# Patient Record
Sex: MALE | Race: WHITE | NOT HISPANIC OR LATINO | Employment: FULL TIME
[De-identification: names, ages, dates, MRNs, and addresses within clinical notes are randomized per-mention and may not be internally consistent; named-entity substitution may affect disease eponyms.]

---

## 2019-12-15 ENCOUNTER — HEALTH MAINTENANCE LETTER (OUTPATIENT)
Age: 29
End: 2019-12-15

## 2021-01-15 ENCOUNTER — HEALTH MAINTENANCE LETTER (OUTPATIENT)
Age: 31
End: 2021-01-15

## 2021-10-24 ENCOUNTER — HEALTH MAINTENANCE LETTER (OUTPATIENT)
Age: 31
End: 2021-10-24

## 2022-02-13 ENCOUNTER — HEALTH MAINTENANCE LETTER (OUTPATIENT)
Age: 32
End: 2022-02-13

## 2022-10-16 ENCOUNTER — HEALTH MAINTENANCE LETTER (OUTPATIENT)
Age: 32
End: 2022-10-16

## 2022-11-21 ENCOUNTER — OFFICE VISIT (OUTPATIENT)
Dept: URGENT CARE | Facility: URGENT CARE | Age: 32
End: 2022-11-21
Payer: COMMERCIAL

## 2022-11-21 VITALS
TEMPERATURE: 98.2 F | DIASTOLIC BLOOD PRESSURE: 80 MMHG | WEIGHT: 180 LBS | HEIGHT: 72 IN | RESPIRATION RATE: 15 BRPM | SYSTOLIC BLOOD PRESSURE: 118 MMHG | OXYGEN SATURATION: 98 % | BODY MASS INDEX: 24.38 KG/M2 | HEART RATE: 75 BPM

## 2022-11-21 DIAGNOSIS — J01.10 ACUTE NON-RECURRENT FRONTAL SINUSITIS: Primary | ICD-10-CM

## 2022-11-21 PROCEDURE — 99203 OFFICE O/P NEW LOW 30 MIN: CPT | Performed by: PHYSICIAN ASSISTANT

## 2022-11-21 NOTE — PROGRESS NOTES
Assessment & Plan     Acute non-recurrent frontal sinusitis  Use abx as prescribed, follow-up with primary if no improvement of symptoms. Patient understands and is amenable to plan.      CHRISTIN Deleon [facility] [place]    Sumit Turner is a 32 year old presenting for the following health issues:  Urgent Care and Cough (Dealing with cough x 2 months. Getting worse. Wife had Covid back then but he didn't test positive. Also tested last Friday. )  Cough x 2 months, has been intermittent, but the last week has gotten worse. He has a 2 year old, so they are frequently sick. He did take a COVID test 4 days ago was negative. Worse with speaking. Has had sinus pressure across forehead. Runny nose and nasal congestion. No ear pain. No SOB, no wheezing, no fevers.    HPI     Review of Systems   Constitutional, HEENT, cardiovascular, pulmonary, gi and gu systems are negative, except as otherwise noted.      Objective    /80   Pulse 75   Temp 98.2  F (36.8  C) (Temporal)   Resp 15   Ht 1.829 m (6')   Wt 81.6 kg (180 lb)   SpO2 98%   BMI 24.41 kg/m    Body mass index is 24.41 kg/m .  Physical Exam   GENERAL: healthy, alert and no distress  HENT: normal cephalic/atraumatic, ear canals and TM's normal, nose and mouth without ulcers or lesions, oropharynx clear, oral mucous membranes moist and sinuses: frontal tenderness on bilaterally  NECK: no adenopathy, no asymmetry, masses, or scars and thyroid normal to palpation  RESP: lungs clear to auscultation - no rales, rhonchi or wheezes  CV: regular rate and rhythm, normal S1 S2, no S3 or S4, no murmur, click or rub, no peripheral edema and peripheral pulses strong  MS: no gross musculoskeletal defects noted, no edema  PSYCH: mentation appears normal, affect normal/bright    No results found for this or any previous visit (from the past 24 hour(s)).

## 2023-01-24 ENCOUNTER — NURSE TRIAGE (OUTPATIENT)
Dept: NURSING | Facility: CLINIC | Age: 33
End: 2023-01-24
Payer: COMMERCIAL

## 2023-01-24 NOTE — TELEPHONE ENCOUNTER
"Nurse Triage SBAR    Situation:   -Cough, neck pain    Background:   -Patient calling, It is okay to leave a detailed message at this number.   -ongoing sinus infection (for the past few weeks - was seen in )  -the antibiotics helped but the cough came back     Assessment:     Neck:  -now has stiff neck (for the past 3 days)  -neck pain is now 6/10, mostly on right at base of tanja  -he is able to touch chin to chest  -no trauma or know injury  -He dose jujutsu and lists dumbbells - may be a lifting injury?     Cough  -Temp is 97.1  -Persistent cough since summer  -Productive  -Got worse after recent URI  -No nasal congestion  -No chest pain  -Daughter has RSV now    Recommendation:   Neck: see PCP within 3 days  Cough: see PCP  Within 2 weeks  -warm transferred to [facility] , if nothing is available go to /[facility]    SOUTH NEW RN on 1/24/2023 at 5:58 PM       Reason for Disposition    [1] MODERATE neck pain (e.g., interferes with normal activities) AND [2] present > 3 days    Cough lasts > 3 weeks    [1] Dry lingering cough AND [2] recent cold symptoms (e.g., runny nose, fever)    Additional Information    Negative: Shock suspected (e.g., cold/pale/clammy skin, too weak to stand, low BP, rapid pulse)    Negative: Sounds like a life-threatening emergency to the triager    Negative: Difficult to awaken or acting confused (e.g., disoriented, slurred speech)    Negative: [1] Similar pain previously AND [2] it was from \"heart attack\"    Negative: [1] Similar pain previously AND [2] it was from \"angina\" AND [3] not relieved by nitroglycerin    Negative: Followed a neck injury (e.g., MVA, sports, impact or collision)    Negative: Chest pain    Negative: Lymph node in the neck is swollen or painful to the touch    Negative: Sore throat is main symptom    Negative: [1] Stiff neck (can't put chin to chest) AND [2] headache    Negative: Difficulty breathing or unusual sweating (e.g., sweating without exertion)    " "Negative: [1] Stiff neck (can't put chin to chest) AND [2] fever    Negative: Weakness of an arm or hand    Negative: Problems with bowel or bladder control    Negative: Head is twisting to one side (or ask \"is it turning against your will?\")    Negative: Patient sounds very sick or weak to the triager    Negative: [1] SEVERE neck pain (e.g., excruciating, unable to do any normal activities) AND [2] not improved after 2 hours of pain medicine    Negative: [1] Fever > 100.0 F (37.8 C) AND [2] Intravenous Drug Use (IVDU)    Negative: [1] Fever > 100.0 F (37.8 C) AND [2] diabetes mellitus or weak immune system (e.g., HIV positive, cancer chemo, splenectomy, organ transplant, chronic steroids)    Negative: Numbness in an arm or hand (i.e., loss of sensation)    Negative: Tenderness or swelling of front of neck over windpipe    Negative: Rash in same area as pain (may be described as \"small blisters\")    Negative: High-risk adult (e.g., history of cancer, HIV, or IV drug use)    Negative: Neck pain present > 2 weeks    Negative: Pain shoots (radiates) into arm or hand    Negative: Neck pain is a chronic symptom (recurrent or ongoing AND present > 4 weeks)    Negative: [1] Age > 50 AND [2] no history of prior similar neck pain    Negative: SEVERE difficulty breathing (e.g., struggling for each breath, speaks in single words)    Negative: Sounds like a life-threatening emergency to the triager    Negative: [1] Difficulty breathing AND [2] not from stuffy nose (e.g., not relieved by cleaning out the nose)    Negative: [1] SEVERE headache AND [2] fever    Negative: [1] Taking antibiotic > 24 hours AND [2] fever > 103 F (39.4 C)    Negative: [1] Redness or swelling on the cheek, forehead or around the eye AND [2] fever    Negative: Patient sounds very sick or weak to the triager    Negative: [1] Redness or swelling on the cheek, forehead or around the eye AND [2] new since starting antibiotics    Negative: [1] SEVERE sinus " pain AND [2] not improved 2 hours after pain medicine    Negative: [1] Taking antibiotic > 48 hours (2 days) AND [2] fever persists    Negative: [1] Taking antibiotic > 72 hours (3 days) AND [2] sinus pain not improved    Negative: [1] Taking antibiotic > 7 days AND [2] nasal discharge not improved    Negative: SEVERE difficulty breathing (e.g., struggling for each breath, speaks in single words)    Negative: Sounds like a life-threatening emergency to the triager    Negative: SEVERE difficulty breathing (e.g., struggling for each breath, speaks in single words)    Negative: Sounds like a life-threatening emergency to the triager    Negative: Chest pain is main symptom    Negative: [1] Dry cough (non-productive;  no sputum or minimal clear sputum) AND [2] < 3 weeks duration    Negative: [1] Wet cough (productive; white-yellow, yellow, green, or abhishek colored sputum) AND [2] < 3 weeks duration    Negative: [1] Previous asthma attacks AND [2] this feels like asthma attack    Negative: [1] Lips or face are bluish now AND [2] persists when not coughing    Negative: [1] MODERATE difficulty breathing (e.g., speaks in phrases, SOB even at rest, pulse 100-120) AND [2] still present when not coughing    Negative: Chest pain  (Exception: MILD central chest pain, present only when coughing)    Negative: [1] MILD difficulty breathing (e.g., minimal/no SOB at rest, SOB with walking, pulse <100) AND [2] still present when not coughing (Exception: no change from usual, chronic shortness of breath)    Negative: [1] Increasing difficulty breathing AND [2] always has some difficulty breathing    Negative: Patient sounds very sick or weak to the triager    Negative: [1] Fever > 100.0 F (37.8 C) AND [2] diabetes mellitus or weak immune system (e.g., HIV positive, cancer chemo, splenectomy, organ transplant, chronic steroids)    Negative: [1] Fever > 100.0 F (37.8 C) AND [2] bedridden (e.g., nursing home patient, CVA, chronic illness,  recovering from surgery)    Negative: [1] Fever > 101 F (38.3 C) AND [2] age > 60 years    Negative: Fever > 103 F (39.4 C)    Negative: [1] Coughed up blood AND [2] > 1 tablespoon (15 ml)  (Exception: Blood-tinged sputum.)    Negative: SEVERE coughing spells (e.g., whooping sound after coughing, vomiting after coughing)    Negative: Fever present > 3 days (72 hours)    Negative: Sinus pain or pressure (around cheekbone or eye)    Negative: [1] Blood-tinged sputum has been coughed up AND [2] more than once    Negative: [1] Continuous (nonstop) coughing interferes with work or school AND [2] no improvement using cough treatment per Care Advice    Negative: Coughing up abhishek-colored sputum    Negative: Change in color of sputum (e.g., from white to yellow-green sputum)    Negative: Increase in amount of sputum    Negative: Taking an ACE Inhibitor medication  (e.g., benazepril/LOTENSIN, captopril/CAPOTEN, enalapril/VASOTEC, lisinopril/ZESTRIL)    Negative: [1] Chest or rib pain AND [2] only occurs while coughing    Negative: [1] Nasal discharge AND [2] present > 10 days    Negative: History of asthma or has mild wheezing    Negative: Exposure to TB (Tuberculosis)    Negative: [1] History of gastric reflux AND [2] intermittent symptoms of sour taste in mouth AND [3] dry cough    Protocols used: NECK PAIN OR [id number], COUGH - ACUTE NON-PRODUCTIVE-A-AH, COUGH - CHRONIC-A-AH, SINUS INFECTION ON ANTIBIOTIC FOLLOW-UP CALL-A-

## 2023-01-25 ENCOUNTER — OFFICE VISIT (OUTPATIENT)
Dept: FAMILY MEDICINE | Facility: CLINIC | Age: 33
End: 2023-01-25
Payer: COMMERCIAL

## 2023-01-25 VITALS
SYSTOLIC BLOOD PRESSURE: 144 MMHG | BODY MASS INDEX: 24.72 KG/M2 | WEIGHT: 182.5 LBS | HEART RATE: 62 BPM | HEIGHT: 72 IN | DIASTOLIC BLOOD PRESSURE: 85 MMHG | OXYGEN SATURATION: 99 % | TEMPERATURE: 98 F | RESPIRATION RATE: 16 BRPM

## 2023-01-25 DIAGNOSIS — M54.2 NECK PAIN: Primary | ICD-10-CM

## 2023-01-25 DIAGNOSIS — M62.838 NECK MUSCLE SPASM: ICD-10-CM

## 2023-01-25 LAB
AST SERPL W P-5'-P-CCNC: 46 U/L (ref 10–50)
CREAT SERPL-MCNC: 1 MG/DL (ref 0.67–1.17)
CRP SERPL-MCNC: <3 MG/L
ERYTHROCYTE [SEDIMENTATION RATE] IN BLOOD BY WESTERGREN METHOD: 8 MM/HR (ref 0–15)
GFR SERPL CREATININE-BSD FRML MDRD: >90 ML/MIN/1.73M2

## 2023-01-25 PROCEDURE — 85652 RBC SED RATE AUTOMATED: CPT | Performed by: INTERNAL MEDICINE

## 2023-01-25 PROCEDURE — 36415 COLL VENOUS BLD VENIPUNCTURE: CPT | Performed by: INTERNAL MEDICINE

## 2023-01-25 PROCEDURE — 84450 TRANSFERASE (AST) (SGOT): CPT | Performed by: INTERNAL MEDICINE

## 2023-01-25 PROCEDURE — 86140 C-REACTIVE PROTEIN: CPT | Performed by: INTERNAL MEDICINE

## 2023-01-25 PROCEDURE — 82565 ASSAY OF CREATININE: CPT | Performed by: INTERNAL MEDICINE

## 2023-01-25 PROCEDURE — 99214 OFFICE O/P EST MOD 30 MIN: CPT | Performed by: INTERNAL MEDICINE

## 2023-01-25 RX ORDER — MELOXICAM 15 MG/1
15 TABLET ORAL DAILY PRN
Qty: 14 TABLET | Refills: 0 | Status: SHIPPED | OUTPATIENT
Start: 2023-01-25 | End: 2024-04-15

## 2023-01-25 RX ORDER — METHOCARBAMOL 500 MG/1
500 TABLET, FILM COATED ORAL 4 TIMES DAILY PRN
Qty: 30 TABLET | Refills: 0 | Status: SHIPPED | OUTPATIENT
Start: 2023-01-25 | End: 2024-04-15

## 2023-01-25 ASSESSMENT — PAIN SCALES - GENERAL: PAINLEVEL: MODERATE PAIN (4)

## 2023-01-25 NOTE — PROGRESS NOTES
Assessment & Plan   Problem List Items Addressed This Visit    None  Visit Diagnoses     Neck pain    -  Primary    Relevant Medications    meloxicam (MOBIC) 15 MG tablet    methocarbamol (ROBAXIN) 500 MG tablet    Other Relevant Orders    Creatinine    CRP, inflammation    ESR: Erythrocyte sedimentation rate    AST    Physical Therapy Referral    CBC with platelets    Magnesium    Neck muscle spasm        Relevant Medications    meloxicam (MOBIC) 15 MG tablet    methocarbamol (ROBAXIN) 500 MG tablet         Anti-inflammatories and a muscle relaxer advised on use ,do not use  muscle relaxant when driving or using alcohol or fine machinery.  Keep well-hydrated rested ,can apply some warm compresses, refer to physical therapy if symptoms progress or worsen to notify us in next 72 hours.  Reassured this is not meningitis at this time is a focal area in the neck right posterior muscle spasm.  Denies any associated sinus symptoms headaches fevers or other systemic complaints, no neurologic deficits which is reassuring.  Will check basic labs including inflammatory markers and kidney liver test.  We will hold on imaging of his neck unless symptoms progress worsen as mentioned no red flags from physical exam or history.           Return in about 3 days (around 1/28/2023), or if symptoms worsen or fail to improve, for As needed and if symptoms worsen.    Quentin Jhaveri MD  [facility] Turner is a 32 year ol, presenting for the following health issues:  Chronic Cough (Since last November - tested negative for COVID (this past weekend) ) and Neck Pain (Back of neck )      History of Present Illness       Headaches:   Since the patient's last clinic visit, headaches are: worsened  The patient is getting headaches:  Constant over last few days  He is not able to do normal daily activities when he has a migraine.  The patient is taking the following rescue/relief medications:  Ibuprofen  "(Advil, Motrin) and Tylenol   Patient states \"I get only a small amount of relief\" from the rescue/relief medications.   The patient is taking the following medications to prevent migraines:  No medications to prevent migraines  In the past 4 weeks, the patient has gone to an Urgent Care or Emergency Room 0 times times due to headaches.    He eats 2-3 servings of fruits and vegetables daily.He consumes 0 sweetened beverage(s) daily.He exercises with enough effort to increase his heart rate 30 to 60 minutes per day.  He exercises with enough effort to increase his heart rate 4 days per week.   He is taking medications regularly.     Patient presents for evaluation of right posterior neck pain that radiates up to the right occipital area.  He he had sinus infection for last couple weeks was treated for that.  Denies any frontal headaches    Denies any shooting pain down to the right arm ,denies any focal weakness.  No vision changes, no slurred speech or facial weakness.  No injury in particular but he Last time was training with [employer] last Wednesday.  Denies any neck injury per se.  No other systemic complaints.      Review of Systems   Constitutional, HEENT, cardiovascular, pulmonary, gi and gu systems are negative, except as otherwise noted.      Objective    BP (!) 144/85 (BP Location: Right arm, Patient Position: Sitting, Cuff Size: Adult Regular)   Pulse 62   Temp 98  F (36.7  C) (Temporal)   Resp 16   Ht 1.829 m (6')   Wt 82.8 kg (182 lb 8 oz)   SpO2 99%   BMI 24.75 kg/m    Body mass index is 24.75 kg/m .  Physical Exam   General appearance not in acute distress, alert oriented x3  Head and neck intact cranial nerves II to XII, no neck masses, no tenderness over cervical vertebral, has some tenderness over the right lower paracervical vertebral muscles; slightly spastic  Full range of motion of the necK   oral cavity, minimal pharyngeal redness slight tonsillar swelling.  No exudates  No cervical " lymphadenopathy  Intact neurologic exam  Eyes EOMI PERRLA,      Office Visit on 08/28/2014   Component Date Value Ref Range Status     TSH 08/28/2014 1.83  0.40 - 4.00 mU/L Final    Comment: Effective 7/30/2014, the reference range for this assay has changed to reflect   new instrumentation/methodology.       T4 Free 08/28/2014 1.20  0.76 - 1.46 ng/dL Final    Comment: Effective 7/30/2014, the reference range for this assay has changed to reflect   new instrumentation/methodology.

## 2023-03-16 ENCOUNTER — E-VISIT (OUTPATIENT)
Dept: URGENT CARE | Facility: CLINIC | Age: 33
End: 2023-03-16
Payer: COMMERCIAL

## 2023-03-16 DIAGNOSIS — J01.90 ACUTE SINUSITIS, RECURRENCE NOT SPECIFIED, UNSPECIFIED LOCATION: Primary | ICD-10-CM

## 2023-03-16 PROCEDURE — 99421 OL DIG E/M SVC 5-10 MIN: CPT | Performed by: PREVENTIVE MEDICINE

## 2023-03-16 RX ORDER — DOXYCYCLINE 100 MG/1
100 TABLET ORAL 2 TIMES DAILY
Qty: 14 TABLET | Refills: 0 | Status: SHIPPED | OUTPATIENT
Start: 2023-03-16 | End: 2023-03-23

## 2023-03-16 NOTE — PATIENT INSTRUCTIONS
Dear Franco Raymond    I have ordered a different antibiotic for you.  I also advise using over the counter fluticasone 2 sprays per nostril daily.  In addition, netipot saline rinse is recommended two times per day.  If these measures don't improve your symptoms in 2-3 weeks, I advise follow up in the clinic in person for assessment as this has been going on for a while.    Thanks for choosing us as your health care partner,    Joseph Ruiz MD

## 2023-03-26 ENCOUNTER — HEALTH MAINTENANCE LETTER (OUTPATIENT)
Age: 33
End: 2023-03-26

## 2023-07-12 ENCOUNTER — E-VISIT (OUTPATIENT)
Dept: URGENT CARE | Facility: CLINIC | Age: 33
End: 2023-07-12
Payer: COMMERCIAL

## 2023-07-12 ENCOUNTER — TELEPHONE (OUTPATIENT)
Dept: NURSING | Facility: CLINIC | Age: 33
End: 2023-07-12

## 2023-07-12 DIAGNOSIS — R05.2 SUBACUTE COUGH: Primary | ICD-10-CM

## 2023-07-12 PROCEDURE — 99207 PR NO CHARGE LOS: CPT | Performed by: PHYSICIAN ASSISTANT

## 2023-07-12 NOTE — TELEPHONE ENCOUNTER
"Telephone Call:     Pt was advised via an evisit to be seen in person by a provider for his \"nagging cough\" he has had for a while. The provider stated that patient should be seen in person per his report.     Pt is asking what his options are for appts to be seen in person. Pt was given the appointment information. There were no appts in clinic near his location, so he was advised that he could be [place] in person [place] [facility]     Claudine Freeman RN  [facility] Nurse Advisor 2:04 PM 7/12/2023    "

## 2023-07-12 NOTE — PATIENT INSTRUCTIONS
Dear Franco Raymond,    We are sorry you are not feeling well. Based on the responses you provided, it is recommended that you be seen in-person in urgent care so we can better evaluate your symptoms. Please click here to find the nearest urgent care location to you.   You will not be charged for this Visit. Thank you for trusting us with your care.    Juan A Zepeda PA-C

## 2023-12-23 ENCOUNTER — E-VISIT (OUTPATIENT)
Dept: URGENT CARE | Facility: CLINIC | Age: 33
End: 2023-12-23
Payer: COMMERCIAL

## 2023-12-23 DIAGNOSIS — H57.89 REDNESS OF EYE, LEFT: Primary | ICD-10-CM

## 2023-12-23 PROCEDURE — 99207 PR NON-BILLABLE SERV PER CHARTING: CPT | Performed by: FAMILY MEDICINE

## 2023-12-23 NOTE — PATIENT INSTRUCTIONS
Dear Franco Raymond,    We are sorry you are not feeling well. Based on the responses you provided, it is recommended that you be seen in-person in urgent care so we can better evaluate your symptoms. Please click here to find the nearest urgent care location to you.   You will not be charged for this Visit. Thank you for trusting us with your care.    Ronnie Moore MD    The photo is of a child. If you want evisit for the child than it has to be under their chart/name.  Dr. Moore

## 2024-01-26 ENCOUNTER — OFFICE VISIT (OUTPATIENT)
Dept: URGENT CARE | Facility: URGENT CARE | Age: 34
End: 2024-01-26
Payer: COMMERCIAL

## 2024-01-26 ENCOUNTER — ANCILLARY PROCEDURE (OUTPATIENT)
Dept: GENERAL RADIOLOGY | Facility: CLINIC | Age: 34
End: 2024-01-26
Attending: PHYSICIAN ASSISTANT
Payer: COMMERCIAL

## 2024-01-26 VITALS
WEIGHT: 187.5 LBS | HEART RATE: 70 BPM | HEIGHT: 72 IN | DIASTOLIC BLOOD PRESSURE: 84 MMHG | SYSTOLIC BLOOD PRESSURE: 124 MMHG | OXYGEN SATURATION: 98 % | BODY MASS INDEX: 25.4 KG/M2 | TEMPERATURE: 98.1 F

## 2024-01-26 DIAGNOSIS — M54.41 ACUTE RIGHT-SIDED LOW BACK PAIN WITH RIGHT-SIDED SCIATICA: Primary | ICD-10-CM

## 2024-01-26 PROCEDURE — 99213 OFFICE O/P EST LOW 20 MIN: CPT | Mod: 25 | Performed by: PHYSICIAN ASSISTANT

## 2024-01-26 PROCEDURE — 96372 THER/PROPH/DIAG INJ SC/IM: CPT | Performed by: PHYSICIAN ASSISTANT

## 2024-01-26 PROCEDURE — 72100 X-RAY EXAM L-S SPINE 2/3 VWS: CPT | Mod: TC | Performed by: INTERNAL MEDICINE

## 2024-01-26 RX ORDER — KETOROLAC TROMETHAMINE 30 MG/ML
30 INJECTION, SOLUTION INTRAMUSCULAR; INTRAVENOUS ONCE
Status: COMPLETED | OUTPATIENT
Start: 2024-01-26 | End: 2024-01-26

## 2024-01-26 RX ORDER — NAPROXEN SODIUM 550 MG/1
550 TABLET ORAL 2 TIMES DAILY WITH MEALS
Qty: 14 TABLET | Refills: 0 | Status: SHIPPED | OUTPATIENT
Start: 2024-01-27 | End: 2024-02-03

## 2024-01-26 RX ORDER — CYCLOBENZAPRINE HCL 10 MG
10 TABLET ORAL 3 TIMES DAILY PRN
Qty: 21 TABLET | Refills: 0 | Status: SHIPPED | OUTPATIENT
Start: 2024-01-26 | End: 2024-02-02

## 2024-01-26 RX ADMIN — KETOROLAC TROMETHAMINE 30 MG: 30 INJECTION, SOLUTION INTRAMUSCULAR; INTRAVENOUS at 14:24

## 2024-01-26 ASSESSMENT — ENCOUNTER SYMPTOMS
PARESTHESIAS: 1
WEAKNESS: 0
NUMBNESS: 1
BACK PAIN: 1

## 2024-01-26 ASSESSMENT — PAIN SCALES - GENERAL: PAINLEVEL: SEVERE PAIN (7)

## 2024-01-26 NOTE — PATIENT INSTRUCTIONS
Perform hot epsom salt soaks, light stretching, lidocaine patches as needed.   Follow-up with Spine for further evaluation

## 2024-01-26 NOTE — PROGRESS NOTES
Assessment & Plan        1. Acute right-sided low back pain with right-sided sciatica    - ketorolac (TORADOL) injection 30 mg was administered in the clinic.  Patient tolerated the medication well.  Patient reported an improvement in the back pain after 20 minutes.  - XR Lumbar Spine 2/3 Views does not show DDD, lumbar stenosis per radiology report  -I have referred patient to Spine for further evaluation and treatment.  - Spine  Referral; Future  - cyclobenzaprine (FLEXERIL) 10 MG tablet; Take 1 tablet (10 mg) by mouth 3 times daily as needed for muscle spasms  Dispense: 21 tablet; Refill: 0  - naproxen sodium (ANAPROX) 550 MG tablet; Take 1 tablet (550 mg) by mouth 2 times daily (with meals) for 7 days  Dispense: 14 tablet; Refill: 0    Results for orders placed or performed in visit on 01/26/24   XR Lumbar Spine 2/3 Views     Status: None    Narrative    XR LUMBAR SPINE 2/3 VIEWS  1/26/2024 2:59 PM     HISTORY: Acute right-sided low back pain with right-sided sciatica    COMPARISON: None.       Impression    IMPRESSION: Alignment of the lumbar spine is within normal limits. No  loss of vertebral body height. No significant degenerative endplate  changes or loss of intervertebral disc space. Minimal lower lumbar  facet arthropathy. No soft tissue abnormality.    JEREMY HULL MD         SYSTEM ID:  [id number]       Patient Instructions   Perform hot epsom salt soaks, light stretching, lidocaine patches as needed.   Follow-up with Spine for further evaluation      Return for Follow up with Spine.    At the end of the encounter, I discussed results, diagnosis, medications. Discussed red flags for immediate return to clinic/ER, as well as indications for follow up if no improvement. Patient understood and agreed to plan. Patient was stable for discharge.    Sumit Turner is a 33 year old male who presents to clinic today for the following health issues:  Chief Complaint   Patient presents with     Urgent Care    Back Pain     Back pain x7 weeks, this morning pain worsened, lower righ back pain that sometimes spreads down hip to side of right leg, hurts to sit, hard to get in and out of cars   Started after jumping on trampoline     HPI  Patient reports back pain which started 7 weeks ago. Patient was jumping on the trampoline for 20 minutes. He was at a trampoline park for his daughter`s cousin birthday. He reports right sided back pain which radiates to the right groin area, and anterior thigh. He notes sitting worsens the pain, walking is fine. He has pain when he has to get out of the car. He had experienced tingling and numbness on the lower back.  He rates the pain at 5/10 and worse at 8/10. He describes the pain as sharp. He has tried, stretching, foam rolling, tylenol, ibuprofen. He has been to the chiropractor twice.  Denies weakness.       Review of Systems   Musculoskeletal:  Positive for back pain. Negative for gait problem.   Neurological:  Positive for numbness and paresthesias. Negative for weakness.   All other systems reviewed and are negative.      Problem List:  Calculus of kidney  Thyrotoxicosis      Past Medical History:   Diagnosis Date    Calculus of kidney 10/06    Thyrotoxicosis without mention of goiter or other cause, without mention of thyrotoxic crisis or storm        Social History     Tobacco Use    Smoking status: Never     Passive exposure: Yes    Smokeless tobacco: Never    Tobacco comments:     Socially  1-2 per day   Substance Use Topics    Alcohol use: No           Objective    /84   Pulse 70   Temp 98.1  F (36.7  C) (Temporal)   Ht 1.829 m (6')   Wt 85.1 kg (187 lb 8 oz)   SpO2 98%   BMI 25.43 kg/m    Physical Exam  Vitals and nursing note reviewed.   Constitutional:       Appearance: Normal appearance.   Cardiovascular:      Rate and Rhythm: Normal rate and regular rhythm.   Pulmonary:      Effort: Pulmonary effort is normal.      Breath sounds: Normal breath  sounds.   Musculoskeletal:      Cervical back: Normal range of motion and neck supple.      Lumbar back: Positive right straight leg raise test and positive left straight leg raise test.   Skin:     General: Skin is warm and dry.      Findings: No rash.   Neurological:      General: No focal deficit present.      Mental Status: He is alert and oriented to person, place, and time.      Cranial Nerves: Cranial nerves 2-12 are intact.      Sensory: Sensation is intact.      Motor: Motor function is intact.      Gait: Gait is intact.      Deep Tendon Reflexes:      Reflex Scores:       Patellar reflexes are 2+ on the right side and 2+ on the left side.       Achilles reflexes are 2+ on the right side and 2+ on the left side.  Psychiatric:         Mood and Affect: Mood normal.         Behavior: Behavior normal.              Alanna Howard PA-C

## 2024-01-29 ENCOUNTER — OFFICE VISIT (OUTPATIENT)
Dept: PHYSICAL MEDICINE AND REHAB | Facility: CLINIC | Age: 34
End: 2024-01-29
Attending: PHYSICIAN ASSISTANT
Payer: COMMERCIAL

## 2024-01-29 DIAGNOSIS — M51.369 DDD (DEGENERATIVE DISC DISEASE), LUMBAR: ICD-10-CM

## 2024-01-29 DIAGNOSIS — M54.41 ACUTE RIGHT-SIDED LOW BACK PAIN WITH RIGHT-SIDED SCIATICA: Primary | ICD-10-CM

## 2024-01-29 DIAGNOSIS — M54.16 RIGHT LUMBAR RADICULOPATHY: ICD-10-CM

## 2024-01-29 DIAGNOSIS — M47.816 LUMBAR FACET ARTHROPATHY: ICD-10-CM

## 2024-01-29 PROCEDURE — 99204 OFFICE O/P NEW MOD 45 MIN: CPT | Performed by: NURSE PRACTITIONER

## 2024-01-29 RX ORDER — PREDNISONE 10 MG/1
TABLET ORAL
Qty: 18 TABLET | Refills: 0 | Status: SHIPPED | OUTPATIENT
Start: 2024-01-29 | End: 2024-05-08

## 2024-01-29 NOTE — LETTER
1/29/2024         RE: Franco Raymond  [address]  [facility] [address]        Dear Colleague,    Thank you for referring your patient, Franco Raymond, to the [facility]. Please see a copy of my visit note below.    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    No myelopathic or red flag symptoms.          No data to display                     Diagnoses and all orders for this visit:  Acute right-sided low back pain with right-sided sciatica  -     Spine  Referral  -     MR Lumbar Spine w/o Contrast; Future  -     predniSONE (DELTASONE) 10 MG tablet; Prednisone 10 mg tablet, 3 tablets p.o. daily for 3 days, then 2 tablets p.o. daily for 3 days, then 1 tablet p.o. daily for 3 days then stop.  -     Physical Therapy Referral; Future  Lumbar facet arthropathy  Right lumbar radiculopathy  -     MR Lumbar Spine w/o Contrast; Future  -     predniSONE (DELTASONE) 10 MG tablet; Prednisone 10 mg tablet, 3 tablets p.o. daily for 3 days, then 2 tablets p.o. daily for 3 days, then 1 tablet p.o. daily for 3 days then stop.  -     Physical Therapy Referral; Future  DDD (degenerative disc disease), lumbar  -     MR Lumbar Spine w/o Contrast; Future  -     Physical Therapy Referral; Future    PLAN:  Reviewed spine anatomy and disease process. Discussed diagnosis and treatment options with the patient today. A shared decision making model was used.  The patient's values and choices were respected. The following represents what was discussed and decided upon by the provider and the patient.       -DIAGNOSTIC TESTS:  Images were personally reviewed and interpreted and explained to patient today using spine model.   -- Ordered urgent lumbar spine MRI to further evaluate lumbar radiculopathy for possible intervention.  -- Lumbar spine x-ray 1/26/2024 with normal alignment and disc height.  Minimal facet arthropathy lower lumbar spine.    -PHYSICAL THERAPY: Referral to physical therapy to establish home exercises for lumbar core strengthening and nerve glides.  Discussed the importance of core strengthening, ROM, stretching exercises with the patient and how each of these entities is important in decreasing pain.  Explained to the patient that the purpose of physical therapy is to teach the patient a home exercise program.  These exercises need to be performed every day in order to decrease pain and prevent future occurrences of pain.        -MEDICATIONS: Prescribed Medrol Dosepak today.  Advised patient to stop naproxen, no meloxicam and avoid OTC NSAIDs will take this medication and take it with full glass water and food.  Can continue with muscle relaxant as needed as well.  Discussed multiple medication options today with patient. Discussed risks, side effects, and proper use of medications. Patient verbalized understanding.    -INTERVENTIONS: Discussed with patient if no benefit with Medrol Dosepak and pain continues to be severe could consider RIGHT lumbar HALIMA pending imaging review.  Patient would be open to this as well as currently his pain is intolerable.  Discussed risks and benefits of injections with patient today.    -PATIENT EDUCATION:  Total time of 54 minutes, on the day of service, spent with the patient, reviewing the chart, placing orders, and documenting.     -FOLLOW-UP:   Follow-up [employer] message for MRI results and possible injection options.    Advised patient to call the Spine Center if symptoms worsen or you have problems controlling bladder and bowel function.    ______________________________________________________________________    SUBJECTIVE:  HPI:  Farnco Raymond  Is a 33 year old male who presents today for new patient evaluation of low back pain that has been intermittent however typically flareups only last for couple of days historically.  Patient has had now severe right low back and right radiating lateral hip and anterior lateral thigh pain for the last 8 weeks after jumping on his trampoline.  Pain initially was severe and has been since then currently at a 9/10, a 1 at its best but overall it is aggravated most of the time.  He does report that driving, transition from sit to  the first couple of steps is really aggravating and he is noticing some tingling in his leg as well.  Currently denies left leg symptoms.  Denies any lower extremity weakness.  Denies any recent trips or falls or balance changes.  Denies bowel or bladder loss control.        -Treatment to Date: No prior spinal surgery or spinal injections.  No prior physical therapy  Chiropractic care in the last 8 weeks x 2 with short-term relief    -Medications:  Cyclobenzaprine  Naproxen 550 mg  Toradol injection PCP 1/26/2024    Past medications:  Meloxicam  Methocarbamol    Current Outpatient Medications   Medication     cyclobenzaprine (FLEXERIL) 10 MG tablet     naproxen sodium (ANAPROX) 550 MG tablet     predniSONE (DELTASONE) 10 MG tablet     Acetaminophen-guaiFENesin 325-200 MG TABS     meloxicam (MOBIC) 15 MG tablet     methocarbamol (ROBAXIN) 500 MG tablet     NO ACTIVE MEDICATIONS     No current facility-administered medications for this visit.       Allergies   Allergen Reactions     Compazine [Prochlorperazine]      Dystonic jaw movements         Past Medical History:   Diagnosis Date     Calculus of kidney 10/06     Thyrotoxicosis without mention of goiter or other cause, without mention of thyrotoxic crisis or storm         Patient Active Problem List   Diagnosis     Calculus  of kidney     Thyrotoxicosis       Past Surgical History:   Procedure Laterality Date     SURGICAL HISTORY OF -   10/06    cystoscopy/ureteral stone extraction - dr herndon       Family History   Problem Relation Age of Onset     Family History Negative Unknown        Reviewed past medical, surgical, and family history with patient found on new patient intake packet located in EMR Media tab.     SOCIAL HX: Patient is  and works as a .  Typically is active 3 to 5 days a week with [employer].  Patient denies smoking/tobacco use.  Does report occasional beer/cocktail use.  Denies history being a heavy drinker.  Does report occasional THC Gummies.    ROS: Patient reports positive joint pain, muscle pain, muscle fatigue, anxiety/depression, diarrhea.  Specifically negative for bowel/bladder dysfunction, balance changes, headache, dizziness, foot drop, fevers, chills, appetite changes, nausea/vomiting, unexplained weight loss. Otherwise 13 systems reviewed are negative. Please see the patient's intake questionnaire from today for details.    OBJECTIVE:  There were no vitals taken for this visit.    PHYSICAL EXAMINATION:    --CONSTITUTIONAL:  Vital signs as above.  No acute distress.  The patient is well nourished and well groomed.  --PSYCHIATRIC:  Appropriate mood and affect. The patient is awake, alert, oriented to person, place, time and answering questions appropriately with clear speech.    --SKIN:  Skin over the face, bilateral lower extremities, and posterior torso is clean, dry, intact without rashes.    --RESPIRATORY: Normal rhythm and effort. No abnormal accessory muscle breathing patterns noted.   --STANDING EXAMINATION:  Normal lumbar lordosis noted, no lateral shift.  --MUSCULOSKELETAL: Range of motion is limited in all range of motion, greatest in forward flexion due to pain.  No tenderness to palpation lumbar spine.  Straight leg raising is positive to back pain bilaterally, positive to leg pain  on the right.  --GROSS MOTOR: Gait is non-antalgic.   --LOWER EXTREMITY MOTOR TESTING:  Plantar flexion left 5/5, right 5/5   Dorsiflexion left 5/5, right 5/5   Great toe MTP extension left 5/5, right 5/5  Knee flexion left 5/5, right 5/5  Knee extension left 5/5, right 5/5   Hip flexion left 5/5, right 5/5  Hip abduction left 5/5, right 5/5  Hip adduction left 5/5, right 5/5   --HIPS: Full range of motion bilaterally. Negative FABERs on the involved lower extremity.   --NEUROLOGICAL:  2/4 patellar, achilles reflexes on the left and 1/4 on the right.  Sensation to light touch is intact in the bilateral L4, L5, and S1 dermatomes. Babinski is negative.   --VASCULAR:  Bilateral lower extremities are warm.  There is no pitting edema of the bilateral lower extremities.    RESULTS: Prior medical records from [facility] and [facility] Everywhere were reviewed today.    Imaging: Spine imaging was personally reviewed and interpreted today. The images were shown to the patient and the findings were explained using a spine model.      XR Lumbar Spine 2/3 Views    Result Date: 1/26/2024  XR LUMBAR SPINE 2/3 VIEWS  1/26/2024 2:59 PM HISTORY: Acute right-sided low back pain with right-sided sciatica COMPARISON: None.     IMPRESSION: Alignment of the lumbar spine is within normal limits. No loss of vertebral body height. No significant degenerative endplate changes or loss of intervertebral disc space. Minimal lower lumbar facet arthropathy. No soft tissue abnormality. JEREMY HULL MD   SYSTEM ID:  [id number]               Again, thank you for allowing me to participate in the care of your patient.        Sincerely,        Viktoriya Cohen, TIERRA

## 2024-01-29 NOTE — PROGRESS NOTES
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    No myelopathic or red flag symptoms.          No data to display                     Diagnoses and all orders for this visit:  Acute right-sided low back pain with right-sided sciatica  -     Spine  Referral  -     MR Lumbar Spine w/o Contrast; Future  -     predniSONE (DELTASONE) 10 MG tablet; Prednisone 10 mg tablet, 3 tablets p.o. daily for 3 days, then 2 tablets p.o. daily for 3 days, then 1 tablet p.o. daily for 3 days then stop.  -     Physical Therapy Referral; Future  Lumbar facet arthropathy  Right lumbar radiculopathy  -     MR Lumbar Spine w/o Contrast; Future  -     predniSONE (DELTASONE) 10 MG tablet; Prednisone 10 mg tablet, 3 tablets p.o. daily for 3 days, then 2 tablets p.o. daily for 3 days, then 1 tablet p.o. daily for 3 days then stop.  -     Physical Therapy Referral; Future  DDD (degenerative disc disease), lumbar  -     MR Lumbar Spine w/o Contrast; Future  -     Physical Therapy Referral; Future    PLAN:  Reviewed spine anatomy and disease process. Discussed diagnosis and treatment options with the patient today. A shared decision making model was used.  The patient's values and choices were respected. The following represents what was discussed and decided upon by the provider and the patient.      -DIAGNOSTIC TESTS:  Images were personally reviewed and interpreted and explained to patient today using spine model.   -- Ordered urgent lumbar spine MRI to further evaluate lumbar radiculopathy for possible intervention.  -- Lumbar spine x-ray  1/26/2024 with normal alignment and disc height.  Minimal facet arthropathy lower lumbar spine.    -PHYSICAL THERAPY: Referral to physical therapy to establish home exercises for lumbar core strengthening and nerve glides.  Discussed the importance of core strengthening, ROM, stretching exercises with the patient and how each of these entities is important in decreasing pain.  Explained to the patient that the purpose of physical therapy is to teach the patient a home exercise program.  These exercises need to be performed every day in order to decrease pain and prevent future occurrences of pain.        -MEDICATIONS: Prescribed Medrol Dosepak today.  Advised patient to stop naproxen, no meloxicam and avoid OTC NSAIDs will take this medication and take it with full glass water and food.  Can continue with muscle relaxant as needed as well.  Discussed multiple medication options today with patient. Discussed risks, side effects, and proper use of medications. Patient verbalized understanding.    -INTERVENTIONS: Discussed with patient if no benefit with Medrol Dosepak and pain continues to be severe could consider RIGHT lumbar HALIMA pending imaging review.  Patient would be open to this as well as currently his pain is intolerable.  Discussed risks and benefits of injections with patient today.    -PATIENT EDUCATION:  Total time of 54 minutes, on the day of service, spent with the patient, reviewing the chart, placing orders, and documenting.     -FOLLOW-UP:   Follow-up [employer] message for MRI results and possible injection options.    Advised patient to call the Spine Center if symptoms worsen or you have problems controlling bladder and bowel function.   ______________________________________________________________________    SUBJECTIVE:  HPI:  Franco ERNST Robinsonartem  Is a 33 year old male who presents today for new patient evaluation of low back pain that has been intermittent however typically flareups only last for couple  of days historically.  Patient has had now severe right low back and right radiating lateral hip and anterior lateral thigh pain for the last 8 weeks after jumping on his trampoline.  Pain initially was severe and has been since then currently at a 9/10, a 1 at its best but overall it is aggravated most of the time.  He does report that driving, transition from sit to  the first couple of steps is really aggravating and he is noticing some tingling in his leg as well.  Currently denies left leg symptoms.  Denies any lower extremity weakness.  Denies any recent trips or falls or balance changes.  Denies bowel or bladder loss control.        -Treatment to Date: No prior spinal surgery or spinal injections.  No prior physical therapy  Chiropractic care in the last 8 weeks x 2 with short-term relief    -Medications:  Cyclobenzaprine  Naproxen 550 mg  Toradol injection PCP 1/26/2024    Past medications:  Meloxicam  Methocarbamol    Current Outpatient Medications   Medication    cyclobenzaprine (FLEXERIL) 10 MG tablet    naproxen sodium (ANAPROX) 550 MG tablet    predniSONE (DELTASONE) 10 MG tablet    Acetaminophen-guaiFENesin 325-200 MG TABS    meloxicam (MOBIC) 15 MG tablet    methocarbamol (ROBAXIN) 500 MG tablet    NO ACTIVE MEDICATIONS     No current facility-administered medications for this visit.       Allergies   Allergen Reactions    Compazine [Prochlorperazine]      Dystonic jaw movements         Past Medical History:   Diagnosis Date    Calculus of kidney 10/06    Thyrotoxicosis without mention of goiter or other cause, without mention of thyrotoxic crisis or storm         Patient Active Problem List   Diagnosis    Calculus of kidney    Thyrotoxicosis       Past Surgical History:   Procedure Laterality Date    SURGICAL HISTORY OF -   10/06    cystoscopy/ureteral stone extraction - dr herndon       Family History   Problem Relation Age of Onset    Family History Negative Unknown        Reviewed past  medical, surgical, and family history with patient found on new patient intake packet located in EMR Media tab.     SOCIAL HX: Patient is  and works as a .  Typically is active 3 to 5 days a week with [employer].  Patient denies smoking/tobacco use.  Does report occasional beer/cocktail use.  Denies history being a heavy drinker.  Does report occasional THC Gummies.    ROS: Patient reports positive joint pain, muscle pain, muscle fatigue, anxiety/depression, diarrhea.  Specifically negative for bowel/bladder dysfunction, balance changes, headache, dizziness, foot drop, fevers, chills, appetite changes, nausea/vomiting, unexplained weight loss. Otherwise 13 systems reviewed are negative. Please see the patient's intake questionnaire from today for details.    OBJECTIVE:  There were no vitals taken for this visit.    PHYSICAL EXAMINATION:    --CONSTITUTIONAL:  Vital signs as above.  No acute distress.  The patient is well nourished and well groomed.  --PSYCHIATRIC:  Appropriate mood and affect. The patient is awake, alert, oriented to person, place, time and answering questions appropriately with clear speech.    --SKIN:  Skin over the face, bilateral lower extremities, and posterior torso is clean, dry, intact without rashes.    --RESPIRATORY: Normal rhythm and effort. No abnormal accessory muscle breathing patterns noted.   --STANDING EXAMINATION:  Normal lumbar lordosis noted, no lateral shift.  --MUSCULOSKELETAL: Range of motion is limited in all range of motion, greatest in forward flexion due to pain.  No tenderness to palpation lumbar spine.  Straight leg raising is positive to back pain bilaterally, positive to leg pain on the right.  --GROSS MOTOR: Gait is non-antalgic.   --LOWER EXTREMITY MOTOR TESTING:  Plantar flexion left 5/5, right 5/5   Dorsiflexion left 5/5, right 5/5   Great toe MTP extension left 5/5, right 5/5  Knee flexion left 5/5, right 5/5  Knee extension left 5/5, right 5/5   Hip  flexion left 5/5, right 5/5  Hip abduction left 5/5, right 5/5  Hip adduction left 5/5, right 5/5   --HIPS: Full range of motion bilaterally. Negative FABERs on the involved lower extremity.   --NEUROLOGICAL:  2/4 patellar, achilles reflexes on the left and 1/4 on the right.  Sensation to light touch is intact in the bilateral L4, L5, and S1 dermatomes. Babinski is negative.   --VASCULAR:  Bilateral lower extremities are warm.  There is no pitting edema of the bilateral lower extremities.    RESULTS: Prior medical records from [facility] and [facility] Everywhere were reviewed today.    Imaging: Spine imaging was personally reviewed and interpreted today. The images were shown to the patient and the findings were explained using a spine model.      XR Lumbar Spine 2/3 Views    Result Date: 1/26/2024  XR LUMBAR SPINE 2/3 VIEWS  1/26/2024 2:59 PM HISTORY: Acute right-sided low back pain with right-sided sciatica COMPARISON: None.     IMPRESSION: Alignment of the lumbar spine is within normal limits. No loss of vertebral body height. No significant degenerative endplate changes or loss of intervertebral disc space. Minimal lower lumbar facet arthropathy. No soft tissue abnormality. JEREMY HULL MD   SYSTEM ID:  [id number]

## 2024-01-29 NOTE — PATIENT INSTRUCTIONS
~Spine Center Scheduling #[phone].  ~Please call our [facility] Spine Nurse Navigation #[phone] with any questions or concerns about your treatment plan, if symptoms worsen and you would like to be seen urgently, or if you have problems controlling bladder and bowel function.  ~For any future flareups or new symptoms, recommend follow-up in clinic or contact the nurse navigator line.  ~Please note that any My Chart messages may take multiple days for a response due to the high volume of patients seen in clinic.  Anything sent Thursday night or after will be answered the following week when able, as Viktoriya Cohen CNP does not work in clinic on Fridays.   ~Viktoriya Cohen CNP is at the [facility] on the first and third Tuesdays of the month only, otherwise primarily at the [facility].        Imaging has been ordered. Radiology will call you to schedule. Please call below if you do not hear from them in the next couple of days.     [facility] Radiology Scheduling    Please call [phone] to schedule your image(s) (select option #1). There are 3 different locations, see below.     [facility]  [address] [facility] [place]  [address], [address]   [place] [address]       A Medrol Dose Pack (Prednisone Taper) was prescribed today for your acute pain. Please follow the dosing instruction on prescription bottle.    *Stop taking over-the-counter NSAIDs and no naproxen or meloxicam while taking this medication.  Please take with full glass water and food as well.    POSSIBLE STEROID SIDE EFFECTS   -If you experience these, it should only last for a short period-   Swelling   Increased appetite   Skin redness (flushness)   Skin rash   Mouth (oral) irritation   Blood sugar (glucose) levels   Sweats   Mood changes       ~You have been  referred for Physical Therapy to [facility]. They will call you to schedule an appointment.      Scheduling phone number is [phone] for [facility], or [place] location.  If you have not heard from the scheduling office within 2 business days, please call [phone] for ALL other locations.    Discussed the importance of core strengthening, ROM, stretching exercises and how each of these entities is important in decreasing pain and improving long term spine health.  The purpose of physical therapy is to teach you an individualized home exercise program.  These exercises need to be performed every day in order to decrease pain and prevent future occurrences of pain.

## 2024-02-02 NOTE — PROGRESS NOTES
PHYSICAL THERAPY EVALUATION  Type of Visit: Evaluation    See electronic medical record for Abuse and Falls Screening details.    Subjective   Date of onset: 12/11/23      Franco Raymond is a 33 year old male with a lumbar condition. Mechanism of injury: Jumping on trampoline. Where: (home, work, MVA, community, recreation/sport, unknown, other): community. Location of symptoms: right low back, right lateral hip, right anterior lateral thigh. Pain level on number scale: 1-9/10. Quality of pain: spasm. Associated symptoms: denies numbness, tingling. Pain frequency (constant/intermittent): constant. Symptoms are exacerbated by: driving, sit>stand, sitting, walking, standing, walking. Symptoms are relieved by: muscle relaxers. Progression of symptoms since onset (same/better/worse): better. Special tests (x-ray, MRI, CT scan, EMG, bone scan): x-ray, MRI. Previous treatment:  Prescribed Medrol Dosepak. Improvement with previous treatment: yes. General health as reported by patient is good. Pertinent medical history includes:  see Epic. Medical allergies includes: see Epic. Surgical history includes: see Epic. Current medications include: see Epic. Occupation: UX design. Patient is (working in normal job without restrictions/working in normal job with restrictions/working in an alternate job/not working due to present treatment problem): working in normal job. Primary job tasks: computer work. Barriers at home/work: None reported by patient. Red flags: None reported by patient.     Objective   Posture    Sitting (good/fair/poor): poor  Standing (good/fair/poor):  fair  Lordosis (red/acc/normal):  normal  Lateral shift (right/left/nil):  nil  Relevant lateral shift (yes/no):  no  Correction of posture (better/worse/no effect): better    Neurological    Myotomes L R   L1-2 (hip flexion) 5/5 5/5   L3 (knee extension) 5/5 5/5   L4 (ankle DF) 5/5 5/5   L5 (g. toe ext) 5/5 5/5   S1 (ankle PF or knee flex) 5/5 5/5     Sensory  Deficit, Reflexes: lumbar dermatomes WNL    Lumbar Movement Loss Jv Mod Min Nil Pain   Flexion   x  +right low back   Extension   x  +right low back   Side Gliding L   x  +right low back   Side Gliding R   x  +right low back     Assessment & Plan   CLINICAL IMPRESSIONS  Medical Diagnosis: Acute right-sided low back pain with right-sided sciatica    Treatment Diagnosis: Acute right-sided low back pain with right-sided sciatica    Impression/Assessment: Patient is a 33 year old male with lumbar complaints.  The following significant findings have been identified: Pain, Decreased ROM/flexibility, Decreased joint mobility, Decreased strength, Impaired muscle performance, Decreased activity tolerance, and Impaired posture. These impairments interfere with their ability to perform self care tasks, work tasks, recreational activities, household chores, driving , household mobility, and community mobility as compared to previous level of function.     Clinical Decision Making (Complexity):  Clinical Presentation: Stable/Uncomplicated  Clinical Presentation Rationale: based on medical and personal factors listed in PT evaluation  Clinical Decision Making (Complexity): Low complexity    PLAN OF CARE  Treatment Interventions:  Interventions: Manual Therapy, Neuromuscular Re-education, Therapeutic Activity, Therapeutic Exercise, Self-Care/Home Management    Long Term Goals     PT Goal 1  Goal Description: Patient will be able to drive with 0/10 pain.  Rationale: to maximize safety and independence with transportation  Target Date: 04/29/24      Frequency of Treatment: 1x/week  Duration of Treatment: for 4 weeks tapering down to 2x/month for 8 weeks    Recommended Referrals to Other Professionals:  None  Education Assessment:   Learner/Method: Patient;No Barriers to Learning    Risks and benefits of evaluation/treatment have been explained.   Patient/Family/caregiver agrees with Plan of Care.     Evaluation Time:     PT Bobby  Low Complexity Minutes (88492): 15    Signing Clinician: Rocky Barrientos PT

## 2024-02-05 ENCOUNTER — ANCILLARY PROCEDURE (OUTPATIENT)
Dept: MRI IMAGING | Facility: CLINIC | Age: 34
End: 2024-02-05
Attending: NURSE PRACTITIONER
Payer: COMMERCIAL

## 2024-02-05 ENCOUNTER — THERAPY VISIT (OUTPATIENT)
Dept: PHYSICAL THERAPY | Facility: CLINIC | Age: 34
End: 2024-02-05
Attending: NURSE PRACTITIONER
Payer: COMMERCIAL

## 2024-02-05 DIAGNOSIS — M54.16 RIGHT LUMBAR RADICULOPATHY: ICD-10-CM

## 2024-02-05 DIAGNOSIS — M54.41 ACUTE RIGHT-SIDED LOW BACK PAIN WITH RIGHT-SIDED SCIATICA: ICD-10-CM

## 2024-02-05 DIAGNOSIS — M51.369 DDD (DEGENERATIVE DISC DISEASE), LUMBAR: ICD-10-CM

## 2024-02-05 PROCEDURE — 72148 MRI LUMBAR SPINE W/O DYE: CPT | Mod: GC | Performed by: RADIOLOGY

## 2024-02-05 PROCEDURE — 97110 THERAPEUTIC EXERCISES: CPT | Mod: GP | Performed by: PHYSICAL THERAPIST

## 2024-02-05 PROCEDURE — 97161 PT EVAL LOW COMPLEX 20 MIN: CPT | Mod: GP | Performed by: PHYSICAL THERAPIST

## 2024-02-05 PROCEDURE — 97530 THERAPEUTIC ACTIVITIES: CPT | Mod: GP | Performed by: PHYSICAL THERAPIST

## 2024-02-13 ENCOUNTER — THERAPY VISIT (OUTPATIENT)
Dept: PHYSICAL THERAPY | Facility: CLINIC | Age: 34
End: 2024-02-13
Payer: COMMERCIAL

## 2024-02-13 DIAGNOSIS — M54.41 ACUTE RIGHT-SIDED LOW BACK PAIN WITH RIGHT-SIDED SCIATICA: Primary | ICD-10-CM

## 2024-02-13 PROCEDURE — 97530 THERAPEUTIC ACTIVITIES: CPT | Mod: GP | Performed by: PHYSICAL THERAPIST

## 2024-02-13 PROCEDURE — 97110 THERAPEUTIC EXERCISES: CPT | Mod: GP | Performed by: PHYSICAL THERAPIST

## 2024-02-20 ENCOUNTER — THERAPY VISIT (OUTPATIENT)
Dept: PHYSICAL THERAPY | Facility: CLINIC | Age: 34
End: 2024-02-20
Payer: COMMERCIAL

## 2024-02-20 DIAGNOSIS — M54.41 ACUTE RIGHT-SIDED LOW BACK PAIN WITH RIGHT-SIDED SCIATICA: Primary | ICD-10-CM

## 2024-02-20 PROCEDURE — 97110 THERAPEUTIC EXERCISES: CPT | Mod: GP | Performed by: PHYSICAL THERAPIST

## 2024-02-20 PROCEDURE — 97140 MANUAL THERAPY 1/> REGIONS: CPT | Mod: GP | Performed by: PHYSICAL THERAPIST

## 2024-02-27 ENCOUNTER — THERAPY VISIT (OUTPATIENT)
Dept: PHYSICAL THERAPY | Facility: CLINIC | Age: 34
End: 2024-02-27
Payer: COMMERCIAL

## 2024-02-27 DIAGNOSIS — M54.41 ACUTE RIGHT-SIDED LOW BACK PAIN WITH RIGHT-SIDED SCIATICA: Primary | ICD-10-CM

## 2024-02-27 PROCEDURE — 97110 THERAPEUTIC EXERCISES: CPT | Mod: GP | Performed by: PHYSICAL THERAPIST

## 2024-02-27 PROCEDURE — 97530 THERAPEUTIC ACTIVITIES: CPT | Mod: GP | Performed by: PHYSICAL THERAPIST

## 2024-04-05 ENCOUNTER — THERAPY VISIT (OUTPATIENT)
Dept: PHYSICAL THERAPY | Facility: CLINIC | Age: 34
End: 2024-04-05
Payer: COMMERCIAL

## 2024-04-05 DIAGNOSIS — M54.41 ACUTE RIGHT-SIDED LOW BACK PAIN WITH RIGHT-SIDED SCIATICA: Primary | ICD-10-CM

## 2024-04-05 PROCEDURE — 97140 MANUAL THERAPY 1/> REGIONS: CPT | Mod: GP

## 2024-04-05 PROCEDURE — 97110 THERAPEUTIC EXERCISES: CPT | Mod: GP

## 2024-04-05 PROCEDURE — 97530 THERAPEUTIC ACTIVITIES: CPT | Mod: GP

## 2024-04-09 ENCOUNTER — MYC MEDICAL ADVICE (OUTPATIENT)
Dept: PHYSICAL MEDICINE AND REHAB | Facility: CLINIC | Age: 34
End: 2024-04-09
Payer: COMMERCIAL

## 2024-04-09 DIAGNOSIS — M54.16 RIGHT LUMBAR RADICULOPATHY: Primary | ICD-10-CM

## 2024-04-10 ENCOUNTER — OFFICE VISIT (OUTPATIENT)
Dept: URGENT CARE | Facility: URGENT CARE | Age: 34
End: 2024-04-10
Payer: COMMERCIAL

## 2024-04-10 VITALS
HEIGHT: 72 IN | HEART RATE: 73 BPM | TEMPERATURE: 98.1 F | DIASTOLIC BLOOD PRESSURE: 75 MMHG | OXYGEN SATURATION: 97 % | WEIGHT: 182 LBS | BODY MASS INDEX: 24.65 KG/M2 | SYSTOLIC BLOOD PRESSURE: 124 MMHG | RESPIRATION RATE: 14 BRPM

## 2024-04-10 DIAGNOSIS — M54.41 ACUTE RIGHT-SIDED LOW BACK PAIN WITH RIGHT-SIDED SCIATICA: Primary | ICD-10-CM

## 2024-04-10 PROCEDURE — 99214 OFFICE O/P EST MOD 30 MIN: CPT | Performed by: INTERNAL MEDICINE

## 2024-04-10 RX ORDER — METHYLPREDNISOLONE 4 MG
TABLET, DOSE PACK ORAL
Qty: 21 TABLET | Refills: 0 | Status: SHIPPED | OUTPATIENT
Start: 2024-04-10 | End: 2024-05-08

## 2024-04-10 RX ORDER — CYCLOBENZAPRINE HCL 10 MG
5-10 TABLET ORAL 3 TIMES DAILY PRN
Qty: 15 TABLET | Refills: 0 | Status: SHIPPED | OUTPATIENT
Start: 2024-04-10 | End: 2024-05-08

## 2024-04-10 ASSESSMENT — PAIN SCALES - GENERAL: PAINLEVEL: MODERATE PAIN (4)

## 2024-04-10 NOTE — PATIENT INSTRUCTIONS
Physical Therapy later this week  Spine steroid injection per spine specialist 4/29.    Try medrol steroid pack.  Flexeril 5-10 mg up to 3 x day as needed.  No driving or operating heavy machinery on this.    Continue alternate ibuprofen & tylenol.    establish care

## 2024-04-10 NOTE — TELEPHONE ENCOUNTER
"Message from [facility] Viktoriya Cohen CNP: \"Please call patient to inquire about where his pain is.    Previously was right low back and right leg.  If this is still the case I would recommend trialing a right L4-5 and L5-S1 TFESI to see if we get further relief since he has not gotten relief with physical therapy.  His MRI previously was showing a disc herniation with foraminal stenosis on the right as well as right L5 nerve compression at the L4-5 level.\"     Phone call to patient to discuss symptoms. He reports that the pain is from right buttock down the entire back of his right leg to the ankle. Reports it had been in his buttock, around to hip and then don front of leg. It is down the back of leg and very painful. Reports he is on his way to urgent care for some pain medication at this time.    Offered the recommended injection. He would like to move forward with it. Order placed in Epic for the right L4-L5 and L5-S1 TFESI. Injection requirements reviewed in full with patient. Stated understanding. Transferred to scheduling to make appointment.    "

## 2024-04-10 NOTE — PROGRESS NOTES
ASSESSMENT AND PLAN:      ICD-10-CM    1. Acute right-sided low back pain with right-sided sciatica  M54.41 methylPREDNISolone (MEDROL DOSEPAK) 4 MG tablet therapy pack     cyclobenzaprine (FLEXERIL) 10 MG tablet          PLAN:      Patient Instructions         Physical Therapy later this week  Spine steroid injection per spine specialist 4/29.    Try medrol steroid pack.  Flexeril 5-10 mg up to 3 x day as needed.  No driving or operating heavy machinery on this.    Continue alternate ibuprofen & tylenol.    establish care           Trice Mosqueda MD  [facility]    Subjective     Franco Raymond is a 33 year old who presents for Patient presents with:  Back Pain  Urgent Care: Patient presents with lower right side back pain that he sustained back in November from an injury from a trampoline.Patient is incredible pain and is unable to sleep or sit.      an established patient of [facility].    Back Pain  Seen January with right-sided low back pain with sciatica -treatment Toradol shot, naproxen and Flexeril.  Physical therapy referral.  X-ray was normal   Also referral to spine specialist with appointment later in the month.  Treatment included physical therapy and MRI imaging  2/2024                                                                 IMPRESSION:  Multilevel lumbar spondylosis, most pronounced at L4-5 with moderate  stenosis and right central disc protrusion likely impinging upon the  traversing right L5 nerve root. No high-grade neural foraminal  stenosis.       Onset of recent back pain flare symptoms was 4 day(s) ago.  Location: right low back  Radiation: radiates into the right leg  Context: trigger - laid flat on floor, trying to get up from prone position was awful        Current and Associated symptoms: pain and shooting cramping pain from buttock to heal  Denies: fecal incontinence, urinary incontinence, and lower extremity weakness    Aggravating Factors: coughing and  changing position  Therapies to improve symptoms include: heat, ice, ibuprofen, Tylenol, stretching, and flexeril  Past history: recurrent self limited episodes of low back pain in the past since Nov 2023 - trampoline jumping      Review of Systems        Objective    /75   Pulse 73   Temp 98.1  F (36.7  C) (Temporal)   Resp 14   Ht 1.829 m (6')   Wt 82.6 kg (182 lb)   SpO2 97%   BMI 24.68 kg/m    Physical Exam  Vitals reviewed.   Constitutional:       Appearance: Normal appearance.   Musculoskeletal:      Comments: BACK: Lumbosacral spine area reveals local tenderness right side.  Painful and reduced LS ROM noted. Straight leg raise is positive at 45  degrees on bilateral but guards & tracks his leg with bending knee.  motor strength  with including heel and toe gait.     Neurological:      Mental Status: He is alert.

## 2024-04-12 ENCOUNTER — THERAPY VISIT (OUTPATIENT)
Dept: PHYSICAL THERAPY | Facility: CLINIC | Age: 34
End: 2024-04-12
Payer: COMMERCIAL

## 2024-04-12 DIAGNOSIS — M54.41 ACUTE RIGHT-SIDED LOW BACK PAIN WITH RIGHT-SIDED SCIATICA: Primary | ICD-10-CM

## 2024-04-12 PROCEDURE — 97530 THERAPEUTIC ACTIVITIES: CPT | Mod: GP

## 2024-04-12 PROCEDURE — 97110 THERAPEUTIC EXERCISES: CPT | Mod: GP

## 2024-04-12 PROCEDURE — 97140 MANUAL THERAPY 1/> REGIONS: CPT | Mod: GP

## 2024-04-13 ENCOUNTER — OFFICE VISIT (OUTPATIENT)
Dept: URGENT CARE | Facility: URGENT CARE | Age: 34
End: 2024-04-13
Payer: COMMERCIAL

## 2024-04-13 VITALS
DIASTOLIC BLOOD PRESSURE: 62 MMHG | OXYGEN SATURATION: 95 % | TEMPERATURE: 97.7 F | WEIGHT: 182 LBS | SYSTOLIC BLOOD PRESSURE: 136 MMHG | HEART RATE: 78 BPM | RESPIRATION RATE: 19 BRPM | BODY MASS INDEX: 24.68 KG/M2

## 2024-04-13 DIAGNOSIS — M54.41 ACUTE RIGHT-SIDED LOW BACK PAIN WITH RIGHT-SIDED SCIATICA: Primary | ICD-10-CM

## 2024-04-13 PROCEDURE — 99213 OFFICE O/P EST LOW 20 MIN: CPT | Performed by: PHYSICIAN ASSISTANT

## 2024-04-13 RX ORDER — TRAMADOL HYDROCHLORIDE 50 MG/1
50 TABLET ORAL 2 TIMES DAILY
Qty: 28 TABLET | Refills: 0 | Status: SHIPPED | OUTPATIENT
Start: 2024-04-13 | End: 2024-04-27

## 2024-04-13 ASSESSMENT — PAIN SCALES - GENERAL: PAINLEVEL: MODERATE PAIN (4)

## 2024-04-13 NOTE — PROGRESS NOTES
Acute right-sided low back pain with right-sided sciatica  - traMADol (ULTRAM) 50 MG tablet; Take 1 tablet (50 mg) by mouth 2 times daily for 14 days    General Tips for All Ages:    R.I.C.E Method:  Why: This helps reduce swelling and promotes healing.  What to Do:  Rest: Allow the injured area to rest.  Ice: Apply an ice pack for 15-20 minutes every 2-3 hours.  Compression: Use a compression bandage to control swelling.  Elevation: Elevate the injured limb to reduce swelling.    Over-the-Counter (OTC) Pain Relievers:  Why: Manages pain and reduces inflammation.  What to Use:  All Ages: Acetaminophen or ibuprofen as directed on the package.    Avoid H.A.R.M:  Why: To prevent further injury.  What to Avoid:  Heat: Avoid heat packs initially.  Alcohol: Can increase swelling.  Running: Until you receive clearance from a healthcare provider.  Massage: Avoid direct pressure on the injured area.    For Adults (18 Years and Older):    Physical Therapy (if prescribed):  Why: Helps restore strength and flexibility.  What to Do: Follow the prescribed exercises and attend therapy sessions.    Bracing/Taping (if prescribed):  Why: Provides support during recovery.  What to Do: Use braces or tape as directed by your healthcare provider.    For Adolescents (12-17 Years):    OTC Pain Relievers:  Why: Manages pain and reduces inflammation.  What to Use: Acetaminophen or ibuprofen as directed on the package.    Physical Therapy (if prescribed):  Why: Aids in regaining strength and flexibility.  What to Do: Follow the exercises given by your physical therapist.    For Children (Under 12 Years):    Pediatrician Consultation:  Why: Children's injuries may need specialized care.  What to Do: Consult your child's pediatrician for guidance.    OTC Pain Relievers (if approved by pediatrician):  Why: Manages pain and reduces inflammation.  What to Use: Follow your pediatrician's advice on using acetaminophen or ibuprofen.    All  Ages:    Hydration and Nutrition:  Why: Essential for healing.  What to Do: Stay hydrated and ensure a balanced diet rich in vitamins and minerals.    Restorative Sleep:  Why: Crucial for recovery.  What to Do: Ensure you get adequate and quality sleep.  Follow-Up:    Patient has an appointment with his spine specialist for a epidural injection in 2 weeks time.  Patient educated on red flag symptoms and asked to go directly to the ED if these symptoms present themselves.     Remember, individual cases may vary, and it's crucial to follow your healthcare provider's advice. If you have concerns or if symptoms persist, don't hesitate to reach out for further guidance.      Perry Dodson PA-C  [facility]    Subjective   33 year old who presents to clinic today for the following health issues:    Urgent Care       HPI   Where in your body do you have pain? Back Pain  Onset/Duration:     Buldging disc pain in back   Patient was given a medrol dose pack and cyclobenzaprine last visit 3 days ago and did not find this to be helpful. MRI in Feb showed right central disc protrusion likely impinging upon the traversing right L5 nerve root.  Description:   Location of pain: low back right  Character of pain: sharp  Pain radiation: Right leg to the ankle   New numbness or weakness in legs, not attributed to pain: no   Intensity: moderate to severe   Progression of Symptoms: worsening  History:   Specific cause: Patient had a injury on a trampoline several months ago- pain since then despite pain meds and PT   Pain interferes with job: YES  History of back problems: no prior back problems  Any previous MRI or X-rays: Yes- at [facility].    Sees a specialist for back pain: Patient is establish with a spine specialist.   Alleviating factors:   Improved by: Prednisone, methylprednisolone, acetaminophen (Tylenol), cold, heat, muscle relaxants, and NSAIDs      Patient has an appointment on 4/26/24 for a epidural  injection     Review of Systems   Review of Systems   See HPI    Objective    Temp: 97.7  F (36.5  C) Temp src: Temporal BP: 136/62 Pulse: 78   Resp: 19 SpO2: 95 %       Physical Exam   Physical Exam  Constitutional:       General: He is not in acute distress.     Appearance: Normal appearance. He is not ill-appearing, toxic-appearing or diaphoretic.      Comments: Patient is visibly in severe pain with any sort of lumbar spine movements.  He is slow to ambulate.  He seems to have full range of motion however bending forward makes his pain worse and walking makes his pain worse.   HENT:      Head: Normocephalic and atraumatic.   Cardiovascular:      Rate and Rhythm: Normal rate.      Pulses: Normal pulses.   Pulmonary:      Effort: Pulmonary effort is normal. No respiratory distress.   Neurological:      General: No focal deficit present.      Mental Status: He is alert and oriented to person, place, and time. Mental status is at baseline.      Gait: Gait normal.   Psychiatric:         Mood and Affect: Mood normal.         Behavior: Behavior normal.         Thought Content: Thought content normal.         Judgment: Judgment normal.          No results found for this or any previous visit (from the past 24 hour(s)).

## 2024-04-15 ENCOUNTER — RADIOLOGY INJECTION OFFICE VISIT (OUTPATIENT)
Dept: PHYSICAL MEDICINE AND REHAB | Facility: CLINIC | Age: 34
End: 2024-04-15
Attending: NURSE PRACTITIONER
Payer: COMMERCIAL

## 2024-04-15 ENCOUNTER — TELEPHONE (OUTPATIENT)
Dept: PHYSICAL MEDICINE AND REHAB | Facility: CLINIC | Age: 34
End: 2024-04-15

## 2024-04-15 VITALS
RESPIRATION RATE: 16 BRPM | OXYGEN SATURATION: 100 % | DIASTOLIC BLOOD PRESSURE: 78 MMHG | HEART RATE: 59 BPM | SYSTOLIC BLOOD PRESSURE: 120 MMHG

## 2024-04-15 DIAGNOSIS — M54.16 RIGHT LUMBAR RADICULOPATHY: ICD-10-CM

## 2024-04-15 PROCEDURE — 64483 NJX AA&/STRD TFRM EPI L/S 1: CPT | Mod: RT | Performed by: PAIN MEDICINE

## 2024-04-15 PROCEDURE — 64484 NJX AA&/STRD TFRM EPI L/S EA: CPT | Mod: RT | Performed by: PAIN MEDICINE

## 2024-04-15 RX ORDER — DEXAMETHASONE SODIUM PHOSPHATE 10 MG/ML
INJECTION, SOLUTION INTRAMUSCULAR; INTRAVENOUS
Status: COMPLETED | OUTPATIENT
Start: 2024-04-15 | End: 2024-04-15

## 2024-04-15 RX ORDER — LIDOCAINE HYDROCHLORIDE 10 MG/ML
INJECTION, SOLUTION EPIDURAL; INFILTRATION; INTRACAUDAL; PERINEURAL
Status: COMPLETED | OUTPATIENT
Start: 2024-04-15 | End: 2024-04-15

## 2024-04-15 RX ADMIN — LIDOCAINE HYDROCHLORIDE 4 ML: 10 INJECTION, SOLUTION EPIDURAL; INFILTRATION; INTRACAUDAL; PERINEURAL at 14:42

## 2024-04-15 RX ADMIN — DEXAMETHASONE SODIUM PHOSPHATE 20 MG: 10 INJECTION, SOLUTION INTRAMUSCULAR; INTRAVENOUS at 14:42

## 2024-04-15 ASSESSMENT — PAIN SCALES - GENERAL
PAINLEVEL: EXTREME PAIN (8)
PAINLEVEL: MODERATE PAIN (4)

## 2024-04-15 NOTE — TELEPHONE ENCOUNTER
[facility] Call Center    Phone Message    May a detailed message be left on voicemail: yes     Reason for Call: Other: patient calling as he wants to move the injection up. He also sent a mychart message.  As he is having increased pain and doesn't think he can wait much longer.      Action Taken: Other: Te     Travel Screening: Not Applicable

## 2024-04-15 NOTE — PATIENT INSTRUCTIONS
DISCHARGE INSTRUCTIONS    During office hours (8:00 a.m.- 4:00 p.m.) questions or concerns may be answered  by calling Spine Center Navigation Nurses at  [phone].  Messages received after hours will be returned the following business day.      In the case of an emergency, please dial 911 or seek assistance at the nearest Emergency Room/Urgent Care facility.     All Patients:    You may experience an increase in your symptoms for the first 2 days (It may take anywhere between 2 days- 2 weeks for the steroid to have maximum effect).    You may use ice on the injection site, as frequently as 20 minutes each hour if needed.    You may take your pain medicine.    You may continue taking your regular medication after your injection. If you have had a Medial Branch Block you may resume pain medication once your pain diary is completed.    You may shower. No swimming, tub bath or hot tub for 48 hours.  You may remove your bandaid/bandage as soon as you are home.    You may resume light activities, as tolerated.    Resume your usual diet as tolerated.    It is strongly advised that you do not drive for 1-3 hours post injection.    If you have had oral sedation:  Do not drive for 8 hours post injection.      If you have had IV sedation:  Do not drive for 24 hours post injection.  Do not operate hazardous machinery or make important personal/business decisions for 24 hours.      POSSIBLE STEROID SIDE EFFECTS (If steroid/cortisone was used for your procedure)    -If you experience these symptoms, it should only last for a short period    Swelling of the legs              Skin redness (flushing)     Mouth (oral) irritation   Blood sugar (glucose) levels            Sweats                    Mood changes  Headache  Sleeplessness  Weakened immune system for up to 14 days, which could increase the risk of bautista the COVID-19 virus and/or experiencing more severe symptoms of the disease, if exposed.  Decreased  effectiveness of the flu vaccine if given within 2 weeks of the steroid.         POSSIBLE PROCEDURE SIDE EFFECTS  -Call the Spine Center if you are concerned  Increased Pain           Increased numbness/tingling      Nausea/Vomiting          Bruising/bleeding at site      Redness or swelling                                              Difficulty walking      Weakness           Fever greater than 100.5    *In the event of a severe headache after an epidural steroid injection that is relieved by lying down, please call the [facility] to speak with a clinical staff member*

## 2024-04-19 ENCOUNTER — MYC MEDICAL ADVICE (OUTPATIENT)
Dept: PHYSICAL MEDICINE AND REHAB | Facility: CLINIC | Age: 34
End: 2024-04-19

## 2024-04-19 DIAGNOSIS — M54.16 RIGHT LUMBAR RADICULOPATHY: Primary | ICD-10-CM

## 2024-04-19 RX ORDER — GABAPENTIN 300 MG/1
CAPSULE ORAL
Qty: 90 CAPSULE | Refills: 0 | Status: SHIPPED | OUTPATIENT
Start: 2024-04-19 | End: 2024-05-22

## 2024-04-19 NOTE — TELEPHONE ENCOUNTER
Will send gabapentin 300mg to be gradually up-titrated to one tablet 3 x daily per medication instructions.   I would also recommend he start an anti-inflammatory such as ibuprofen 600mg 3x daily otc to see if this helps

## 2024-04-19 NOTE — TELEPHONE ENCOUNTER
Pt sent [employer] message this morning and called [facility] this afternoon. He had an urgent steroid injection on Monday, which has improved his symptoms slightly, but he's still unable to sleep. He states his Flexaril and tramadol are not helpful. He is wondering if he could try gabapentin at the suggestion of his PT.

## 2024-04-19 NOTE — TELEPHONE ENCOUNTER
Phone call to patient to relay new recommendations. Discussed the titration of the Gabapentin. Encouraged pt to alternate 2 Extra Strength Tylenol tablets with 400-600 mg Ibuprofen with food every 8 hours. Encouraged to be cautious with taking the Gabapentin, Flexeril and Tramadol at the same time; recommended he stagger them in times. Stated understanding.

## 2024-04-24 NOTE — PROGRESS NOTES
Assessment:     Diagnoses and all orders for this visit:  Right lumbar radiculopathy  -     Adult Neurosurgery  Referral; Future  Acute right-sided low back pain with right-sided sciatica  Protrusion of lumbar intervertebral disc  -     Adult Neurosurgery  Referral; Future  Lumbar foraminal stenosis  -     Adult Neurosurgery  Referral; Future     Franco Raymond is a 33 year old y.o. 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  No relief with physical therapy.     Plan:     A shared decision making plan was used. The patient's values and choices were respected. Prior medical records were reviewed today. The following represents what was discussed and decided upon by the provider and the patient.        -DIAGNOSTIC TESTS: Images were personally reviewed and interpreted.   -- Lumbar spine MRI 2/5/2024 with L4-5 moderate disc bulge on the right with disc protrusion with right L5 nerve compression in the lateral recess with mild bilateral foraminal stenosis and moderate central stenosis.  L5-S1 left disc protrusion with mild bilateral foraminal stenosis.  -- Lumbar spine x-ray 1/26/2024 with normal alignment and disc height.  Minimal facet arthropathy lower lumbar spine.     -INTERVENTIONS: No further injection recommendations at this time.      -Referral to [facility] neurosurgeon Dr. Sorensen for surgical evaluation at this time since he has failed conservative treatments with physical therapy and injections.    -MEDICATIONS: Tramadol as needed for severe breakthrough pain, he does not need a refill today he reports.  Can continue with gabapentin as well.  Discussed side effects of medications and proper use. Patient verbalized understanding.    -PHYSICAL THERAPY:  Encourage patient to continue with home exercises from prior physical therapy sessions.  Discussed the importance of core strengthening, ROM, stretching exercises with the patient and how each of these entities is important in decreasing pain.  Explained to the patient that the purpose of physical therapy is to teach the patient a home exercise program.  These exercises need to be performed every day in order to decrease pain and prevent future occurrences of pain.        -PATIENT EDUCATION:  Total time of 32 minutes, on the day of service, spent with the patient, reviewing the chart, placing orders, and documenting.     -FOLLOW UP: Follow-up for surgical opinion  Advised to contact clinic if symptoms worsen or change.    Subjective:     Franco Raymond is a 33 year old male who presents today for follow-up regarding ongoing right low back pain that is been initially ongoing for the last 6 months however progressive in the last couple of months that radiates into the right anterior thigh lateral thigh and posterior thigh as well as lateral shin with associated perceived lower extremity weakness.  Patient reports significant relief for the first couple of days with recent right L4-5 and L5-S1 TFESI but unfortunately no lasting benefit.  Currently pain is a 2/10 up to a 10 at its worst with certain types of movement specifically prolonged walking standing laying down in bed as well as coughing.  Does report sitting with support is his most comfortable position, medication helps and ice gives him some benefit.  He is frustrated at this is affecting his normal day-to-day activities and has been now for some time.    He otherwise historically is very active and does jujitsu, has not been able to do that recently and his goal is to be able to get to a place where he can do that again.    -Treatment to Date: No prior spinal surgery   No prior physical therapy  Chiropractic care in the last 8 weeks x 2 with short-term  relief    Right L4-5 and L5-S1 TFESI 4/15/2024 with 100% relief for the first couple of hours, 10-20% lasting benefit     -Medications:  Cyclobenzaprine  Naproxen 550 mg  Toradol injection PCP 1/26/2024     Past medications:  Meloxicam  Methocarbamol    Patient Active Problem List   Diagnosis    Calculus of kidney    Thyrotoxicosis    Acute right-sided low back pain with right-sided sciatica    Right lumbar radiculopathy       Current Outpatient Medications   Medication Sig Dispense Refill    gabapentin (NEURONTIN) 300 MG capsule Take 300mg po at bedtime for 3 days, then twice daily x 3 days, then three times daily. 90 capsule 0    methylPREDNISolone (MEDROL DOSEPAK) 4 MG tablet therapy pack Follow Package Directions 21 tablet 0    NO ACTIVE MEDICATIONS  0 0    predniSONE (DELTASONE) 10 MG tablet Prednisone 10 mg tablet, 3 tablets p.o. daily for 3 days, then 2 tablets p.o. daily for 3 days, then 1 tablet p.o. daily for 3 days then stop. 18 tablet 0    cyclobenzaprine (FLEXERIL) 10 MG tablet Take 0.5-1 tablets (5-10 mg) by mouth 3 times daily as needed for muscle spasms (Patient not taking: Reported on 4/29/2024) 15 tablet 0     No current facility-administered medications for this visit.       Allergies   Allergen Reactions    Compazine [Prochlorperazine]      Dystonic jaw movements         Past Medical History:   Diagnosis Date    Calculus of kidney 10/06    Thyrotoxicosis without mention of goiter or other cause, without mention of thyrotoxic crisis or storm         Review of Systems  ROS:  Specifically negative for bowel/bladder dysfunction, balance changes, headache, dizziness, foot drop, fevers, chills, appetite changes, nausea/vomiting, unexplained weight loss. Otherwise 13 systems reviewed are negative. Please see the patient's intake questionnaire from today for details.    Reviewed Social, Family, Past Medical and Past Surgical history with patient, no significant changes noted since prior visit.      Objective:     BP (!) 140/90 (BP Location: Left arm, Patient Position: Sitting, Cuff Size: Adult Large)   Pulse 70   Ht 6' (1.829 m)   Wt 185 lb 1.6 oz (84 kg)   SpO2 100%   BMI 25.10 kg/m      PHYSICAL EXAMINATION:    --CONSTITUTIONAL: Well developed, well nourished, healthy appearing individual.  --PSYCHIATRIC: Appropriate mood and affect. No difficulty interacting due to temper, social withdrawal, or memory issues.  --SKIN: Lumbar region is dry and intact.   --RESPIRATORY: Normal rhythm and effort. No abnormal accessory muscle breathing patterns noted.   --MUSCULOSKELETAL:  Normal lumbar lordosis noted, no lateral shift.  --GROSS MOTOR: Easily arises from a seated position. Gait is non-antalgic  --LUMBAR SPINE:  Inspection reveals no evidence of deformity.      RESULTS:   Imaging: Spine imaging was reviewed today. The images were shown to the patient and the findings were explained using a spine model.      Lumbar spine x-ray and MRI reviewed

## 2024-04-26 ENCOUNTER — THERAPY VISIT (OUTPATIENT)
Dept: PHYSICAL THERAPY | Facility: CLINIC | Age: 34
End: 2024-04-26
Payer: COMMERCIAL

## 2024-04-26 DIAGNOSIS — M54.16 RIGHT LUMBAR RADICULOPATHY: ICD-10-CM

## 2024-04-26 DIAGNOSIS — M54.41 ACUTE RIGHT-SIDED LOW BACK PAIN WITH RIGHT-SIDED SCIATICA: Primary | ICD-10-CM

## 2024-04-26 PROCEDURE — 97110 THERAPEUTIC EXERCISES: CPT | Mod: 59

## 2024-04-26 PROCEDURE — 97530 THERAPEUTIC ACTIVITIES: CPT | Mod: GP

## 2024-04-26 NOTE — Clinical Note
Viktoriya,  You are following up with Johnny on 4/29/24 for ongoing management of LBP with right sided radiculopathy.   He has been having worsening symptoms since returning to PT on 4/5/24 with no improvement with PT exercises, recent epidural CSI on 4/15/24, and consistent use of pain medications.  We have tracked worsening myotomal weakness at L4-L5 of RLE in today's session, no loss of sensation or bowel/bladder changes. He is very pleasant but in constant severe amounts of pain with significant limitation.  We told him we are recommending to hold off on PT for now and talk about next steps in you're upcoming appointment.  Please let me know if you have any questions. Thanks!  - Ander Walters PT, DPT

## 2024-04-26 NOTE — PROGRESS NOTES
PLAN  Johnny has been working with physical therapy since 12/11/23 for low back pain with left sided radicular symptoms. He has seen physical therapy for 7 visits total over this time period with initially noting a period of improvement, but recently came back to PT on 4/5/24 with worsening pain intensity and radicular symptoms. Over the course of the past few weeks he has been unable to obtain any improvement in symptoms with PT exercises, epidural CSI at L4-L5, and heavy consistency with pain medications. At today's visit, we have noticed a slight progression of worsening myotomes at L4-L5 on his right lower extremity. No changes in bowel/bladder function, sensation, or DTRs. Today we recommended patient follow up with PMR to talk about next steps in his low back pain management. PT is no longer indicated at this time.    Beginning/End Dates of Progress Note Reporting Period:  02/05/24 to 04/26/2024    Referring Provider:  Viktoriya Cohen       04/26/24 0500   Appointment Info   Signing clinician's name / credentials Ander Walters PT, DPT   Total/Authorized Visits 8 E+T   Visits Used 7   Medical Diagnosis Acute right-sided low back pain with right-sided sciatica   PT Tx Diagnosis Acute right-sided low back pain with right-sided sciatica   Other pertinent information brazilian jiu jitsu   Progress Note/Certification   Onset of illness/injury or Date of Surgery 12/11/23   Therapy Frequency 1x/week   Predicted Duration for 4 weeks tapering down to 2x/month for 8 weeks   Progress Note Completed Date 02/05/24   PT Goal 1   Goal Description Patient will be able to drive with 0/10 pain.   Rationale to maximize safety and independence with transportation   Goal Progress No change   Target Date 04/29/24   Subjective Report   Subjective Report Patient received epidural CSI on 4/15/24 and has switched over medication to gabapentin recently. Overall not noticing any big chances in his pain or symptoms. Is unable to  perform any HEP due to discomfort at this time. Has follow up appointment with PMR on 4/29/24 to talk about next steps.   Objective Measures   Objective Measures Objective Measure 1;Objective Measure 2;Objective Measure 3   Objective Measure 1   Objective Measure Lumbar AROM   Details Severe loss both directions - no directional prefernce   Objective Measure 2   Objective Measure Dermatomes   Details WNL   Objective Measure 3   Objective Measure Myotomes   Details Mild weakness on R L4 and L5 (worse since last session on 4/12/24)   Treatment Interventions (PT)   Interventions Therapeutic Procedure/Exercise;Therapeutic Activity;Manual Therapy   Therapeutic Procedure/Exercise   Therapeutic Procedures: strength, endurance, ROM, flexibility minutes (43841) 15   Ther Proc 1 Cat Cow - gentle   Ther Proc 1 - Details x20 reps today - very gentle   Ther Proc 2 Supine posterior pelvic tilt   Ther Proc 2 - Details x20 reps - very gentle   PTRx Ther Proc 1 Single knee to chest   PTRx Ther Proc 1 - Details NT   PTRx Ther Proc 2 Supine Lower Trunk Rotations   PTRx Ther Proc 2 - Details x20 reps - very gentle   Skilled Intervention Safe spinal mobility   Patient Response/Progress Patient tolerated well   Therapeutic Activity   Therapeutic Activities: dynamic activities to improve functional performance minutes (18244) 25   Ther Act 1 Activity Recommendations   Ther Act 1 - Details Discussed staying active, doing gentle exercises as tolerated frequently   Ther Act 2 Patient Education   Ther Act 2 - Details Lengthy discussion on conservative management effectiveness, recommended patient follow up with referring provider for next steps as symptoms have not changes and actually worsened over time, considering high levels of pain and limitation as well   PTRx Ther Act 1 Transfer Training   PTRx Ther Act 1 - Details VR - Discussed log roll technique for bed mobility   Skilled Intervention Improve patient understanding   Patient  Response/Progress Patient appreciated recommendations   Education   Learner/Method Patient;No Barriers to Learning   Plan   Plan for next session Refer to provider for failed conservative management   Total Session Time   Timed Code Treatment Minutes 40   Total Treatment Time (sum of timed and untimed services) 40

## 2024-04-29 ENCOUNTER — TELEPHONE (OUTPATIENT)
Dept: NEUROSURGERY | Facility: CLINIC | Age: 34
End: 2024-04-29

## 2024-04-29 ENCOUNTER — OFFICE VISIT (OUTPATIENT)
Dept: PHYSICAL MEDICINE AND REHAB | Facility: CLINIC | Age: 34
End: 2024-04-29
Payer: COMMERCIAL

## 2024-04-29 VITALS
BODY MASS INDEX: 25.07 KG/M2 | DIASTOLIC BLOOD PRESSURE: 90 MMHG | WEIGHT: 185.1 LBS | OXYGEN SATURATION: 100 % | HEART RATE: 70 BPM | HEIGHT: 72 IN | SYSTOLIC BLOOD PRESSURE: 140 MMHG

## 2024-04-29 DIAGNOSIS — M51.26 PROTRUSION OF LUMBAR INTERVERTEBRAL DISC: ICD-10-CM

## 2024-04-29 DIAGNOSIS — M54.16 RIGHT LUMBAR RADICULOPATHY: Primary | ICD-10-CM

## 2024-04-29 DIAGNOSIS — M54.41 ACUTE RIGHT-SIDED LOW BACK PAIN WITH RIGHT-SIDED SCIATICA: ICD-10-CM

## 2024-04-29 DIAGNOSIS — M48.061 LUMBAR FORAMINAL STENOSIS: ICD-10-CM

## 2024-04-29 PROCEDURE — 99214 OFFICE O/P EST MOD 30 MIN: CPT | Performed by: NURSE PRACTITIONER

## 2024-04-29 ASSESSMENT — PAIN SCALES - GENERAL: PAINLEVEL: MILD PAIN (3)

## 2024-04-29 NOTE — PATIENT INSTRUCTIONS
~Spine Center Scheduling #[phone].  ~Please call our [facility] Spine Nurse Navigation #[phone] with any questions or concerns about your treatment plan, if symptoms worsen and you would like to be seen urgently, or if you have problems controlling bladder and bowel function.  ~For any future flareups or new symptoms, recommend follow-up in clinic or contact the nurse navigator line.  ~Please note that any My Chart messages may take multiple days for a response due to the high volume of patients seen in clinic.  Anything sent Thursday night or after will be answered the following week when able, as Viktoriya Cohen CNP does not work in clinic on Fridays.   ~Viktoriya Cohen CNP is at the [facility] on the first and third Tuesdays of the month only, otherwise primarily at the [facility].        Importance of specialized Physical Therapy: Discussed the importance of core strengthening, ROM, stretching exercises with the patient and how each of these entities is important in decreasing pain.  Explained to the patient that the purpose of physical therapy is to teach the patient a home exercise program individualized to them and their specific health concerns.  These exercises need to be performed every day in order to decrease pain and prevent future occurrences of pain.           You have been referred to [facility] Neurosurgery for surgical consult.  They will callyou to schedule an appointment at the Spine Center.    Neurosurgery Scheduling phone #: [phone]

## 2024-04-29 NOTE — TELEPHONE ENCOUNTER
SPINE PATIENTS - NEW PROTOCOL PREVISIT    RECORDS RECEIVED FROM: Referred by Viktoriya Cohen CNP   REASON FOR VISIT: Right lumbar radiculopathy   Protrusion of lumbar intervertebral disc   Lumbar foraminal stenosis    PROVIDER: Edu   DATE OF APPT: 04/30/2024   NOTES (FOR ALL VISITS) STATUS DETAILS   OFFICE NOTE from referring provider Internal Referral and notes in chart   OFFICE NOTE from other specialist Internal Physical therapy:  Last completed 04/26/2024  Injections: 04/15/2024   DISCHARGE SUMMARY from hospital N/A    DISCHARGE REPORT from ER N/A    OPERATIVE REPORT N/A    EMG REPORT N/A    MEDICATION LIST N/A    IMAGING  (FOR ALL VISITS)     MRI (HEAD, NECK, SPINE) Internal MRI Lumbar 02/05/2024   XRAY (SPINE) *NEUROSURGERY* Internal XR  Lumbar 01/26/2024, nothing recent   CT (HEAD, NECK, SPINE) N/A

## 2024-04-29 NOTE — LETTER
4/29/2024         RE: Franco Raymond  [address]  [facility] [address]        Dear Colleague,    Thank you for referring your patient, Franco Raymond, to the [facility]. Please see a copy of my visit note below.      Assessment:     Diagnoses and all orders for this visit:  Right lumbar radiculopathy  -     Adult Neurosurgery  Referral; Future  Acute right-sided low back pain with right-sided sciatica  Protrusion of lumbar intervertebral disc  -     Adult Neurosurgery  Referral; Future  Lumbar foraminal stenosis  -     Adult Neurosurgery  Referral; Future     Franco Raymond is a 33 year old y.o. 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  No relief with physical therapy.     Plan:     A shared decision making plan was used. The patient's values and choices were respected. Prior medical records were reviewed today. The following represents what was discussed and decided upon by the provider and the patient.        -DIAGNOSTIC TESTS: Images were personally reviewed and interpreted.   -- Lumbar spine MRI 2/5/2024 with L4-5 moderate disc bulge on the right with disc protrusion with right L5 nerve compression in the lateral recess with mild bilateral foraminal stenosis and moderate central stenosis.  L5-S1 left disc protrusion with mild bilateral foraminal stenosis.  -- Lumbar spine x-ray 1/26/2024 with normal alignment and disc height.  Minimal facet arthropathy lower lumbar spine.     -INTERVENTIONS: No further injection recommendations at this time.      -Referral to [facility] neurosurgeon Dr. Sorensen for surgical evaluation at this time since he has failed conservative treatments with physical therapy and  injections.    -MEDICATIONS: Tramadol as needed for severe breakthrough pain, he does not need a refill today he reports.  Can continue with gabapentin as well.  Discussed side effects of medications and proper use. Patient verbalized understanding.    -PHYSICAL THERAPY: Encourage patient to continue with home exercises from prior physical therapy sessions.  Discussed the importance of core strengthening, ROM, stretching exercises with the patient and how each of these entities is important in decreasing pain.  Explained to the patient that the purpose of physical therapy is to teach the patient a home exercise program.  These exercises need to be performed every day in order to decrease pain and prevent future occurrences of pain.        -PATIENT EDUCATION:  Total time of 32 minutes, on the day of service, spent with the patient, reviewing the chart, placing orders, and documenting.     -FOLLOW UP: Follow-up for surgical opinion  Advised to contact clinic if symptoms worsen or change.    Subjective:     Franco Raymond is a 33 year old male who presents today for follow-up regarding ongoing right low back pain that is been initially ongoing for the last 6 months however progressive in the last couple of months that radiates into the right anterior thigh lateral thigh and posterior thigh as well as lateral shin with associated perceived lower extremity weakness.  Patient reports significant relief for the first couple of days with recent right L4-5 and L5-S1 TFESI but unfortunately no lasting benefit.  Currently pain is a 2/10 up to a 10 at its worst with certain types of movement specifically prolonged walking standing laying down in bed as well as coughing.  Does report sitting with support is his most comfortable position, medication helps and ice gives him some benefit.  He is frustrated at this is affecting his normal day-to-day activities and has been now for some time.    He otherwise historically is very  active and does zeynep, has not been able to do that recently and his goal is to be able to get to a place where he can do that again.    -Treatment to Date: No prior spinal surgery   No prior physical therapy  Chiropractic care in the last 8 weeks x 2 with short-term relief    Right L4-5 and L5-S1 TFESI 4/15/2024 with 100% relief for the first couple of hours, 10-20% lasting benefit     -Medications:  Cyclobenzaprine  Naproxen 550 mg  Toradol injection PCP 1/26/2024     Past medications:  Meloxicam  Methocarbamol    Patient Active Problem List   Diagnosis     Calculus of kidney     Thyrotoxicosis     Acute right-sided low back pain with right-sided sciatica     Right lumbar radiculopathy       Current Outpatient Medications   Medication Sig Dispense Refill     gabapentin (NEURONTIN) 300 MG capsule Take 300mg po at bedtime for 3 days, then twice daily x 3 days, then three times daily. 90 capsule 0     methylPREDNISolone (MEDROL DOSEPAK) 4 MG tablet therapy pack Follow Package Directions 21 tablet 0     NO ACTIVE MEDICATIONS  0 0     predniSONE (DELTASONE) 10 MG tablet Prednisone 10 mg tablet, 3 tablets p.o. daily for 3 days, then 2 tablets p.o. daily for 3 days, then 1 tablet p.o. daily for 3 days then stop. 18 tablet 0     cyclobenzaprine (FLEXERIL) 10 MG tablet Take 0.5-1 tablets (5-10 mg) by mouth 3 times daily as needed for muscle spasms (Patient not taking: Reported on 4/29/2024) 15 tablet 0     No current facility-administered medications for this visit.       Allergies   Allergen Reactions     Compazine [Prochlorperazine]      Dystonic jaw movements         Past Medical History:   Diagnosis Date     Calculus of kidney 10/06     Thyrotoxicosis without mention of goiter or other cause, without mention of thyrotoxic crisis or storm         Review of Systems  ROS:  Specifically negative for bowel/bladder dysfunction, balance changes, headache, dizziness, foot drop, fevers, chills, appetite changes,  nausea/vomiting, unexplained weight loss. Otherwise 13 systems reviewed are negative. Please see the patient's intake questionnaire from today for details.    Reviewed Social, Family, Past Medical and Past Surgical history with patient, no significant changes noted since prior visit.     Objective:     BP (!) 140/90 (BP Location: Left arm, Patient Position: Sitting, Cuff Size: Adult Large)   Pulse 70   Ht 6' (1.829 m)   Wt 185 lb 1.6 oz (84 kg)   SpO2 100%   BMI 25.10 kg/m      PHYSICAL EXAMINATION:    --CONSTITUTIONAL: Well developed, well nourished, healthy appearing individual.  --PSYCHIATRIC: Appropriate mood and affect. No difficulty interacting due to temper, social withdrawal, or memory issues.  --SKIN: Lumbar region is dry and intact.   --RESPIRATORY: Normal rhythm and effort. No abnormal accessory muscle breathing patterns noted.   --MUSCULOSKELETAL:  Normal lumbar lordosis noted, no lateral shift.  --GROSS MOTOR: Easily arises from a seated position. Gait is non-antalgic  --LUMBAR SPINE:  Inspection reveals no evidence of deformity.      RESULTS:   Imaging: Spine imaging was reviewed today. The images were shown to the patient and the findings were explained using a spine model.      Lumbar spine x-ray and MRI reviewed                  Again, thank you for allowing me to participate in the care of your patient.        Sincerely,        Viktoriya Cohen, CNP

## 2024-04-30 ENCOUNTER — PRE VISIT (OUTPATIENT)
Dept: NEUROSURGERY | Facility: CLINIC | Age: 34
End: 2024-04-30

## 2024-04-30 ENCOUNTER — OFFICE VISIT (OUTPATIENT)
Dept: NEUROSURGERY | Facility: CLINIC | Age: 34
End: 2024-04-30
Attending: NURSE PRACTITIONER
Payer: COMMERCIAL

## 2024-04-30 ENCOUNTER — PREP FOR PROCEDURE (OUTPATIENT)
Dept: NEUROLOGY | Facility: CLINIC | Age: 34
End: 2024-04-30

## 2024-04-30 ENCOUNTER — HOSPITAL ENCOUNTER (OUTPATIENT)
Dept: RADIOLOGY | Facility: HOSPITAL | Age: 34
Discharge: HOME | End: 2024-04-30
Attending: SURGERY | Admitting: SURGERY
Payer: COMMERCIAL

## 2024-04-30 VITALS
OXYGEN SATURATION: 98 % | HEIGHT: 72 IN | HEART RATE: 70 BPM | BODY MASS INDEX: 25.06 KG/M2 | DIASTOLIC BLOOD PRESSURE: 91 MMHG | WEIGHT: 185 LBS | SYSTOLIC BLOOD PRESSURE: 130 MMHG

## 2024-04-30 DIAGNOSIS — M51.26 LUMBAR DISC HERNIATION: Primary | ICD-10-CM

## 2024-04-30 DIAGNOSIS — M54.16 RIGHT LUMBAR RADICULOPATHY: ICD-10-CM

## 2024-04-30 DIAGNOSIS — M54.16 LUMBAR RADICULOPATHY: Primary | ICD-10-CM

## 2024-04-30 PROCEDURE — 72110 X-RAY EXAM L-2 SPINE 4/>VWS: CPT

## 2024-04-30 PROCEDURE — 99204 OFFICE O/P NEW MOD 45 MIN: CPT | Performed by: SURGERY

## 2024-04-30 ASSESSMENT — PAIN SCALES - GENERAL: PAINLEVEL: NO PAIN (1)

## 2024-04-30 NOTE — NURSING NOTE
Franco Raymond is a 33 year old male who presents for:  Chief Complaint   Patient presents with    Consult     Lumbar  Right leg pain  Hip to foot  Symptoms began 6 months ago - trampoline  Injection brought immediate relief but rapidly faded          Initial Vitals:  BP (!) 130/91   Pulse 70   Ht 6' (1.829 m)   Wt 185 lb (83.9 kg)   SpO2 98%   BMI 25.09 kg/m   Estimated body mass index is 25.09 kg/m  as calculated from the following:    Height as of this encounter: 6' (1.829 m).    Weight as of this encounter: 185 lb (83.9 kg).. Body surface area is 2.06 meters squared. BP completed using cuff size: regular  No Pain (1)    Nursing Comments: Johnny to follow up with Primary Care provider regarding elevated blood pressure.    Oswestry Disability Index (YAMILE   Stephan Simmons 1980, All rights reserved. Used with permission)    Section 1 - Pain intensity: The pain is fairly severe at the moment.  Section 2 - Personal care (washing, dressing, etc.) : It is painful to look after myself and I am slow and careful.  Section 3 - Lifting: Pain prevents me from lifting heavy weights but I can manage light to medium weights if they are conveniently positioned.  Section 4 - Walking: Pain prevents me from walking more than a quarter of a mile.  Section 5 - Sitting: Pain prevents me from sitting for more than 1 hour.  Section 6 - Standing: Pain prevents me from standing for more than 10 minutes.  Section 7 - Sleeping: My sleep is occasionally interrupted by pain.  Section 8 - Sex life (if applicable): My sex life is nearly normal but is very painful.  Section 9 - Social life: My social life is normal but increases the degree of pain.  Section 10 - Traveling: Pain is bad but I am able to manage trips over two hours.  Sum: 22  Count: 10  Oswestry Score (%): 44 %       Tom Latham

## 2024-04-30 NOTE — PROGRESS NOTES
NEUROSURGERY CONSULTATION NOTE      Neurosurgery was asked to see this patient by Viktoriya Cohen CNP for evaluation of lumbar radiculopathy.       CONSULTATION ASSESSMENT AND PLAN:    34 yo male who presents with low back and right leg pain, numbness and weakness.  Lumbar x-ray shows no significant instability or subluxation.  MRI of his lumbar spine shows a right paracentral disc protrusion at lumbar 4-5 impinging the right L5 nerve root resulting in moderate overall spinal canal stenosis.  Significant foraminal narrowing at lumbar 5-sacral 1.  Recommend right lumbar 4-lumbar 5 hemilaminectomy, medial facetectomy, foraminotomy and microdiscectomy.  Risks and benefits of lumbar microdiscectomy discussed including but not limited to infection, hematoma, nerve damage including paralysis, post op radiculitis, durotomy, inadequate symptom relief, recurrent disc herniations, risks associated with the use of general anesthesia.     No further conservative care is indicated and the surgery is medically necessary for the following reasons: further physical therapy is not indicated in this case as it would only prolong the patient s suffering without providing any benefit particularly since he is noted to have worsening weakness overtime and the delay would increase the risk of neurologic decline and/or symptoms worsening unnecessarily, with no benefit to the patient and possible harm.  There are no untreated, underlying mental health conditions (including but not limited to psychological conditions or drug or alcohol abuse) that will preclude an appropriate recovery from this surgery or that are contraindications to proceeding with surgery.     Viviana Sorensen MD      HISTORY OF PRESENTING ILLNESS:    34 yo male who presents with low back and right leg pain. Symptoms present for last 6 months but worsening in the last month. Initially physical therapy helped more anterior lateral leg pain. Then worsened and  started going down posterolateral/posterior leg to his shin.  Physical therapy did not help this worsening pain. Laying down worsens pain. Reclining in a chair does help. Symptoms disrupting his sleep. Noted to have weakness on examination during physical therapy overtime. Also leg feels weak diffusely if really painful. He noted a band around his calf recently with numbness in his foot.  Denies bowel or bladder loss.     Right lumbar 4-5 and lumbar 5-sacral 1 TFESI - no long term relief  Gabapentin - TID. Lower pain days overall but limited mobility with increase sharp pain regularly.   Cyclobenzaprine,Naproxen, Toradol injection, Meloxicam, Methocarbamol- muscle relaxants helped muscle spasms.   Chiropractic care in the past without relief.     Past Medical History:   Diagnosis Date    Calculus of kidney 10/06    Thyrotoxicosis without mention of goiter or other cause, without mention of thyrotoxic crisis or storm        Past Surgical History:   Procedure Laterality Date    SURGICAL HISTORY OF -   10/06    cystoscopy/ureteral stone extraction - dr herndon       REVIEW OF SYSTEMS:  See ROS form under media     MEDICATIONS:    Current Outpatient Medications   Medication Sig Dispense Refill    gabapentin (NEURONTIN) 300 MG capsule Take 300mg po at bedtime for 3 days, then twice daily x 3 days, then three times daily. 90 capsule 0    methylPREDNISolone (MEDROL DOSEPAK) 4 MG tablet therapy pack Follow Package Directions 21 tablet 0    NO ACTIVE MEDICATIONS  0 0    predniSONE (DELTASONE) 10 MG tablet Prednisone 10 mg tablet, 3 tablets p.o. daily for 3 days, then 2 tablets p.o. daily for 3 days, then 1 tablet p.o. daily for 3 days then stop. 18 tablet 0    cyclobenzaprine (FLEXERIL) 10 MG tablet Take 0.5-1 tablets (5-10 mg) by mouth 3 times daily as needed for muscle spasms (Patient not taking: Reported on 4/29/2024) 15 tablet 0         ALLERGIES/SENSITIVITIES:     Allergies   Allergen Reactions    Compazine  [Prochlorperazine]      Dystonic jaw movements         PERTINENT SOCIAL HISTORY:   Social History     Socioeconomic History    Marital status: Single   Tobacco Use    Smoking status: Never     Passive exposure: Yes    Smokeless tobacco: Never    Tobacco comments:     Socially  1-2 per day   Substance and Sexual Activity    Alcohol use: No    Drug use: No         FAMILY HISTORY:  Family History   Problem Relation Age of Onset    Family History Negative Unknown         PHYSICAL EXAM:   Constitutional: BP (!) 130/91   Pulse 70   Ht 1.829 m (6')   Wt 83.9 kg (185 lb)   SpO2 98%   BMI 25.09 kg/m       Mental Status: A & O in no acute distress.  Affect is appropriate.  Speech is fluent.  Recent and remote memory are intact.  Attention span and concentration are normal.     Motor: Normal bulk and tone all muscle groups of  lower extremities.    Strength: 5/5 in LE except DF and EHL 4/5     Sensory: Sensation intact bilaterally to light touch in LE      Coordination:  Heel/toe/tandem gait intact.  Normal gait and station.     Reflexes:  knee/ ankle jerk intact -2+.    +SLR on right    IMAGING:  I personally reviewed all radiographic images         Cc:   No Ref-Primary, Physician

## 2024-04-30 NOTE — LETTER
4/30/2024         RE: Franco Raymond  [address]  [facility] [address]        Dear Colleague,    Thank you for referring your patient, Franco Raymond, to the [facility]. Please see a copy of my visit note below.    NEUROSURGERY CONSULTATION NOTE      Neurosurgery was asked to see this patient by Viktoriya Cohen CNP for evaluation of lumbar radiculopathy.       CONSULTATION ASSESSMENT AND PLAN:    34 yo male who presents with low back and right leg pain, numbness and weakness.  Lumbar x-ray shows no significant instability or subluxation.  MRI of his lumbar spine shows a right paracentral disc protrusion at lumbar 4-5 impinging the right L5 nerve root resulting in moderate overall spinal canal stenosis.  Significant foraminal narrowing at lumbar 5-sacral 1.  Recommend right lumbar 4-lumbar 5 hemilaminectomy, medial facetectomy, foraminotomy and microdiscectomy.  Risks and benefits of lumbar microdiscectomy discussed including but not limited to infection, hematoma, nerve damage including paralysis, post op radiculitis, durotomy, inadequate symptom relief, recurrent disc herniations, risks associated with the use of general anesthesia.     No further conservative care is indicated and the surgery is medically necessary for the following reasons: further physical therapy is not indicated in this case as it would only prolong the patient s suffering without providing any benefit particularly since he is noted to have worsening weakness overtime and the delay would increase the risk of neurologic decline and/or symptoms worsening unnecessarily, with no benefit to the patient and possible harm.  There are no untreated, underlying mental health conditions (including but not limited to psychological conditions or drug or alcohol abuse) that will preclude an appropriate recovery from this surgery or that are contraindications to proceeding with surgery.     Viviana Sorensen,  MD      HISTORY OF PRESENTING ILLNESS:    34 yo male who presents with low back and right leg pain. Symptoms present for last 6 months but worsening in the last month. Initially physical therapy helped more anterior lateral leg pain. Then worsened and started going down posterolateral/posterior leg to his shin.  Physical therapy did not help this worsening pain. Laying down worsens pain. Reclining in a chair does help. Symptoms disrupting his sleep. Noted to have weakness on examination during physical therapy overtime. Also leg feels weak diffusely if really painful. He noted a band around his calf recently with numbness in his foot.  Denies bowel or bladder loss.     Right lumbar 4-5 and lumbar 5-sacral 1 TFESI - no long term relief  Gabapentin - TID. Lower pain days overall but limited mobility with increase sharp pain regularly.   Cyclobenzaprine,Naproxen, Toradol injection, Meloxicam, Methocarbamol- muscle relaxants helped muscle spasms.   Chiropractic care in the past without relief.     Past Medical History:   Diagnosis Date     Calculus of kidney 10/06     Thyrotoxicosis without mention of goiter or other cause, without mention of thyrotoxic crisis or storm        Past Surgical History:   Procedure Laterality Date     SURGICAL HISTORY OF -   10/06    cystoscopy/ureteral stone extraction - dr herndon       REVIEW OF SYSTEMS:  See ROS form under media     MEDICATIONS:    Current Outpatient Medications   Medication Sig Dispense Refill     gabapentin (NEURONTIN) 300 MG capsule Take 300mg po at bedtime for 3 days, then twice daily x 3 days, then three times daily. 90 capsule 0     methylPREDNISolone (MEDROL DOSEPAK) 4 MG tablet therapy pack Follow Package Directions 21 tablet 0     NO ACTIVE MEDICATIONS  0 0     predniSONE (DELTASONE) 10 MG tablet Prednisone 10 mg tablet, 3 tablets p.o. daily for 3 days, then 2 tablets p.o. daily for 3 days, then 1 tablet p.o. daily for 3 days then stop. 18 tablet 0      cyclobenzaprine (FLEXERIL) 10 MG tablet Take 0.5-1 tablets (5-10 mg) by mouth 3 times daily as needed for muscle spasms (Patient not taking: Reported on 4/29/2024) 15 tablet 0         ALLERGIES/SENSITIVITIES:     Allergies   Allergen Reactions     Compazine [Prochlorperazine]      Dystonic jaw movements         PERTINENT SOCIAL HISTORY:   Social History     Socioeconomic History     Marital status: Single   Tobacco Use     Smoking status: Never     Passive exposure: Yes     Smokeless tobacco: Never     Tobacco comments:     Socially  1-2 per day   Substance and Sexual Activity     Alcohol use: No     Drug use: No         FAMILY HISTORY:  Family History   Problem Relation Age of Onset     Family History Negative Unknown         PHYSICAL EXAM:   Constitutional: BP (!) 130/91   Pulse 70   Ht 1.829 m (6')   Wt 83.9 kg (185 lb)   SpO2 98%   BMI 25.09 kg/m       Mental Status: A & O in no acute distress.  Affect is appropriate.  Speech is fluent.  Recent and remote memory are intact.  Attention span and concentration are normal.     Motor: Normal bulk and tone all muscle groups of  lower extremities.    Strength: 5/5 in LE except DF and EHL 4/5     Sensory: Sensation intact bilaterally to light touch in LE      Coordination:  Heel/toe/tandem gait intact.  Normal gait and station.     Reflexes:  knee/ ankle jerk intact -2+.    +SLR on right    IMAGING:  I personally reviewed all radiographic images         Cc:   No Ref-Primary, Physician             Again, thank you for allowing me to participate in the care of your patient.        Sincerely,        Viviana Sorensen MD

## 2024-04-30 NOTE — PATIENT INSTRUCTIONS
Recommend right lumbar 4-lumbar 5 hemilaminectomy, medial facetectomy, foraminotomy and microdiscectomy.    Arrange preop clearance visit with primary care as soon as possible. Needs to take place within 30 days of surgery.    Please review COMPLETE information about your proposed surgery, pre-operative requirements, post-operative course and expectations - available in a folder provided to you in clinic!    Your surgery scheduler will call you within 3 business days to begin the process of scheduling your surgery and appointments.     Pre-Operative    Pre-operative physical / Lab work with primary care physician within 30 days of surgical date. We prefer the pre-op exam to be done 2 weeks prior to surgery. We also prefer pre-op lab work be done at [facility] or [facility] outpatient lab, 2 weeks prior to surgery.     If all pre-op appointments/test are not completed prior to your surgery date, you will be asked to reschedule your surgery.           As part of preparation for your upcoming procedure your primary care doctor may order a test to rule out a COVID-19 infection. This is no longer a requirement prior to surgery.     Readiness Visits    Prior to surgery, you may have a telephone or in person readiness visit with our RN team to discuss your upcomming surgery, results of your pre-op physical, and lab work.   If you will require a collar/neck brace after surgery, you will be fitted for one at your readiness visit prior to surgery (scheduled by the surgery scheduler).     Shower procedure    Hibiclens shower: Use one packet the night before surgery and one packet the morning of surgery for a whole body shower. Avoid face, hair, and genitals.      Eating/Drinking    Stop all solid foods 8 hours before surgery.  Stop all clear liquids 2 hours prior to arrival time     Clear liquids include water, clear juice, black coffee, or clear tea without milk, Gatorade, clear soda.     Medications - please refer to  the pre-operative medication instructions sheet in your folder    Hold Aspirin, NSAIDs (Advil/Ibuprofen, Indocin, Naproxen,Nuprin,Relafen/Nabumetone, Diclofenac,Meloxicam, Aleve, Celebrex) and all vitamins and supplements x 7-10 days prior to surgical date  You can take Tylenol (Acetaminophen) for pain up until the date of your surgery   Do not exceed 3,000 mg per day   Any other medications prescribed, please discuss with your primary care provider at your pre-operative physical. Please discuss when/if it is safe for you to stop taking blood thinners with your primary care provider.   We will NOT provide pain medications prior to surgery. We will prescribe post-op pain medications for up to 6 weeks after surgery.       FMLA/Short-term disability    If you are currently employed, you will likely need to be off work for 4-6 weeks for post-op recovery and healing.  Please fax any FMLA/short term disability paperwork to [phone], mail it into the clinic, drop it off in person, or send via a [employer] message.   You may also call our clinic with the date in which you'd like to return to work, and we can provide a work letter to your employer  We will support Short-Term Disability up to 12 weeks, beginning the date of your surgery. We do NOT support Long-Term Disability/Social Security Benefits.     Pain Management after surgery    Dealing with pain    As your body heals, you might feel a stabbing, burning, or aching pain. You may also have some numbness.  Everyone feels pain differently, we may ask you to rate your pain using a pain scale. This will let us know how much pain you feel.   Keep in mind that medicine won't take away all of your pain. It helps to try other ways to relax and ease pain.     Things to help with pain    After surgery, we will give you medicine for your pain. These medications work well, but they can make you drowsy, itchy, or sick to your stomach, and constipated. Try to take narcotics with  food if they cause nausea.   For mild to moderate pain, you can take medication such as Tylenol or Ibuprofen. These can be used with narcotics and muscle relaxants. *If you have had a fusion: do NOT use NSAIDs for 6 months after surgery.   Do NOT drive while taking narcotic pain medication  Do NOT drink alcohol while using narcotic pain medication  You can utilize ice as needed (no longer than 20 minutes at one time) you may apply this over your covered incision  Utilize heat for muscle spasms, do not apply heat over your incision  If a muscle relaxer is prescribed, please do NOT take it at the same time as your narcotic pain medication. Take them at least 90 minutes apart.   You may also use pain cream/patches on sore muscles. Do NOT apply these on your incision. Patches may be cut in 1/2 if needed.     *After your surgery, if you will be staying in-patient, a nursing team will be monitoring you closely throughout your stay and communicate your health status to your surgeon and other providers.  You will be seen by Advanced Practice Providers (e.g., nurse practitioners, clinic nurse specialists, and physician assistants) who will check on you regularly to assess the status of your surgical recovery.     Incision Care    Look at your incision site every day. You  may need a mirror, or family member to help you.   Do not submerge your incision in water such as pools, hot tubs, baths for at least 6 weeks or until incision is healed  You may get your incision wet in the shower. Allow water and soap to run over incision, and gently pat dry.   Remove the dressing the day after you are discharged from the hospital. Keep the incision clean and dry at all times. This may require several bandage changes.   Contact us right away if you have:   Fever over 101 degrees farenheit  Green or yellow drainage (pus) from your incision or increased bloody drainage   Redness, swelling, or warmth at your surgery site   Notify the clinic  [phone].    Activity Restrictions    For the first 6 weeks, no lifting,pushing, or pulling > 5-10 pounds, no bending, twisting.  Use the stairs in moderation   Walking: Walking is the best way to start exercise after surgery. Take short frequent walks. You may gradually increase the distance as tolerated. If you feel pain, decrease your activity, but DO NOT stop walking. Walking will help you regain strength.  Avoid prolonged positioning for longer than 30 minutes (change positions frequently while awake)  No contact sports until after follow up visit  No high impact activities such as; running/jogging, snowmobile or 4 kang riding or any other recreational vehicles until deemed safe by your surgeon/care team.   Please call the clinic if you develop any of the following symptoms:  Swelling and/or warmth in one or both legs  Pain or tenderness in your leg, ankle, foot, or arm   Red or discolored/pale skin     Post-Op Follow Up Appointments    We will call you to schedule these appointments after your discharge from the hospital.   Incision assessment within 2 weeks with a Registered Nurse   6 week post-op with a Nurse Practitioner/Physician Assistant. Your surgeon will be available on this day.

## 2024-05-01 ENCOUNTER — TELEPHONE (OUTPATIENT)
Dept: NEUROSURGERY | Facility: CLINIC | Age: 34
End: 2024-05-01
Payer: COMMERCIAL

## 2024-05-01 NOTE — TELEPHONE ENCOUNTER
Patient is scheduled for surgery on 5/15/24. I gave the patient surgery details, and he verbalized understanding.

## 2024-05-08 ENCOUNTER — VIRTUAL VISIT (OUTPATIENT)
Dept: NEUROSURGERY | Facility: CLINIC | Age: 34
End: 2024-05-08
Payer: COMMERCIAL

## 2024-05-08 ENCOUNTER — OFFICE VISIT (OUTPATIENT)
Dept: FAMILY MEDICINE | Facility: CLINIC | Age: 34
End: 2024-05-08
Payer: COMMERCIAL

## 2024-05-08 VITALS
WEIGHT: 185 LBS | TEMPERATURE: 98.1 F | DIASTOLIC BLOOD PRESSURE: 62 MMHG | SYSTOLIC BLOOD PRESSURE: 124 MMHG | HEART RATE: 73 BPM | RESPIRATION RATE: 12 BRPM | OXYGEN SATURATION: 98 % | BODY MASS INDEX: 25.06 KG/M2 | HEIGHT: 72 IN

## 2024-05-08 DIAGNOSIS — M54.16 RIGHT LUMBAR RADICULOPATHY: Primary | ICD-10-CM

## 2024-05-08 DIAGNOSIS — M54.16 RIGHT LUMBAR RADICULOPATHY: ICD-10-CM

## 2024-05-08 DIAGNOSIS — Z01.818 PREOP GENERAL PHYSICAL EXAM: Primary | ICD-10-CM

## 2024-05-08 DIAGNOSIS — R74.8 ELEVATED LIVER ENZYMES: Primary | ICD-10-CM

## 2024-05-08 LAB
ALBUMIN SERPL BCG-MCNC: 4.6 G/DL (ref 3.5–5.2)
ALP SERPL-CCNC: 53 U/L (ref 40–150)
ALT SERPL W P-5'-P-CCNC: 155 U/L (ref 0–70)
ANION GAP SERPL CALCULATED.3IONS-SCNC: 10 MMOL/L (ref 7–15)
AST SERPL W P-5'-P-CCNC: 64 U/L (ref 0–45)
BILIRUB SERPL-MCNC: 0.5 MG/DL
BUN SERPL-MCNC: 17.4 MG/DL (ref 6–20)
CALCIUM SERPL-MCNC: 9.6 MG/DL (ref 8.6–10)
CHLORIDE SERPL-SCNC: 102 MMOL/L (ref 98–107)
CREAT SERPL-MCNC: 1.02 MG/DL (ref 0.67–1.17)
DEPRECATED HCO3 PLAS-SCNC: 29 MMOL/L (ref 22–29)
EGFRCR SERPLBLD CKD-EPI 2021: >90 ML/MIN/1.73M2
ERYTHROCYTE [DISTWIDTH] IN BLOOD BY AUTOMATED COUNT: 12.5 % (ref 10–15)
GLUCOSE SERPL-MCNC: 94 MG/DL (ref 70–99)
HCT VFR BLD AUTO: 42.5 % (ref 40–53)
HGB BLD-MCNC: 13.8 G/DL (ref 13.3–17.7)
MCH RBC QN AUTO: 30.1 PG (ref 26.5–33)
MCHC RBC AUTO-ENTMCNC: 32.5 G/DL (ref 31.5–36.5)
MCV RBC AUTO: 93 FL (ref 78–100)
PLATELET # BLD AUTO: 221 10E3/UL (ref 150–450)
POTASSIUM SERPL-SCNC: 4.7 MMOL/L (ref 3.4–5.3)
PROT SERPL-MCNC: 7.4 G/DL (ref 6.4–8.3)
RBC # BLD AUTO: 4.59 10E6/UL (ref 4.4–5.9)
SODIUM SERPL-SCNC: 141 MMOL/L (ref 135–145)
WBC # BLD AUTO: 4.4 10E3/UL (ref 4–11)

## 2024-05-08 PROCEDURE — 80053 COMPREHEN METABOLIC PANEL: CPT

## 2024-05-08 PROCEDURE — 36415 COLL VENOUS BLD VENIPUNCTURE: CPT

## 2024-05-08 PROCEDURE — 99207 PR NO CHARGE NURSE ONLY: CPT | Mod: 93

## 2024-05-08 PROCEDURE — 99214 OFFICE O/P EST MOD 30 MIN: CPT

## 2024-05-08 PROCEDURE — 85027 COMPLETE CBC AUTOMATED: CPT

## 2024-05-08 NOTE — PATIENT INSTRUCTIONS
Preparing for Your Surgery  Getting started  A nurse will call you to review your health history and instructions. They will give you an arrival time based on your scheduled surgery time. Please be ready to share:  Your doctor's clinic name and phone number  Your medical, surgical, and anesthesia history  A list of allergies and sensitivities  A list of medicines, including herbal treatments and over-the-counter drugs  Whether the patient has a legal guardian (ask how to send us the papers in advance)  Please tell us if you're pregnant--or if there's any chance you might be pregnant. Some surgeries may injure a fetus (unborn baby), so they require a pregnancy test. Surgeries that are safe for a fetus don't always need a test, and you can choose whether to have one.   If you have a child who's having surgery, please ask for a copy of Preparing for Your Child's Surgery.    Preparing for surgery  Within 10 to 30 days of surgery: Have a pre-op exam (sometimes called an H&P, or History and Physical). This can be done at a clinic or pre-operative center.  If you're having a , you may not need this exam. Talk to your care team.  At your pre-op exam, talk to your care team about all medicines you take. If you need to stop any medicines before surgery, ask when to start taking them again.  We do this for your safety. Many medicines can make you bleed too much during surgery. Some change how well surgery (anesthesia) drugs work.  Call your insurance company to let them know you're having surgery. (If you don't have insurance, call [phone].)  Call your clinic if there's any change in your health. This includes signs of a cold or flu (sore throat, runny nose, cough, rash, fever). It also includes a scrape or scratch near the surgery site.  If you have questions on the day of surgery, call your hospital or surgery center.  Eating and drinking guidelines  For your safety: Unless your surgeon tells you otherwise,  follow the guidelines below.  Eat and drink as usual until 8 hours before you arrive for surgery. After that, no food or milk.  Drink clear liquids until 2 hours before you arrive. These are liquids you can see through, like water, Gatorade, and Propel Water. They also include plain black coffee and tea (no cream or milk), candy, and breath mints. You can spit out gum when you arrive.  If you drink alcohol: Stop drinking it the night before surgery.  If your care team tells you to take medicine on the morning of surgery, it's okay to take it with a sip of water.  Preventing infection  Shower or bathe the night before and morning of your surgery. Follow the instructions your clinic gave you. (If no instructions, use regular soap.)  Don't shave or clip hair near your surgery site. We'll remove the hair if needed.  Don't smoke or vape the morning of surgery. You may chew nicotine gum up to 2 hours before surgery. A nicotine patch is okay.  Note: Some surgeries require you to completely quit smoking and nicotine. Check with your surgeon.  Your care team will make every effort to keep you safe from infection. We will:  Clean our hands often with soap and water (or an alcohol-based hand rub).  Clean the skin at your surgery site with a special soap that kills germs.  Give you a special gown to keep you warm. (Cold raises the risk of infection.)  Wear special hair covers, masks, gowns and gloves during surgery.  Give antibiotic medicine, if prescribed. Not all surgeries need antibiotics.  What to bring on the day of surgery  Photo ID and insurance card  Copy of your health care directive, if you have one  Glasses and hearing aids (bring cases)  You can't wear contacts during surgery  Inhaler and eye drops, if you use them (tell us about these when you arrive)  CPAP machine or breathing device, if you use them  A few personal items, if spending the night  If you have . . .  A pacemaker, ICD (cardiac defibrillator) or other  implant: Bring the ID card.  An implanted stimulator: Bring the remote control.  A legal guardian: Bring a copy of the certified (court-stamped) guardianship papers.  Please remove any jewelry, including body piercings. Leave jewelry and other valuables at home.  If you're going home the day of surgery  You must have a responsible adult drive you home. They should stay with you overnight as well.  If you don't have someone to stay with you, and you aren't safe to go home alone, we may keep you overnight. Insurance often won't pay for this.  After surgery  If it's hard to control your pain or you need more pain medicine, please call your surgeon's office.  Questions?   If you have any questions for your care team, list them here: _________________________________________________________________________________________________________________________________________________________________________ ____________________________________ ____________________________________ ____________________________________  For informational purposes only. Not to replace the advice of your health care provider. Copyright   2003, 2019 [employer]. All rights reserved. Clinically reviewed by Diana Bansal MD. [employer] 782728 - REV 12/22.    How to Take Your Medication Before Surgery  - STOP taking all vitamins and herbal supplements 14 days before surgery.

## 2024-05-08 NOTE — H&P (VIEW-ONLY)
Preoperative Evaluation  [facility]  [address] [place] [address]  Phone: [phone]  Fax: [phone]  Primary Provider: No Ref-Primary, Physician  Pre-op Performing Provider: MIKE EDMONDS  May 8, 2024       Johnny is a 33 year old, presenting for the following:  Pre-Op Exam (right lumbar 4-lumbar 5 hemilaminectomy, medial facetectomy, foraminotomy and microdiscectomy with 5/15/24 at [facility] )         No data to display              Surgical Information  Surgery/Procedure: right lumbar 4-lumbar 5 hemilaminectomy, medial facetectomy, foraminotomy and microdiscectomy   Surgery Location: [facility]   Surgeon: Dr. Kandis Sorensen  Surgery Date: 5/15/24  Time of Surgery: 10:00 Am   Where patient plans to recover: At home with family  Fax number for surgical facility: Note does not need to be faxed, will be available electronically in Epic.    Assessment & Plan     The proposed surgical procedure is considered INTERMEDIATE risk.    Preop general physical exam  Preoperative exam completed today. No chronic medical conditions or pertinent past medical history. Revised Cardiac Risk Index very low. Lab results pending. Reviewed medication hold times.   - CBC with platelets  - Comprehensive metabolic panel (BMP + Alb, Alk Phos, ALT, AST, Total. Bili, TP)    Right lumbar radiculopathy  Surgery planned for 5/15.        - No identified additional risk factors other than previously addressed    Antiplatelet or Anticoagulation Medication Instructions   - Patient is on no antiplatelet or anticoagulation medications.    Additional Medication Instructions   - pregabalin, gabapentin: Continue without modification.   - ibuprofen (Advil, Motrin): HOLD 1 day before surgery.    - Herbal medications and vitamins: HOLD 14 days prior to surgery.    Recommendation  APPROVAL GIVEN to proceed with proposed procedure, without further diagnostic evaluation.        Subjective      HPI related to upcoming procedure:  Initial injury/pain when jumping on trampoline with his daughter. Completed PT and injections with no improvement. Impacting ability to walk and he has been sleeping in recliner due to pain.          5/6/2024     9:45 AM   Preop Questions   1. Have you ever had a heart attack or stroke? No   2. Have you ever had surgery on your heart or blood vessels, such as a stent placement, a coronary artery bypass, or surgery on an artery in your head, neck, heart, or legs? No   3. Do you have chest pain with activity? No   4. Do you have a history of  heart failure? No   5. Do you currently have a cold, bronchitis or symptoms of other infection? No   6. Do you have a cough, shortness of breath, or wheezing? No   7. Do you or anyone in your family have previous history of blood clots? No   8. Do you or does anyone in your family have a serious bleeding problem such as prolonged bleeding following surgeries or cuts? No   9. Have you ever had problems with anemia or been told to take iron pills? No   10. Have you had any abnormal blood loss such as black, tarry or bloody stools? No   11. Have you ever had a blood transfusion? No   12. Are you willing to have a blood transfusion if it is medically needed before, during, or after your surgery? Yes   13. Have you or any of your relatives ever had problems with anesthesia? No   14. Do you have sleep apnea, excessive snoring or daytime drowsiness? No   15. Do you have any artifical heart valves or other implanted medical devices like a pacemaker, defibrillator, or continuous glucose monitor? No   16. Do you have artificial joints? No   17. Are you allergic to latex? No       Health Care Directive  Patient does not have a Health Care Directive or Living Will: Patient states has Advance Directive and will bring in a copy to clinic.    Preoperative Review of    reviewed - controlled substances reflected in medication list.      Patient Active  Problem List    Diagnosis Date Noted    Right lumbar radiculopathy 04/26/2024     Priority: Medium    Acute right-sided low back pain with right-sided sciatica 02/05/2024     Priority: Medium    Thyrotoxicosis      Priority: Medium     Problem list name updated by automated process. Provider to review      Calculus of kidney      Priority: Medium      Past Medical History:   Diagnosis Date    Calculus of kidney 10/06    Thyrotoxicosis without mention of goiter or other cause, without mention of thyrotoxic crisis or storm      Past Surgical History:   Procedure Laterality Date    SURGICAL HISTORY OF -   10/06    cystoscopy/ureteral stone extraction - dr herndon     Current Outpatient Medications   Medication Sig Dispense Refill    gabapentin (NEURONTIN) 300 MG capsule Take 300mg po at bedtime for 3 days, then twice daily x 3 days, then three times daily. 90 capsule 0    NO ACTIVE MEDICATIONS  0 0       Allergies   Allergen Reactions    Compazine [Prochlorperazine]      Dystonic jaw movements          Social History     Tobacco Use    Smoking status: Never     Passive exposure: Yes    Smokeless tobacco: Never    Tobacco comments:     Socially  1-2 per day   Substance Use Topics    Alcohol use: No     Family History   Problem Relation Age of Onset    Family History Negative Unknown      History   Drug Use No         Review of Systems    Review of Systems  CONSTITUTIONAL: NEGATIVE for fever, chills, change in weight  INTEGUMENTARY/SKIN: NEGATIVE for worrisome rashes, moles or lesions  EYES: NEGATIVE for vision changes or irritation  ENT/MOUTH: NEGATIVE for ear, mouth and throat problems  RESP: NEGATIVE for significant cough or SOB  CV: NEGATIVE for chest pain, palpitations or peripheral edema  GI: NEGATIVE for nausea, abdominal pain, heartburn, or change in bowel habits  : NEGATIVE for frequency, dysuria, or hematuria  MUSCULOSKELETAL:back pain  NEURO: NEGATIVE for weakness, dizziness or paresthesias  ENDOCRINE:  NEGATIVE for temperature intolerance, skin/hair changes  HEME: NEGATIVE for bleeding problems  PSYCHIATRIC: NEGATIVE for changes in mood or affect    Objective    /62 (BP Location: Left arm, Patient Position: Sitting, Cuff Size: Adult Regular)   Pulse 73   Temp 98.1  F (36.7  C) (Tympanic)   Resp 12   Ht 1.829 m (6')   Wt 83.9 kg (185 lb)   SpO2 98%   BMI 25.09 kg/m     Estimated body mass index is 25.09 kg/m  as calculated from the following:    Height as of this encounter: 1.829 m (6').    Weight as of this encounter: 83.9 kg (185 lb).    Physical Exam  GENERAL: alert and no distress  EYES: Eyes grossly normal to inspection, PERRL and conjunctivae and sclerae normal  HENT: ear canals and TM's normal, nose and mouth without ulcers or lesions  NECK: no adenopathy, no asymmetry, masses, or scars  RESP: lungs clear to auscultation - no rales, rhonchi or wheezes  CV: regular rate and rhythm, normal S1 S2, no S3 or S4, no murmur, click or rub, no peripheral edema  ABDOMEN: soft, nontender, no hepatosplenomegaly, no masses and bowel sounds normal  MS: no gross musculoskeletal defects noted, no edema  SKIN: no suspicious lesions or rashes  NEURO: Normal strength and tone, mentation intact and speech normal  PSYCH: mentation appears normal, affect normal/bright    Recent Labs   Lab Test 01/25/23  0805   CR 1.00      Diagnostics  Labs pending at this time.  Results will be reviewed when available.   No EKG required, no history of coronary heart disease, significant arrhythmia, peripheral arterial disease or other structural heart disease.    Revised Cardiac Risk Index (RCRI)  The patient has the following serious cardiovascular risks for perioperative complications:   - No serious cardiac risks = 0 points     RCRI Interpretation: 0 points: Class I (very low risk - 0.4% complication rate)       Signed Electronically by: NIXON Solorzano CNP    Copy of this evaluation report is provided to requesting  physician.

## 2024-05-08 NOTE — PROGRESS NOTES
Called patient, discussed surgery, post-op course, expectations, follow up plan.    Reviewed H&P from 05/08/2024 - cleared for surgery  Labs - WNL    MRI done on 02/05/2024 - in Nil    To OR as planned.     Check in - 1000    Nothing to eat or drink after midnight the night before surgery.     Reviewed with patient: Arrive 2 hours prior to scheduled surgery.    Continue to refrain from NSAIDS (Ibuprofen, Aleve, Naprosyn), ASA, Over the counter herbal medications or supplements, anti-coagulants and blood thinners.     Patient confirmed they have help/assistance in place at home upon discharge    Instructions: using a washcloth and a bottle of provided Hibiclens, wash your body, avoiding your face and genitals. Preferably, shower the night before surgery and the morning of surgery using a half a bottle each time for your whole body shower.        Patient was informed that we will provide up to 6 weeks prescription of pain medication for post-operative pain.      Instructed patient about the following: After your surgery, if you will be staying in-patient, a nursing provider team will be monitoring you closely throughout your stay and communicate your health status to your surgeon and other providers.  You will be seen by Advanced Practice Providers (e.g., nurse practitioners, clinic nurse specialist, and physician assistants) who will check on you regularly to assess the status of your surgery.     Kiera Witt RN, CNRN

## 2024-05-08 NOTE — PROGRESS NOTES
Preoperative Evaluation  [facility]  [address] [place] [address]  Phone: [phone]  Fax: [phone]  Primary Provider: No Ref-Primary, Physician  Pre-op Performing Provider: MIKE EDMONDS  May 8, 2024       Johnny is a 33 year old, presenting for the following:  Pre-Op Exam (right lumbar 4-lumbar 5 hemilaminectomy, medial facetectomy, foraminotomy and microdiscectomy with 5/15/24 at [facility] )         No data to display              Surgical Information  Surgery/Procedure: right lumbar 4-lumbar 5 hemilaminectomy, medial facetectomy, foraminotomy and microdiscectomy   Surgery Location: [facility]   Surgeon: Dr. Kandis Sorensen  Surgery Date: 5/15/24  Time of Surgery: 10:00 Am   Where patient plans to recover: At home with family  Fax number for surgical facility: Note does not need to be faxed, will be available electronically in Epic.    Assessment & Plan     The proposed surgical procedure is considered INTERMEDIATE risk.    Preop general physical exam  Preoperative exam completed today. No chronic medical conditions or pertinent past medical history. Revised Cardiac Risk Index very low. Lab results pending. Reviewed medication hold times.   - CBC with platelets  - Comprehensive metabolic panel (BMP + Alb, Alk Phos, ALT, AST, Total. Bili, TP)    Right lumbar radiculopathy  Surgery planned for 5/15.        - No identified additional risk factors other than previously addressed    Antiplatelet or Anticoagulation Medication Instructions   - Patient is on no antiplatelet or anticoagulation medications.    Additional Medication Instructions   - pregabalin, gabapentin: Continue without modification.   - ibuprofen (Advil, Motrin): HOLD 1 day before surgery.    - Herbal medications and vitamins: HOLD 14 days prior to surgery.    Recommendation  APPROVAL GIVEN to proceed with proposed procedure, without further diagnostic evaluation.        Subjective      HPI related to upcoming procedure:  Initial injury/pain when jumping on trampoline with his daughter. Completed PT and injections with no improvement. Impacting ability to walk and he has been sleeping in recliner due to pain.          5/6/2024     9:45 AM   Preop Questions   1. Have you ever had a heart attack or stroke? No   2. Have you ever had surgery on your heart or blood vessels, such as a stent placement, a coronary artery bypass, or surgery on an artery in your head, neck, heart, or legs? No   3. Do you have chest pain with activity? No   4. Do you have a history of  heart failure? No   5. Do you currently have a cold, bronchitis or symptoms of other infection? No   6. Do you have a cough, shortness of breath, or wheezing? No   7. Do you or anyone in your family have previous history of blood clots? No   8. Do you or does anyone in your family have a serious bleeding problem such as prolonged bleeding following surgeries or cuts? No   9. Have you ever had problems with anemia or been told to take iron pills? No   10. Have you had any abnormal blood loss such as black, tarry or bloody stools? No   11. Have you ever had a blood transfusion? No   12. Are you willing to have a blood transfusion if it is medically needed before, during, or after your surgery? Yes   13. Have you or any of your relatives ever had problems with anesthesia? No   14. Do you have sleep apnea, excessive snoring or daytime drowsiness? No   15. Do you have any artifical heart valves or other implanted medical devices like a pacemaker, defibrillator, or continuous glucose monitor? No   16. Do you have artificial joints? No   17. Are you allergic to latex? No       Health Care Directive  Patient does not have a Health Care Directive or Living Will: Patient states has Advance Directive and will bring in a copy to clinic.    Preoperative Review of    reviewed - controlled substances reflected in medication list.      Patient Active  Problem List    Diagnosis Date Noted    Right lumbar radiculopathy 04/26/2024     Priority: Medium    Acute right-sided low back pain with right-sided sciatica 02/05/2024     Priority: Medium    Thyrotoxicosis      Priority: Medium     Problem list name updated by automated process. Provider to review      Calculus of kidney      Priority: Medium      Past Medical History:   Diagnosis Date    Calculus of kidney 10/06    Thyrotoxicosis without mention of goiter or other cause, without mention of thyrotoxic crisis or storm      Past Surgical History:   Procedure Laterality Date    SURGICAL HISTORY OF -   10/06    cystoscopy/ureteral stone extraction - dr herndon     Current Outpatient Medications   Medication Sig Dispense Refill    gabapentin (NEURONTIN) 300 MG capsule Take 300mg po at bedtime for 3 days, then twice daily x 3 days, then three times daily. 90 capsule 0    NO ACTIVE MEDICATIONS  0 0       Allergies   Allergen Reactions    Compazine [Prochlorperazine]      Dystonic jaw movements          Social History     Tobacco Use    Smoking status: Never     Passive exposure: Yes    Smokeless tobacco: Never    Tobacco comments:     Socially  1-2 per day   Substance Use Topics    Alcohol use: No     Family History   Problem Relation Age of Onset    Family History Negative Unknown      History   Drug Use No         Review of Systems    Review of Systems  CONSTITUTIONAL: NEGATIVE for fever, chills, change in weight  INTEGUMENTARY/SKIN: NEGATIVE for worrisome rashes, moles or lesions  EYES: NEGATIVE for vision changes or irritation  ENT/MOUTH: NEGATIVE for ear, mouth and throat problems  RESP: NEGATIVE for significant cough or SOB  CV: NEGATIVE for chest pain, palpitations or peripheral edema  GI: NEGATIVE for nausea, abdominal pain, heartburn, or change in bowel habits  : NEGATIVE for frequency, dysuria, or hematuria  MUSCULOSKELETAL:back pain  NEURO: NEGATIVE for weakness, dizziness or paresthesias  ENDOCRINE:  NEGATIVE for temperature intolerance, skin/hair changes  HEME: NEGATIVE for bleeding problems  PSYCHIATRIC: NEGATIVE for changes in mood or affect    Objective    /62 (BP Location: Left arm, Patient Position: Sitting, Cuff Size: Adult Regular)   Pulse 73   Temp 98.1  F (36.7  C) (Tympanic)   Resp 12   Ht 1.829 m (6')   Wt 83.9 kg (185 lb)   SpO2 98%   BMI 25.09 kg/m     Estimated body mass index is 25.09 kg/m  as calculated from the following:    Height as of this encounter: 1.829 m (6').    Weight as of this encounter: 83.9 kg (185 lb).    Physical Exam  GENERAL: alert and no distress  EYES: Eyes grossly normal to inspection, PERRL and conjunctivae and sclerae normal  HENT: ear canals and TM's normal, nose and mouth without ulcers or lesions  NECK: no adenopathy, no asymmetry, masses, or scars  RESP: lungs clear to auscultation - no rales, rhonchi or wheezes  CV: regular rate and rhythm, normal S1 S2, no S3 or S4, no murmur, click or rub, no peripheral edema  ABDOMEN: soft, nontender, no hepatosplenomegaly, no masses and bowel sounds normal  MS: no gross musculoskeletal defects noted, no edema  SKIN: no suspicious lesions or rashes  NEURO: Normal strength and tone, mentation intact and speech normal  PSYCH: mentation appears normal, affect normal/bright    Recent Labs   Lab Test 01/25/23  0805   CR 1.00      Diagnostics  Labs pending at this time.  Results will be reviewed when available.   No EKG required, no history of coronary heart disease, significant arrhythmia, peripheral arterial disease or other structural heart disease.    Revised Cardiac Risk Index (RCRI)  The patient has the following serious cardiovascular risks for perioperative complications:   - No serious cardiac risks = 0 points     RCRI Interpretation: 0 points: Class I (very low risk - 0.4% complication rate)       Signed Electronically by: NIXON Solorzano CNP    Copy of this evaluation report is provided to requesting  physician.

## 2024-05-10 NOTE — TELEPHONE ENCOUNTER
Patient is rescheduled for surgery on 5/20/24. I gave the patient surgery details, and he verbalized understanding.

## 2024-05-13 ENCOUNTER — VIRTUAL VISIT (OUTPATIENT)
Dept: NEUROSURGERY | Facility: CLINIC | Age: 34
End: 2024-05-13
Payer: COMMERCIAL

## 2024-05-13 DIAGNOSIS — M54.16 RIGHT LUMBAR RADICULOPATHY: Primary | ICD-10-CM

## 2024-05-13 PROCEDURE — 99207 PR NO CHARGE NURSE ONLY: CPT | Mod: 93

## 2024-05-13 NOTE — PROGRESS NOTES
Called patient, discussed surgery, post-op course, expectations, follow up plan.    Reviewed H&P from 5/8/24 - approval given  Labs, EKG - within acceptable limits    MRI done on 2/5/24 - in Nil    To OR as planned.     Surgery date: 5/20/24    Surgery start time: 2:55 PM    Check in - 12:55 PM    Nothing to eat or drink after midnight the night before surgery.     Reviewed with patient: Arrive 2 hours prior to scheduled surgery.    Continue to refrain from NSAIDS (Ibuprofen, Aleve, Naprosyn), ASA, Over the counter herbal medications or supplements, anti-coagulants and blood thinners.     Patient confirmed they have help/assistance in place at home upon discharge    Instructions: using a washcloth and a bottle of provided Hibiclens, wash your body, avoiding your face and genitals. Preferably, shower the night before surgery and the morning of surgery using a half a bottle each time for your whole body shower.    Patient was informed that we will provide up to 6 weeks prescription of pain medication for post-operative pain.    Instructed patient about the following: After your surgery, if you will be staying in-patient, a nursing provider team will be monitoring you closely throughout your stay and communicate your health status to your surgeon and other providers.  You will be seen by Advanced Practice Providers (e.g., nurse practitioners, clinic nurse specialist, and physician assistants) who will check on you regularly to assess the status of your surgery.

## 2024-05-17 ENCOUNTER — TELEPHONE (OUTPATIENT)
Dept: NEUROSURGERY | Facility: CLINIC | Age: 34
End: 2024-05-17
Payer: COMMERCIAL

## 2024-05-17 NOTE — TELEPHONE ENCOUNTER
Called to inform Johnny about his arrival time at [facility] at 9:00 AM on 5/20/24. He verbalized agreement.  Kiera Witt RN, CNRN

## 2024-05-19 ENCOUNTER — ANESTHESIA EVENT (OUTPATIENT)
Dept: SURGERY | Facility: HOSPITAL | Age: 34
End: 2024-05-19
Payer: COMMERCIAL

## 2024-05-20 ENCOUNTER — ANESTHESIA (OUTPATIENT)
Dept: SURGERY | Facility: HOSPITAL | Age: 34
End: 2024-05-20
Payer: COMMERCIAL

## 2024-05-20 ENCOUNTER — APPOINTMENT (OUTPATIENT)
Dept: RADIOLOGY | Facility: HOSPITAL | Age: 34
End: 2024-05-20
Attending: PHYSICIAN ASSISTANT
Payer: COMMERCIAL

## 2024-05-20 ENCOUNTER — HOSPITAL ENCOUNTER (OUTPATIENT)
Facility: HOSPITAL | Age: 34
Discharge: HOME | End: 2024-05-20
Attending: SURGERY | Admitting: SURGERY
Payer: COMMERCIAL

## 2024-05-20 VITALS
DIASTOLIC BLOOD PRESSURE: 87 MMHG | SYSTOLIC BLOOD PRESSURE: 131 MMHG | TEMPERATURE: 97.3 F | HEART RATE: 51 BPM | WEIGHT: 183.1 LBS | OXYGEN SATURATION: 100 % | BODY MASS INDEX: 24.83 KG/M2 | RESPIRATION RATE: 12 BRPM

## 2024-05-20 DIAGNOSIS — M54.16 RIGHT LUMBAR RADICULOPATHY: Primary | ICD-10-CM

## 2024-05-20 PROCEDURE — 710N000009 HC RECOVERY PHASE 1, LEVEL 1, PER MIN: Performed by: SURGERY

## 2024-05-20 PROCEDURE — 250N000011 HC RX IP 250 OP 636

## 2024-05-20 PROCEDURE — 999N000141 HC STATISTIC PRE-PROCEDURE NURSING ASSESSMENT: Performed by: SURGERY

## 2024-05-20 PROCEDURE — 63030 LAMOT DCMPRN NRV RT 1 LMBR: CPT | Mod: AS | Performed by: PHYSICIAN ASSISTANT

## 2024-05-20 PROCEDURE — 360N000077 HC SURGERY LEVEL 4, PER MIN: Performed by: SURGERY

## 2024-05-20 PROCEDURE — 258N000003 HC RX IP 258 OP 636: Performed by: ANESTHESIOLOGY

## 2024-05-20 PROCEDURE — 250N000025 HC SEVOFLURANE, PER MIN: Performed by: SURGERY

## 2024-05-20 PROCEDURE — 63030 LAMOT DCMPRN NRV RT 1 LMBR: CPT | Mod: RT | Performed by: SURGERY

## 2024-05-20 PROCEDURE — 250N000009 HC RX 250: Performed by: SURGERY

## 2024-05-20 PROCEDURE — 999N000063 XR LUMBAR SPINE PORT 1 VIEW

## 2024-05-20 PROCEDURE — 272N000001 HC OR GENERAL SUPPLY STERILE: Performed by: SURGERY

## 2024-05-20 PROCEDURE — 250N000013 HC RX MED GY IP 250 OP 250 PS 637: Performed by: ANESTHESIOLOGY

## 2024-05-20 PROCEDURE — 250N000011 HC RX IP 250 OP 636: Performed by: PHYSICIAN ASSISTANT

## 2024-05-20 PROCEDURE — 250N000009 HC RX 250: Performed by: PHYSICIAN ASSISTANT

## 2024-05-20 PROCEDURE — 250N000009 HC RX 250

## 2024-05-20 PROCEDURE — 250N000013 HC RX MED GY IP 250 OP 250 PS 637: Performed by: PHYSICIAN ASSISTANT

## 2024-05-20 PROCEDURE — 250N000011 HC RX IP 250 OP 636: Performed by: ANESTHESIOLOGY

## 2024-05-20 PROCEDURE — 370N000017 HC ANESTHESIA TECHNICAL FEE, PER MIN: Performed by: SURGERY

## 2024-05-20 PROCEDURE — 272N000002 HC OR SUPPLY OTHER OPNP: Performed by: SURGERY

## 2024-05-20 PROCEDURE — 710N000012 HC RECOVERY PHASE 2, PER MINUTE: Performed by: SURGERY

## 2024-05-20 RX ORDER — OXYCODONE HYDROCHLORIDE 5 MG/1
5 TABLET ORAL EVERY 4 HOURS PRN
Status: DISCONTINUED | OUTPATIENT
Start: 2024-05-20 | End: 2024-05-20 | Stop reason: HOSPADM

## 2024-05-20 RX ORDER — ONDANSETRON 4 MG/1
4 TABLET, ORALLY DISINTEGRATING ORAL EVERY 30 MIN PRN
Status: DISCONTINUED | OUTPATIENT
Start: 2024-05-20 | End: 2024-05-20 | Stop reason: HOSPADM

## 2024-05-20 RX ORDER — FENTANYL CITRATE 50 UG/ML
50 INJECTION, SOLUTION INTRAMUSCULAR; INTRAVENOUS
Status: DISCONTINUED | OUTPATIENT
Start: 2024-05-20 | End: 2024-05-20 | Stop reason: HOSPADM

## 2024-05-20 RX ORDER — CEFAZOLIN SODIUM/WATER 2 G/20 ML
2 SYRINGE (ML) INTRAVENOUS SEE ADMIN INSTRUCTIONS
Status: DISCONTINUED | OUTPATIENT
Start: 2024-05-20 | End: 2024-05-20 | Stop reason: HOSPADM

## 2024-05-20 RX ORDER — FENTANYL CITRATE 50 UG/ML
50 INJECTION, SOLUTION INTRAMUSCULAR; INTRAVENOUS EVERY 5 MIN PRN
Status: DISCONTINUED | OUTPATIENT
Start: 2024-05-20 | End: 2024-05-20 | Stop reason: HOSPADM

## 2024-05-20 RX ORDER — ACETAMINOPHEN 325 MG/1
975 TABLET ORAL ONCE
Status: DISCONTINUED | OUTPATIENT
Start: 2024-05-20 | End: 2024-05-20

## 2024-05-20 RX ORDER — ACETAMINOPHEN 325 MG/1
975 TABLET ORAL EVERY 8 HOURS
Status: DISCONTINUED | OUTPATIENT
Start: 2024-05-20 | End: 2024-05-20 | Stop reason: HOSPADM

## 2024-05-20 RX ORDER — OXYCODONE HYDROCHLORIDE 5 MG/1
10 TABLET ORAL EVERY 4 HOURS PRN
Status: DISCONTINUED | OUTPATIENT
Start: 2024-05-20 | End: 2024-05-20 | Stop reason: HOSPADM

## 2024-05-20 RX ORDER — ACETAMINOPHEN 325 MG/1
975 TABLET ORAL ONCE
Status: DISCONTINUED | OUTPATIENT
Start: 2024-05-20 | End: 2024-05-20 | Stop reason: HOSPADM

## 2024-05-20 RX ORDER — ONDANSETRON 2 MG/ML
4 INJECTION INTRAMUSCULAR; INTRAVENOUS EVERY 30 MIN PRN
Status: DISCONTINUED | OUTPATIENT
Start: 2024-05-20 | End: 2024-05-20 | Stop reason: HOSPADM

## 2024-05-20 RX ORDER — DEXAMETHASONE SODIUM PHOSPHATE 10 MG/ML
4 INJECTION, SOLUTION INTRAMUSCULAR; INTRAVENOUS
Status: DISCONTINUED | OUTPATIENT
Start: 2024-05-20 | End: 2024-05-20 | Stop reason: HOSPADM

## 2024-05-20 RX ORDER — ONDANSETRON 2 MG/ML
4 INJECTION INTRAMUSCULAR; INTRAVENOUS EVERY 6 HOURS PRN
Status: DISCONTINUED | OUTPATIENT
Start: 2024-05-20 | End: 2024-05-20 | Stop reason: HOSPADM

## 2024-05-20 RX ORDER — OXYCODONE HYDROCHLORIDE 5 MG/1
5 TABLET ORAL EVERY 4 HOURS PRN
Qty: 42 TABLET | Refills: 0 | Status: SHIPPED | OUTPATIENT
Start: 2024-05-20 | End: 2024-07-09

## 2024-05-20 RX ORDER — KETOROLAC TROMETHAMINE 30 MG/ML
INJECTION, SOLUTION INTRAMUSCULAR; INTRAVENOUS PRN
Status: DISCONTINUED | OUTPATIENT
Start: 2024-05-20 | End: 2024-05-20

## 2024-05-20 RX ORDER — OXYCODONE HCL 5 MG/5 ML
10 SOLUTION, ORAL ORAL
Status: DISCONTINUED | OUTPATIENT
Start: 2024-05-20 | End: 2024-05-20 | Stop reason: HOSPADM

## 2024-05-20 RX ORDER — MAGNESIUM SULFATE 4 G/50ML
4 INJECTION INTRAVENOUS ONCE
Status: COMPLETED | OUTPATIENT
Start: 2024-05-20 | End: 2024-05-20

## 2024-05-20 RX ORDER — OXYCODONE HCL 5 MG/5 ML
5 SOLUTION, ORAL ORAL
Status: DISCONTINUED | OUTPATIENT
Start: 2024-05-20 | End: 2024-05-20 | Stop reason: HOSPADM

## 2024-05-20 RX ORDER — GABAPENTIN 300 MG/1
300 CAPSULE ORAL
Status: COMPLETED | OUTPATIENT
Start: 2024-05-20 | End: 2024-05-20

## 2024-05-20 RX ORDER — DEXAMETHASONE SODIUM PHOSPHATE 10 MG/ML
INJECTION, SOLUTION INTRAMUSCULAR; INTRAVENOUS PRN
Status: DISCONTINUED | OUTPATIENT
Start: 2024-05-20 | End: 2024-05-20

## 2024-05-20 RX ORDER — FENTANYL CITRATE 50 UG/ML
INJECTION, SOLUTION INTRAMUSCULAR; INTRAVENOUS PRN
Status: DISCONTINUED | OUTPATIENT
Start: 2024-05-20 | End: 2024-05-20

## 2024-05-20 RX ORDER — MAGNESIUM SULFATE HEPTAHYDRATE 40 MG/ML
2 INJECTION, SOLUTION INTRAVENOUS ONCE
Status: DISCONTINUED | OUTPATIENT
Start: 2024-05-20 | End: 2024-05-20

## 2024-05-20 RX ORDER — SODIUM CHLORIDE, SODIUM LACTATE, POTASSIUM CHLORIDE, CALCIUM CHLORIDE 600; 310; 30; 20 MG/100ML; MG/100ML; MG/100ML; MG/100ML
INJECTION, SOLUTION INTRAVENOUS CONTINUOUS
Status: DISCONTINUED | OUTPATIENT
Start: 2024-05-20 | End: 2024-05-20 | Stop reason: HOSPADM

## 2024-05-20 RX ORDER — FENTANYL CITRATE 50 UG/ML
25 INJECTION, SOLUTION INTRAMUSCULAR; INTRAVENOUS EVERY 5 MIN PRN
Status: DISCONTINUED | OUTPATIENT
Start: 2024-05-20 | End: 2024-05-20 | Stop reason: HOSPADM

## 2024-05-20 RX ORDER — ACETAMINOPHEN 500 MG
500-1000 TABLET ORAL EVERY 6 HOURS PRN
COMMUNITY
End: 2024-07-09

## 2024-05-20 RX ORDER — PROPOFOL 10 MG/ML
INJECTION, EMULSION INTRAVENOUS PRN
Status: DISCONTINUED | OUTPATIENT
Start: 2024-05-20 | End: 2024-05-20

## 2024-05-20 RX ORDER — BUPIVACAINE HYDROCHLORIDE AND EPINEPHRINE 2.5; 5 MG/ML; UG/ML
INJECTION, SOLUTION EPIDURAL; INFILTRATION; INTRACAUDAL; PERINEURAL PRN
Status: DISCONTINUED | OUTPATIENT
Start: 2024-05-20 | End: 2024-05-20 | Stop reason: HOSPADM

## 2024-05-20 RX ORDER — ONDANSETRON 2 MG/ML
INJECTION INTRAMUSCULAR; INTRAVENOUS PRN
Status: DISCONTINUED | OUTPATIENT
Start: 2024-05-20 | End: 2024-05-20

## 2024-05-20 RX ORDER — KETAMINE HYDROCHLORIDE 10 MG/ML
INJECTION INTRAMUSCULAR; INTRAVENOUS PRN
Status: DISCONTINUED | OUTPATIENT
Start: 2024-05-20 | End: 2024-05-20

## 2024-05-20 RX ORDER — NALOXONE HYDROCHLORIDE 1 MG/ML
0.1 INJECTION INTRAMUSCULAR; INTRAVENOUS; SUBCUTANEOUS
Status: DISCONTINUED | OUTPATIENT
Start: 2024-05-20 | End: 2024-05-20 | Stop reason: HOSPADM

## 2024-05-20 RX ORDER — LIDOCAINE 40 MG/G
CREAM TOPICAL
Status: DISCONTINUED | OUTPATIENT
Start: 2024-05-20 | End: 2024-05-20 | Stop reason: HOSPADM

## 2024-05-20 RX ORDER — ACETAMINOPHEN 325 MG/1
650 TABLET ORAL EVERY 4 HOURS PRN
Status: DISCONTINUED | OUTPATIENT
Start: 2024-05-23 | End: 2024-05-20 | Stop reason: HOSPADM

## 2024-05-20 RX ORDER — BUPIVACAINE HYDROCHLORIDE AND EPINEPHRINE 2.5; 5 MG/ML; UG/ML
2 INJECTION, SOLUTION EPIDURAL; INFILTRATION; INTRACAUDAL; PERINEURAL ONCE
Status: DISCONTINUED | OUTPATIENT
Start: 2024-05-20 | End: 2024-05-20 | Stop reason: HOSPADM

## 2024-05-20 RX ORDER — GLYCOPYRROLATE 0.2 MG/ML
INJECTION, SOLUTION INTRAMUSCULAR; INTRAVENOUS PRN
Status: DISCONTINUED | OUTPATIENT
Start: 2024-05-20 | End: 2024-05-20

## 2024-05-20 RX ORDER — ONDANSETRON 4 MG/1
4 TABLET, ORALLY DISINTEGRATING ORAL EVERY 6 HOURS PRN
Status: DISCONTINUED | OUTPATIENT
Start: 2024-05-20 | End: 2024-05-20 | Stop reason: HOSPADM

## 2024-05-20 RX ORDER — DEXAMETHASONE SODIUM PHOSPHATE 4 MG/ML
4 INJECTION, SOLUTION INTRA-ARTICULAR; INTRALESIONAL; INTRAMUSCULAR; INTRAVENOUS; SOFT TISSUE
Status: DISCONTINUED | OUTPATIENT
Start: 2024-05-20 | End: 2024-05-20 | Stop reason: HOSPADM

## 2024-05-20 RX ORDER — PROCHLORPERAZINE MALEATE 10 MG
10 TABLET ORAL EVERY 6 HOURS PRN
Status: DISCONTINUED | OUTPATIENT
Start: 2024-05-20 | End: 2024-05-20 | Stop reason: HOSPADM

## 2024-05-20 RX ORDER — METHOCARBAMOL 500 MG/1
500 TABLET, FILM COATED ORAL EVERY 6 HOURS PRN
Status: DISCONTINUED | OUTPATIENT
Start: 2024-05-20 | End: 2024-05-20 | Stop reason: HOSPADM

## 2024-05-20 RX ORDER — NALOXONE HYDROCHLORIDE 0.4 MG/ML
0.1 INJECTION, SOLUTION INTRAMUSCULAR; INTRAVENOUS; SUBCUTANEOUS
Status: DISCONTINUED | OUTPATIENT
Start: 2024-05-20 | End: 2024-05-20 | Stop reason: HOSPADM

## 2024-05-20 RX ORDER — CEFAZOLIN SODIUM/WATER 2 G/20 ML
2 SYRINGE (ML) INTRAVENOUS
Status: COMPLETED | OUTPATIENT
Start: 2024-05-20 | End: 2024-05-20

## 2024-05-20 RX ORDER — IBUPROFEN 600 MG/1
600 TABLET, FILM COATED ORAL EVERY 6 HOURS PRN
Status: ON HOLD | COMMUNITY
End: 2024-05-20

## 2024-05-20 RX ORDER — METHOCARBAMOL 500 MG/1
500 TABLET, FILM COATED ORAL EVERY 6 HOURS PRN
Qty: 42 TABLET | Refills: 0 | Status: SHIPPED | OUTPATIENT
Start: 2024-05-20 | End: 2024-07-09

## 2024-05-20 RX ORDER — LIDOCAINE HYDROCHLORIDE 10 MG/ML
INJECTION, SOLUTION INFILTRATION; PERINEURAL PRN
Status: DISCONTINUED | OUTPATIENT
Start: 2024-05-20 | End: 2024-05-20

## 2024-05-20 RX ORDER — ACETAMINOPHEN 325 MG/1
975 TABLET ORAL ONCE
Status: COMPLETED | OUTPATIENT
Start: 2024-05-20 | End: 2024-05-20

## 2024-05-20 RX ADMIN — DEXAMETHASONE SODIUM PHOSPHATE 10 MG: 10 INJECTION, SOLUTION INTRAMUSCULAR; INTRAVENOUS at 12:11

## 2024-05-20 RX ADMIN — PROPOFOL 200 MG: 10 INJECTION, EMULSION INTRAVENOUS at 11:47

## 2024-05-20 RX ADMIN — KETAMINE HYDROCHLORIDE 50 MG: 10 INJECTION INTRAMUSCULAR; INTRAVENOUS at 11:47

## 2024-05-20 RX ADMIN — GLYCOPYRROLATE 0.4 MG: 0.2 INJECTION INTRAMUSCULAR; INTRAVENOUS at 12:27

## 2024-05-20 RX ADMIN — Medication 2 G: at 11:56

## 2024-05-20 RX ADMIN — FENTANYL CITRATE 100 MCG: 50 INJECTION INTRAMUSCULAR; INTRAVENOUS at 12:00

## 2024-05-20 RX ADMIN — ACETAMINOPHEN 975 MG: 325 TABLET ORAL at 09:48

## 2024-05-20 RX ADMIN — MIDAZOLAM 2 MG: 1 INJECTION INTRAMUSCULAR; INTRAVENOUS at 11:37

## 2024-05-20 RX ADMIN — ONDANSETRON 4 MG: 2 INJECTION INTRAMUSCULAR; INTRAVENOUS at 13:01

## 2024-05-20 RX ADMIN — FENTANYL CITRATE 25 MCG: 50 INJECTION, SOLUTION INTRAMUSCULAR; INTRAVENOUS at 14:08

## 2024-05-20 RX ADMIN — METHOCARBAMOL 500 MG: 500 TABLET ORAL at 15:09

## 2024-05-20 RX ADMIN — OXYCODONE HYDROCHLORIDE 5 MG: 5 TABLET ORAL at 14:29

## 2024-05-20 RX ADMIN — ACETAMINOPHEN 975 MG: 325 TABLET ORAL at 15:09

## 2024-05-20 RX ADMIN — KETOROLAC TROMETHAMINE 30 MG: 30 INJECTION, SOLUTION INTRAMUSCULAR at 13:14

## 2024-05-20 RX ADMIN — SODIUM CHLORIDE, POTASSIUM CHLORIDE, SODIUM LACTATE AND CALCIUM CHLORIDE: 600; 310; 30; 20 INJECTION, SOLUTION INTRAVENOUS at 09:47

## 2024-05-20 RX ADMIN — GABAPENTIN 300 MG: 300 CAPSULE ORAL at 09:48

## 2024-05-20 RX ADMIN — LIDOCAINE HYDROCHLORIDE 5 ML: 10 INJECTION, SOLUTION INFILTRATION; PERINEURAL at 11:47

## 2024-05-20 RX ADMIN — SUGAMMADEX 200 MG: 100 INJECTION, SOLUTION INTRAVENOUS at 13:13

## 2024-05-20 RX ADMIN — MAGNESIUM SULFATE HEPTAHYDRATE 4 G: 80 INJECTION, SOLUTION INTRAVENOUS at 09:49

## 2024-05-20 RX ADMIN — FENTANYL CITRATE 25 MCG: 50 INJECTION, SOLUTION INTRAMUSCULAR; INTRAVENOUS at 14:35

## 2024-05-20 RX ADMIN — ROCURONIUM BROMIDE 50 MG: 50 INJECTION, SOLUTION INTRAVENOUS at 11:47

## 2024-05-20 ASSESSMENT — ACTIVITIES OF DAILY LIVING (ADL)
ADLS_ACUITY_SCORE: 29
ADLS_ACUITY_SCORE: 27
ADLS_ACUITY_SCORE: 29

## 2024-05-20 NOTE — ANESTHESIA PREPROCEDURE EVALUATION
Anesthesia Pre-Procedure Evaluation    Patient: Franco Raymond   MRN: [id number] : 1990        Procedure : Procedure(s):  right lumbar 4-lumbar 5 hemilaminectomy, medial facetectomy, foraminotomy and microdiscectomy          Past Medical History:   Diagnosis Date     Calculus of kidney 10/01/2006     Lumbar radiculopathy      Thyrotoxicosis without mention of goiter or other cause, without mention of thyrotoxic crisis or storm       Past Surgical History:   Procedure Laterality Date     SURGICAL HISTORY OF -   10/06    cystoscopy/ureteral stone extraction - dr herndon      Allergies   Allergen Reactions     Compazine [Prochlorperazine]      Dystonic jaw movements        Social History     Tobacco Use     Smoking status: Never     Passive exposure: Yes     Smokeless tobacco: Never     Tobacco comments:     Socially  1-2 per day   Substance Use Topics     Alcohol use: No      Wt Readings from Last 1 Encounters:   24 83.1 kg (183 lb 1.6 oz)        Anesthesia Evaluation   Pt has had prior anesthetic.         ROS/MED HX  ENT/Pulmonary:       Neurologic:       Cardiovascular:       METS/Exercise Tolerance:     Hematologic:       Musculoskeletal:       GI/Hepatic:       Renal/Genitourinary:     (+) renal disease,             Endo:     (+)          thyroid problem,            Psychiatric/Substance Use:       Infectious Disease:       Malignancy:       Other:          Physical Exam    Airway        Mallampati: II    Neck ROM: full     Respiratory Devices and Support         Dental       (+) Minor Abnormalities - some fillings, tiny chips      Cardiovascular   cardiovascular exam normal          Pulmonary   pulmonary exam normal            OUTSIDE LABS:  CBC:   Lab Results   Component Value Date    WBC 4.4 2024    WBC 10.8 2008    HGB 13.8 2024    HGB 14.4 2008    HCT 42.5 2024    HCT 41.4 2008     2024     2008     BMP:   Lab Results   Component  "Value Date     05/08/2024    POTASSIUM 4.7 05/08/2024    CHLORIDE 102 05/08/2024    CO2 29 05/08/2024    BUN 17.4 05/08/2024    CR 1.02 05/08/2024    CR 1.00 01/25/2023    GLC 94 05/08/2024     COAGS: No results found for: \"PTT\", \"INR\", \"FIBR\"  POC: No results found for: \"BGM\", \"HCG\", \"HCGS\"  HEPATIC:   Lab Results   Component Value Date    ALBUMIN 4.6 05/08/2024    PROTTOTAL 7.4 05/08/2024     (H) 05/08/2024    AST 64 (H) 05/08/2024    ALKPHOS 53 05/08/2024    BILITOTAL 0.5 05/08/2024     OTHER:   Lab Results   Component Value Date    SAIMA 9.6 05/08/2024    TSH 1.83 08/28/2014    T4 1.20 08/28/2014    SED 8 01/25/2023       Anesthesia Plan    ASA Status:  2       Anesthesia Type: General.     - Airway: ETT              Consents    Anesthesia Plan(s) and associated risks, benefits, and realistic alternatives discussed. Questions answered and patient/representative(s) expressed understanding.     - Discussed: Risks, Benefits and Alternatives for the PROCEDURE were discussed     - Discussed with:  Patient      - Extended Intubation/Ventilatory Support Discussed: No.      - Patient is DNR/DNI Status: No     Use of blood products discussed: No .     Postoperative Care    Pain management: Multi-modal analgesia.   PONV prophylaxis: Ondansetron (or other 5HT-3), Dexamethasone or Solumedrol     Comments:             Mattie Martínez MD    I have reviewed the pertinent notes and labs in the chart from the past 30 days and (re)examined the patient.  Any updates or changes from those notes are reflected in this note.                  "

## 2024-05-20 NOTE — ANESTHESIA CARE TRANSFER NOTE
Patient: Franco Raymond    Procedure: Procedure(s):  RIGHT LUMBAR FOUR TO LUMBAR FIVE HEMILAMINECTOMY, MEDIAL FACETECTOMY, FORAMINOTOMY AND MICRODISCECTOMY       Diagnosis: Lumbar radiculopathy [M54.16]  Diagnosis Additional Information: No value filed.    Anesthesia Type:   General     Note:    Oropharynx: oropharynx clear of all foreign objects  Level of Consciousness: drowsy  Oxygen Supplementation: nasal cannula  Level of Supplemental Oxygen (L/min / FiO2): 2  Independent Airway: airway patency satisfactory and stable  Dentition: dentition unchanged  Vital Signs Stable: post-procedure vital signs reviewed and stable  Report to RN Given: handoff report given  Patient transferred to: PACU    Handoff Report: Identifed the Patient, Identified the Reponsible Provider, Reviewed the pertinent medical history, Discussed the surgical course, Reviewed Intra-OP anesthesia mangement and issues during anesthesia, Set expectations for post-procedure period and Allowed opportunity for questions and acknowledgement of understanding      Vitals:  Vitals Value Taken Time   /78 05/20/24 1445   Temp 36.4  C (97.5  F) 05/20/24 1445   Pulse 56 05/20/24 1449   Resp 14 05/20/24 1449   SpO2 99 % 05/20/24 1449   Vitals shown include unfiled device data.    Electronically Signed By: NIXON Ceja CRNA  May 20, 2024  3:36 PM

## 2024-05-20 NOTE — ANESTHESIA CARE TRANSFER NOTE
Patient: Franco Raymond    Procedure: Procedure(s):  RIGHT LUMBAR FOUR TO LUMBAR FIVE HEMILAMINECTOMY, MEDIAL FACETECTOMY, FORAMINOTOMY AND MICRODISCECTOMY       Diagnosis: Lumbar radiculopathy [M54.16]  Diagnosis Additional Information: No value filed.    Anesthesia Type:   General     Note:    Oropharynx: oropharynx clear of all foreign objects  Level of Consciousness: drowsy  Oxygen Supplementation: face mask    Independent Airway: airway patency satisfactory and stable  Dentition: dentition unchanged  Vital Signs Stable: post-procedure vital signs reviewed and stable  Report to RN Given: handoff report given  Patient transferred to: PACU    Handoff Report: Identifed the Patient, Identified the Reponsible Provider, Reviewed the pertinent medical history, Discussed the surgical course, Reviewed Intra-OP anesthesia mangement and issues during anesthesia, Set expectations for post-procedure period and Allowed opportunity for questions and acknowledgement of understanding      Vitals:  Vitals Value Taken Time   /86 05/20/24 1332   Temp     Pulse 72 05/20/24 1333   Resp 14 05/20/24 1333   SpO2 100 % 05/20/24 1333   Vitals shown include unfiled device data.    Electronically Signed By: NIXON Ceja CRNA  May 20, 2024  1:35 PM

## 2024-05-20 NOTE — ANESTHESIA POSTPROCEDURE EVALUATION
Patient: Franco Raymond    Procedure: Procedure(s):  RIGHT LUMBAR FOUR TO LUMBAR FIVE HEMILAMINECTOMY, MEDIAL FACETECTOMY, FORAMINOTOMY AND MICRODISCECTOMY       Anesthesia Type:  General    Note:  Disposition: Outpatient   Postop Pain Control: Uneventful            Sign Out: Well controlled pain   PONV: No   Neuro/Psych: Uneventful            Sign Out: Acceptable/Baseline neuro status   Airway/Respiratory: Uneventful            Sign Out: Acceptable/Baseline resp. status   CV/Hemodynamics: Uneventful            Sign Out: Acceptable CV status; No obvious hypovolemia; No obvious fluid overload   Other NRE: NONE   DID A NON-ROUTINE EVENT OCCUR? No           Last vitals:  Vitals Value Taken Time   /86 05/20/24 1332   Temp     Pulse 73 05/20/24 1334   Resp 14 05/20/24 1334   SpO2 100 % 05/20/24 1334   Vitals shown include unfiled device data.    Electronically Signed By: Mattei Martínez MD  May 20, 2024  1:36 PM

## 2024-05-20 NOTE — ANESTHESIA PROCEDURE NOTES
Airway         Procedure Start/Stop Times: 5/20/2024 12:02 PM  Staff -        CRNA: Alexis Anderson APRN CRNA       Performed By: CRNAIndications and Patient Condition       Indications for airway management: isabelle-procedural        Final Airway Details       Final airway type: endotracheal airway       Successful airway: ETT - single  Endotracheal Airway Details        ETT size (mm): 7.5       Cuffed: yes       Successful intubation technique: direct laryngoscopy       DL Blade Type: Prieto 2       Grade View of Cords: 1       Adjucts: stylet       Position: Right       Measured from: gums/teeth    Post intubation assessment        Placement verified by: capnometry and equal breath sounds        Number of attempts at approach: 1       Ease of procedure: easy    Medication(s) Administered   Medication Administration Time: 5/20/2024 12:02 PM

## 2024-05-20 NOTE — BRIEF OP NOTE
[facility]    Brief Operative Note    Pre-operative diagnosis: Lumbar radiculopathy [M54.16]  Post-operative diagnosis Same as pre-operative diagnosis    Procedure: RIGHT LUMBAR FOUR TO LUMBAR FIVE HEMILAMINECTOMY, MEDIAL FACETECTOMY, FORAMINOTOMY AND MICRODISCECTOMY, Right - Spine    Surgeon: Surgeons and Role:     * Viviana Sorensen MD - Primary     * Christine Gentile PA-C - Assisting  Anesthesia: General   Estimated Blood Loss: 25 cc    Drains: None  Specimens: * No specimens in log *  Findings:   Moderate disc herniation .  Complications: None.  Implants: * No implants in log *

## 2024-05-21 ENCOUNTER — MYC MEDICAL ADVICE (OUTPATIENT)
Dept: NEUROSURGERY | Facility: CLINIC | Age: 34
End: 2024-05-21
Payer: COMMERCIAL

## 2024-05-21 DIAGNOSIS — M54.16 RIGHT LUMBAR RADICULOPATHY: ICD-10-CM

## 2024-05-22 RX ORDER — GABAPENTIN 300 MG/1
CAPSULE ORAL
Qty: 90 CAPSULE | Refills: 0 | Status: SHIPPED | OUTPATIENT
Start: 2024-05-22 | End: 2024-07-09

## 2024-05-22 NOTE — OP NOTE
NEUROSURGERY OPERATIVE REPORT     DATE OF SERVICE: 5/20/2024    PREOPERATIVE DIAGNOSES:  Lumbar disc herniation  Lumbar radiculopathy      POSTOPERATIVE DIAGNOSES:  Same     PROCEDURES:  1.  Right lumbar 4-lumbar 5 hemilaminectomy, medial facetectomy, foraminotomy and microdiscectomy  2.  Use of operating room microscope.    SURGEON:  Viviana Sorensen MD    ASSISTANT: Christine Gentile PA-C     INDICATIONS:  Franco Raymond is a 34 yo male who presents with low back and right leg pain, numbness and weakness.  Lumbar x-ray shows no significant instability or subluxation.  MRI of his lumbar spine shows a right paracentral disc protrusion at lumbar 4-5 impinging the right L5 nerve root resulting in moderate overall spinal canal stenosis.  Significant foraminal narrowing at lumbar 5-sacral 1.  Recommend right lumbar 4-lumbar 5 hemilaminectomy, medial facetectomy, foraminotomy and microdiscectomy.  Risks and benefits of lumbar microdiscectomy discussed including but not limited to infection, hematoma, nerve damage including paralysis, post op radiculitis, durotomy, inadequate symptom relief, recurrent disc herniations, risks associated with the use of general anesthesia. He agreed to proceed.    PROCEDURE:  After obtaining informed consent, the patient was brought to the operating room with pneumatic stockings in place.  IV antibiotics was administered.  He was intubated under general endotracheal anesthesia.  He was turned into the radiolucent laminectomy frame with all pressure points well padded.   He was prepped and draped in the usual sterile fashion.  A pre incisional infiltration of local anesthetic was performed along the midline and the incision was opened sharply and extended down to the fascia.  The fascia was opened in one layer with monopolar electrocautery.  A subperiosteal dissection was undertaken to expose the lamina at right lumbar 4-5.   We verified levels with a lateral x-ray.      Once levels were  verified, we then proceeded to remove the inferior portion of the lamina of right lumbar 4 and the superior portion of the lamina of right lumbar 5 with a Midas drill.   We drilled down to the ligamentum elevated the ligamentum from the underlying thecal sac and removed it.  We brought in the operating room microscope for this and all microdissection.  We next drilled a medial facetectomy and foraminotomy opening up the lateral recess and expose the underlying impinged nerve root.  We exposed the annulus of the disc space identified the nerve and cut the annulus as needed to evacuate the disc bulge to release impingement on the nerve.  We used a combination of pituitaries and curettes to remove sufficient disc and then used blunt hooks under the thecal sac and in the foramen to verify that there was no further disc material to milk out into the dissection site.  Once the nerve was completely decompressed we used a ball tip probe to verify no further pressure either in the canal or in the foramina.  The lateral recess appeared decompressed.       An assistant was needed for positioning, retraction and closure.    We then proceeded to copiously irrigate the incision with antibiotic-containing saline and achieved hemostasis.   The incision was closed in layers with interrupted 0 Vicryl to approximate the muscle and fascia, inverted 2-0 Vicryl to approximate the subcutaneous tissues and Monocryl to approximate the skin edges.  Dermabond was then placed. Sponge and needle counts were correct prior to closure x 2.  Sterile dressings were placed.      Patient tolerated the procedure well, was taken to the recovery room where he was extubated and found to be at his neurological baseline with no new deficits appreciated.    ESTIMATED BLOOD LOSS:25 ml    FINDINGS: Moderate disc herniation     DRAIN: none    IMPLANTS: none      Viviana Sorensen MD    Cc: No Ref-Primary, Physician

## 2024-06-01 ENCOUNTER — HEALTH MAINTENANCE LETTER (OUTPATIENT)
Age: 34
End: 2024-06-01

## 2024-06-04 ENCOUNTER — ALLIED HEALTH/NURSE VISIT (OUTPATIENT)
Dept: NEUROSURGERY | Facility: CLINIC | Age: 34
End: 2024-06-04
Payer: COMMERCIAL

## 2024-06-04 VITALS
SYSTOLIC BLOOD PRESSURE: 137 MMHG | DIASTOLIC BLOOD PRESSURE: 85 MMHG | OXYGEN SATURATION: 99 % | HEART RATE: 68 BPM | TEMPERATURE: 98.4 F

## 2024-06-04 DIAGNOSIS — Z98.890 S/P SPINAL SURGERY: Primary | ICD-10-CM

## 2024-06-04 PROCEDURE — 99207 PR NO CHARGE NURSE ONLY: CPT

## 2024-06-04 NOTE — PROGRESS NOTES
POSTOPERATIVE FOLLOW-UP NURSE VISIT NOTE    Procedure: RIGHT LUMBAR FOUR TO LUMBAR FIVE HEMILAMINECTOMY, MEDIAL FACETECTOMY, FORAMINOTOMY AND MICRODISCECTOMY     Date: 5/20/24    Surgeon: Edu    Pre-op symptoms: low back and right leg pain, numbness and weakness     Post-op symptoms: pain is resolved, numbness is resolved, weakness is resolved    Pain and medications: 0/10  Methocarbamol 500 mg Q6 PRN: not taking  Oxycodone 5 mg Q4 PRN: not taking    Bracing compliance: N/A    Medications refilled: none    Imaging ordered: N/A    Wound: CDI, approximated, no drainage, no erythema, no temperature change compared to surrounding tissue, mildly tender. Dermabond in place, no other closures detected.

## 2024-06-04 NOTE — PATIENT INSTRUCTIONS
Follow-up 7/2/24 at 12:45 PM.    A dressing is not required.    Keep the wound clean.    Wash your hands before touching the wound.  Ensure that anyone assisting you in the care of your wound washes her/his hands before touching the wound. Good handwashing can decrease the risk of serious infection.    If you are unable to see your wound, have someone check the wound daily for redness, swelling,or drainage. A small amount of drainage is normal.    You may shower.  Pat the wound dry. Do not rub.    No tub baths until the wound is well healed.  Usually 5-6 weeks.     If you develop redness, swelling, drainage, or temp 101 or greater, call our clinic.    * No lifting, pushing or pulling greater than 5-10 pound (this is about a gallon of milk) for the first 6 weeks after surgery .  *No repetitive bending, twisting, or jarring activities for 6 weeks.  *No overhead work  *No aerobic or strenuous activity  *No activities with increased risk of falls  *You may move about your home as tolerated  *You may walk up and down stairs as tolerated  *You may increase your activity slowly over the next 6 weeks    WALKING PROGRAM: As you can tolerate, walk daily-start with 5-10 minutes of continuous walking. This is in addition to the walking that you do as part of your daily activities. Increase the time that you walk by 5 minutes every couple of days. Do not exceed 30-45 minutes of continuous walking until seen in follow-up. Walking is the best exercise after surgery.  **Listen to your body, if you find that you are more painful or fatigued, you may need to proceed more slowly.    **Do not smoke or expose yourself to second hand smoke. Cigarette smoke can delay healing and cause complications.     DRIVING:  We recommend that you do not drive while taking medications for pain or muscle spasms. Always read and follow the advice on your prescription bottle. If you have questions, speak with your pharmacist.  We recommend that you do  not drive while wearing a brace, as it could limit your range of motion.    WORK: If you plan to return to work before your 6 week post-op appointment, call and discuss with one of the nurses in the neurosurgery office.

## 2024-07-09 ENCOUNTER — OFFICE VISIT (OUTPATIENT)
Dept: NEUROSURGERY | Facility: CLINIC | Age: 34
End: 2024-07-09
Payer: COMMERCIAL

## 2024-07-09 DIAGNOSIS — M54.16 RIGHT LUMBAR RADICULOPATHY: Primary | ICD-10-CM

## 2024-07-09 PROCEDURE — 99024 POSTOP FOLLOW-UP VISIT: CPT | Performed by: NURSE PRACTITIONER

## 2024-07-09 ASSESSMENT — PAIN SCALES - GENERAL: PAINLEVEL: NO PAIN (0)

## 2024-07-09 NOTE — PROGRESS NOTES
"CHIEF COMPLAINT / REASON FOR VISIT  post operative visit.    ASSESSMENT/PLAN:  (M54.16) Right lumbar radiculopathy  (primary encounter diagnosis)  Comment: As he is doing very well with no concerns.  He no longer has any pain.  May liberalize restrictions slowly pain attention to body.  Increase lift by 5 pounds per week and maintain that weight if he has any consequent pain.  May increase walking.  We also discussed using muscle relaxers as needed as he starts to increase his physical activities.  Plan: Physical Therapy  Referral  Follow-up with Dr. Sorensen after 8/19/2024.  Patient will call to set that up if he feels he needs to see her.      HISTORY OF PRESENT ILLNESS  Fracno Raymond is a 33 year old male who underwent right L4-5 hemilaminectomy, medial facetectomy, foraminotomy, and microdiscectomy by Dr. Sorensen on 5/20/2024 for treatment of radiculopathy.      PAIN: Aside from some incisional pain, he really had no pain since about 2 weeks after surgery.  He did have some \"ghost along that nerve root, but all of that has subsided.\" symptoms    INCISION: Is watched carefully and it has not given him any problems    ACTIVITY: He is slowly increased his activity and is walking about 3 miles a day.  He is doing some light weight lifting with his upper body.  He is also yesterday he has restarted some of his martial arts stretches.  He is wanting to be active within his limits.    NEW SYMPTOMS: None    WORK/SCHOOL: Patient works as a  and sits at a computer no limitations.    Current Outpatient Medications   Medication Sig Dispense Refill    NO ACTIVE MEDICATIONS  0 0     No current facility-administered medications for this visit.       PHYSICAL EXAMINATION     General:  Well-appearing, non-distressed.  HEENT: Face is symmetric, hearing intact in general conversation.  Integument: Incision is well-healed without any areas of skin breakdown or erythema  Musculoskeletal/Neurological: good bulk tone " and strength bilateral upper extremities, no focal deficits on exam, symmetric sensation to light touch.

## 2024-07-09 NOTE — PATIENT INSTRUCTIONS
Liberalize lift restrictions by adding 5 lbs/week. If at any time you experience back pain or spasms, stop at that weight for 1 week and proceed with the same schedule.    We have ordered PT for you.    Return for your 3 month post op appointment with the surgeon if desired.

## 2024-07-09 NOTE — LETTER
"7/9/2024      Franco Raymond  [address]  [facility] [address]      Dear Colleague,    Thank you for referring your patient, Franco Raymond, to the [facility]. Please see a copy of my visit note below.    CHIEF COMPLAINT / REASON FOR VISIT  post operative visit.    ASSESSMENT/PLAN:  (M54.16) Right lumbar radiculopathy  (primary encounter diagnosis)  Comment: As he is doing very well with no concerns.  He no longer has any pain.  May liberalize restrictions slowly pain attention to body.  Increase lift by 5 pounds per week and maintain that weight if he has any consequent pain.  May increase walking.  We also discussed using muscle relaxers as needed as he starts to increase his physical activities.  Plan: Physical Therapy  Referral  Follow-up with Dr. Sorensen after 8/19/2024.  Patient will call to set that up if he feels he needs to see her.      HISTORY OF PRESENT ILLNESS  Franco Raymond is a 33 year old male who underwent right L4-5 hemilaminectomy, medial facetectomy, foraminotomy, and microdiscectomy by Dr. Sorensen on 5/20/2024 for treatment of radiculopathy.      PAIN: Aside from some incisional pain, he really had no pain since about 2 weeks after surgery.  He did have some \"ghost along that nerve root, but all of that has subsided.\" symptoms    INCISION: Is watched carefully and it has not given him any problems    ACTIVITY: He is slowly increased his activity and is walking about 3 miles a day.  He is doing some light weight lifting with his upper body.  He is also yesterday he has restarted some of his martial arts stretches.  He is wanting to be active within his limits.    NEW SYMPTOMS: None    WORK/SCHOOL: Patient works as a  and sits at a computer no limitations.    Current Outpatient Medications   Medication Sig Dispense Refill     NO ACTIVE MEDICATIONS  0 0     No current facility-administered medications for this visit.       PHYSICAL EXAMINATION   "   General:  Well-appearing, non-distressed.  HEENT: Face is symmetric, hearing intact in general conversation.  Integument: Incision is well-healed without any areas of skin breakdown or erythema  Musculoskeletal/Neurological: good bulk tone and strength bilateral upper extremities, no focal deficits on exam, symmetric sensation to light touch.        Again, thank you for allowing me to participate in the care of your patient.        Sincerely,        NIXON Foster CNP

## 2024-07-18 ENCOUNTER — THERAPY VISIT (OUTPATIENT)
Dept: PHYSICAL THERAPY | Facility: CLINIC | Age: 34
End: 2024-07-18
Attending: NURSE PRACTITIONER
Payer: COMMERCIAL

## 2024-07-18 DIAGNOSIS — M54.16 RIGHT LUMBAR RADICULOPATHY: ICD-10-CM

## 2024-07-18 PROCEDURE — 97161 PT EVAL LOW COMPLEX 20 MIN: CPT | Mod: GP | Performed by: PHYSICAL THERAPIST

## 2024-07-18 PROCEDURE — 97110 THERAPEUTIC EXERCISES: CPT | Mod: GP | Performed by: PHYSICAL THERAPIST

## 2024-07-18 NOTE — PROGRESS NOTES
PHYSICAL THERAPY EVALUATION  Type of Visit: Evaluation       Fall Risk Screen:  Fall screen completed by: PT  Have you fallen 2 or more times in the past year?: No  Have you fallen and had an injury in the past year?: No  Is patient a fall risk?: No    Subjective     LUMBAR   Date of Surgery: 5/20/24  Start of Care date: 7/18/24  Surgical Procedure/Limb: Right L4-L5 hemilaminectomy, medial facetectomy, foraminotomy and microdiscectomy  Surgeon Name: Viviana Sorensen MD  Precautions/Restrictions: limit bending/lifting, increase lift 5# each week     Average Daily Pain Levels: 0/10 - more tightness. Reduction of LE pains since surgery  Other Symptoms:  lower mid-back pinching   Symptom Mgmt Strategies: walking, small exercises, OTC pain meds occasionally     Prior orthopaedic history/procedures: NA  Corby MULLEN Appt Date: Dr. Sorensen after 8/19/2024 prn    Functional limitations following procedure: NA - He  is doing well, progressing slowly but at an appropriate pace. Running/jogging.   Previous level of function: unable to stand without pain, unable to do jui jitsu. He is much better now post op     L SHOULDER  Pain started without PRANAY in the last 4-5 weeks.   Location: anterior shoulder on L  He notes ROM is overall good but certain movements are pinching     GOALS: granby roll, look at my watch. biking          Presenting condition or subjective complaint: recovery from surgery,shoulder pain  Date of onset: 05/20/24    Relevant medical history: Overweight; Smoking; Thyroid problems   Dates & types of surgery: microdiscectomy, 7 weeks ago    Prior diagnostic imaging/testing results: MRI; X-ray     Prior therapy history for the same diagnosis, illness or injury:        Prior Level of Function  Transfers:   Ambulation:   ADL:   IADL:     Living Environment  Social support: With family members   Type of home: House   Stairs to enter the home: Yes     Ramp: No   Stairs inside the home: Yes     Help at home:  None  Equipment owned:     Employment: Yes design  Hobbies/Interests: eddie simmons  Patient goals for therapy: granby roll, look at my watch. biking  Pain assessment: Pain present     Objective       LUMBAR SPINE EVALUATION    GAIT:   Weightbearing Status: WBAT  Assistive Device(s): None  Gait Deviations: WFL - prev antalgic gait prior to surgery       ROM Min, mod, max loss   Lumbar Flexion Min loss   Lumbar Extension Min - pinch in mid-back    Lumbar Side Bend     Torso Rotation (seated)        Hip ROM screen: overall WFL - slight limitation into hip IR B      LE Flexibility (Supine) ROM    Trunk Rotation WFL - slight pain on R going R   Hamstring 90-90 <70 B; tight and has been tight even prior to hx of LBP   Piriformis    Single/Double Knee to Chest SKTC with slight pain on RLB with L knee flexion        MYOTOMES: WFL B- slight weakness 4/5 to R DF      Functional Core/Gluteal Strength:   Bridging: good ROM, troubles engagement with large ROM, better with VC for smaller ROM with emphasis on core and glute control/activation   Sidelying Leg Raise (glute med): 5/5 L, 4-/5 L, no pain   Squat: slight increase in RLB pain, no change with VC for core engagement. Overall decent form,   SLS: WFL no pain       SHOULDER EVALUATION      Scapular retraction: WFL. No change in pain with positional pinching     ROM: overall WFL - slight pain with slight horizontal adduction       STRENGTH: WFL overall but pain limited in ER, especially in 90-90 position       SPECIAL TESTS: NT      Assessment & Plan   CLINICAL IMPRESSIONS  Medical Diagnosis: Right lumbar radiculopathy    Treatment Diagnosis: R low back pain and L shoulder pain   Impression/Assessment: Patient is a 33 year old male with LB and L shoulder complaints.  The following significant findings have been identified: Pain, Decreased ROM/flexibility, Decreased strength, Impaired muscle performance, Decreased activity tolerance, and Impaired posture. These impairments  interfere with their ability to perform self care tasks, recreational activities, household chores, and community mobility as compared to previous level of function.     Clinical Decision Making (Complexity):  Clinical Presentation: Stable/Uncomplicated  Clinical Presentation Rationale: based on medical and personal factors listed in PT evaluation  Clinical Decision Making (Complexity): Low complexity    PLAN OF CARE  Treatment Interventions:  Interventions: Gait Training, Manual Therapy, Neuromuscular Re-education, Therapeutic Activity, Therapeutic Exercise, Self-Care/Home Management    Long Term Goals     PT Goal 1  Goal Identifier: HEP  Goal Description: Pt will demonstrate mastery of HEP, completing at least 5x/week, without need for additional cuing in order to increase independence with self cares and self management of the condition.  Target Date: 10/18/24  PT Goal 2  Goal Identifier: Luis Perea  Goal Description: Pt will be able to return to jui jivandanau with minimal modifications, increased confidence, and minimal increase in pain in order to increase QOL and return to a beloved activity.  Target Date: 10/18/24  PT Goal 3  Goal Identifier: Look at watch  Goal Description: Pt will be able complete the ROM in the shoulder  to look at his watch iconsistently without pain  Target Date: 10/18/24      Frequency of Treatment: 2x/month  Duration of Treatment: 3 months    Recommended Referrals to Other Professionals:   Education Assessment:   Learner/Method: Patient  Education Comments: Eager to participate in therapy    Risks and benefits of evaluation/treatment have been explained.   Patient/Family/caregiver agrees with Plan of Care.     Evaluation Time:     PT Eval, Low Complexity Minutes (86484): 20       Signing Clinician: Melvina Alfredo, PT

## 2024-08-02 ENCOUNTER — THERAPY VISIT (OUTPATIENT)
Dept: PHYSICAL THERAPY | Facility: CLINIC | Age: 34
End: 2024-08-02
Payer: COMMERCIAL

## 2024-08-02 DIAGNOSIS — M54.16 RIGHT LUMBAR RADICULOPATHY: Primary | ICD-10-CM

## 2024-08-02 DIAGNOSIS — M54.41 ACUTE RIGHT-SIDED LOW BACK PAIN WITH RIGHT-SIDED SCIATICA: ICD-10-CM

## 2024-08-02 DIAGNOSIS — M51.369 DDD (DEGENERATIVE DISC DISEASE), LUMBAR: ICD-10-CM

## 2024-08-02 PROCEDURE — 97110 THERAPEUTIC EXERCISES: CPT | Mod: GP

## 2024-08-04 PROBLEM — M51.369 DDD (DEGENERATIVE DISC DISEASE), LUMBAR: Status: ACTIVE | Noted: 2024-08-04

## 2024-08-15 ENCOUNTER — THERAPY VISIT (OUTPATIENT)
Dept: PHYSICAL THERAPY | Facility: CLINIC | Age: 34
End: 2024-08-15
Payer: COMMERCIAL

## 2024-08-15 DIAGNOSIS — M51.369 DDD (DEGENERATIVE DISC DISEASE), LUMBAR: ICD-10-CM

## 2024-08-15 DIAGNOSIS — M54.41 ACUTE RIGHT-SIDED LOW BACK PAIN WITH RIGHT-SIDED SCIATICA: Primary | ICD-10-CM

## 2024-08-15 DIAGNOSIS — M54.16 RIGHT LUMBAR RADICULOPATHY: ICD-10-CM

## 2024-08-15 PROCEDURE — 97110 THERAPEUTIC EXERCISES: CPT | Mod: GP | Performed by: PHYSICAL THERAPIST

## 2024-08-15 NOTE — PROGRESS NOTES
08/15/24 0500   Appointment Info   Signing clinician's name / credentials Miracle Simon, DPT, OCS/Jaspal Lucas, SPT   Total/Authorized Visits E&T (6 planned)   Visits Used 3   Medical Diagnosis Right lumbar radiculopathy   PT Tx Diagnosis R low back pain and L shoulder pain   Other pertinent information s/p L4-L5 laminectomy and discectomy   Progress Note/Certification   Onset of illness/injury or Date of Surgery 05/20/24   Therapy Frequency 2x/month   Predicted Duration 3 months   Progress Note Completed Date 07/18/24   GOALS   PT Goals 2;3   PT Goal 1   Goal Identifier HEP   Goal Description Pt will demonstrate mastery of HEP, completing at least 5x/week, without need for additional cuing in order to increase independence with self cares and self management of the condition.   Target Date 10/18/24   PT Goal 2   Goal Identifier [employer]   Goal Description Pt will be able to return to [employer] with minimal modifications, increased confidence, and minimal increase in pain in order to increase QOL and return to a beloved activity.   Target Date 10/18/24   PT Goal 3   Goal Identifier Look at watch   Goal Description Pt will be able complete the ROM in the shoulder  to look at his watch iconsistently without pain   Target Date 10/18/24   Subjective Report   Subjective Report The pt mentions he has been okay. He has not been as consistent with his program because he was traveling for work. His shoulder is his most painful at the moment. Otherwise he feels good overall.   Treatment Interventions (PT)   Interventions Therapeutic Procedure/Exercise   Therapeutic Procedure/Exercise   Therapeutic Procedures: strength, endurance, ROM, flexibility minutes (72208) 40   Therapeutic Procedures Ther Proc 9;Ther Proc 10;Ther Proc 7;Ther Proc 8;Ther Proc 4;Ther Proc 5;Ther Proc 6   Ther Proc 1 Education   Ther Proc 1 - Details educated regarding the role of therapeutic exercise, POC, expected goal in therapy, nature of  condition   Ther Proc 8 Birddog   Ther Proc 8 - Details 2x20 reps total; foam roller used on back to help with stability.   Ther Proc 9 dead bugs   Ther Proc 9 - Details NT   Ther Proc 10 bear crawl   Ther Proc 10 - Details NT   PTRx Ther Proc 1 Supine Lumbar Hip Roll   PTRx Ther Proc 1 - Details VR   PTRx Ther Proc 2 Supine Hamstring Stretch   PTRx Ther Proc 2 - Details VR   PTRx Ther Proc 3 Bridging #1   PTRx Ther Proc 3 - Details NT   PTRx Ther Proc 4 Hip Abduction Straight Leg Raise   PTRx Ther Proc 4 - Details NT   PTRx Ther Proc 5 Shoulder Theraband External Rotation   PTRx Ther Proc 5 - Details NT; pain   PTRx Ther Proc 6 Theraband Shoulder External Rotation at 90/90   PTRx Ther Proc 6 - Details NT; pain   Skilled Intervention Assessment of strength and functional deficits to determine exercise prescription; tactile and verbal cuing to assist with proper performance; education in loading principles and expected response   Patient Response/Progress tolerated well   Ther Proc 7 Prone I's on workout ball   Ther Proc 7 - Details 2x15 reps; VC given to squeeze shoudler blades together and not raise arms past shoulder height   Ther Proc 4 90/90 hip thrusters   Ther Proc 4 - Details 2x10 w/black theraband around waist   Ther Proc 5 Push up with elbow to knee crunch   Ther Proc 5 - Details x10 reps;   Ther Proc 6 Prone T's on workout ball   Ther Proc 6 - Details 2x15 reps; VC given to squeeze shoudler blades together and not raise arms past shoulder height   Education   Learner/Method Patient   Education Comments Eager to participate in therapy   Plan   Home program printed/online   Updates to plan of care added dead bug push up, 90/90 hip thrusters, prone ball I's and T's   Plan for next session progress core strength, check on shoulder pain/strength   Total Session Time   Timed Code Treatment Minutes 40   Total Treatment Time (sum of timed and untimed services) 40         PLAN  Continue therapy per current plan of  care.    Beginning/End Dates of Progress Note Reporting Period:  07/18/24 to 08/15/2024    Referring Provider:  Delia Sauceda

## 2024-08-22 ENCOUNTER — OFFICE VISIT (OUTPATIENT)
Dept: NEUROSURGERY | Facility: CLINIC | Age: 34
End: 2024-08-22
Attending: NURSE PRACTITIONER
Payer: COMMERCIAL

## 2024-08-22 VITALS — DIASTOLIC BLOOD PRESSURE: 79 MMHG | HEART RATE: 66 BPM | OXYGEN SATURATION: 98 % | SYSTOLIC BLOOD PRESSURE: 118 MMHG

## 2024-08-22 DIAGNOSIS — Z98.890 S/P SPINAL SURGERY: Primary | ICD-10-CM

## 2024-08-22 PROCEDURE — 99213 OFFICE O/P EST LOW 20 MIN: CPT | Performed by: SURGERY

## 2024-08-22 RX ORDER — OMEGA-3 FATTY ACIDS/FISH OIL 300-1000MG
200 CAPSULE ORAL EVERY 4 HOURS PRN
COMMUNITY

## 2024-08-22 ASSESSMENT — PAIN SCALES - GENERAL: PAINLEVEL: MILD PAIN (2)

## 2024-08-22 NOTE — PROGRESS NOTES
NEUROSURGERY FOLLOW UP  NOTE    Franco Raymond comes today in follow-up. Patient is a 34 yo male right L4-5 hemilaminectomy, medial facetectomy, foraminotomy, and microdiscectomy on 5/20/2024.  Right leg symptoms resolved. Mild soreness today after physical activity last night. Walking regularly. Off pain medications other then ibuprofen occasionally.     PHYSICAL EXAM:   Constitutional: /79   Pulse 66   SpO2 98%      Mental Status: A & O in no acute distress.  Affect is appropriate.  Speech is fluent.  Recent and remote memory are intact.  Attention span and concentration are normal.     Motor:  Normal bulk and tone all muscle groups of upper and lower extremities in LE      Sensory: Sensation intact bilaterally to light touch in LE        IMAGING:   I personally reviewed all radiographic images        CONSULTATION ASSESSMENT AND PLAN:    Overall doing well 3 months after microdiscectomy. He will continue therapy and return to clinic if new or worsening symptoms.     Viviana Sorensen MD      CC:     No Ref-Primary, Physician  No address on file

## 2024-08-22 NOTE — NURSING NOTE
Franco Raymond is a 33 year old male who presents for:  Chief Complaint   Patient presents with    Follow Up     3 month post op -  Pain has improved post op   PT         Initial Vitals:  /79   Pulse 66   SpO2 98%  Estimated body mass index is 24.83 kg/m  as calculated from the following:    Height as of 5/8/24: 6' (1.829 m).    Weight as of 5/20/24: 183 lb 1.6 oz (83.1 kg).. There is no height or weight on file to calculate BSA. BP completed using cuff size: regular  Mild Pain (2)        Tom Latham

## 2024-08-22 NOTE — LETTER
8/22/2024      Franco Raymond  [address]  [facility] [address]      Dear Colleague,    Thank you for referring your patient, Franco Raymond, to the [facility]. Please see a copy of my visit note below.    NEUROSURGERY FOLLOW UP  NOTE    Franco Raymond comes today in follow-up. Patient is a 34 yo male right L4-5 hemilaminectomy, medial facetectomy, foraminotomy, and microdiscectomy on 5/20/2024.  Right leg symptoms resolved. Mild soreness today after physical activity last night. Walking regularly. Off pain medications other then ibuprofen occasionally.     PHYSICAL EXAM:   Constitutional: /79   Pulse 66   SpO2 98%      Mental Status: A & O in no acute distress.  Affect is appropriate.  Speech is fluent.  Recent and remote memory are intact.  Attention span and concentration are normal.     Motor:  Normal bulk and tone all muscle groups of upper and lower extremities in LE      Sensory: Sensation intact bilaterally to light touch in LE        IMAGING:   I personally reviewed all radiographic images        CONSULTATION ASSESSMENT AND PLAN:    Overall doing well 3 months after microdiscectomy. He will continue therapy and return to clinic if new or worsening symptoms.     Viviana Sorensen MD      CC:     No Ref-Primary, Physician  No address on file      Again, thank you for allowing me to participate in the care of your patient.        Sincerely,        Viviana Sorensen MD

## 2024-09-20 ENCOUNTER — NURSE TRIAGE (OUTPATIENT)
Dept: NURSING | Facility: CLINIC | Age: 34
End: 2024-09-20

## 2024-09-20 ENCOUNTER — OFFICE VISIT (OUTPATIENT)
Dept: URGENT CARE | Facility: URGENT CARE | Age: 34
End: 2024-09-20
Payer: COMMERCIAL

## 2024-09-20 VITALS
HEART RATE: 64 BPM | OXYGEN SATURATION: 100 % | DIASTOLIC BLOOD PRESSURE: 92 MMHG | SYSTOLIC BLOOD PRESSURE: 146 MMHG | TEMPERATURE: 98.5 F

## 2024-09-20 DIAGNOSIS — Z86.39 HISTORY OF GRAVES' DISEASE: ICD-10-CM

## 2024-09-20 DIAGNOSIS — F41.1 GENERALIZED ANXIETY DISORDER: ICD-10-CM

## 2024-09-20 DIAGNOSIS — F32.1 MAJOR DEPRESSIVE DISORDER, SINGLE EPISODE, MODERATE (H): Primary | ICD-10-CM

## 2024-09-20 PROCEDURE — 80053 COMPREHEN METABOLIC PANEL: CPT | Performed by: INTERNAL MEDICINE

## 2024-09-20 PROCEDURE — 84443 ASSAY THYROID STIM HORMONE: CPT | Performed by: INTERNAL MEDICINE

## 2024-09-20 PROCEDURE — 84439 ASSAY OF FREE THYROXINE: CPT | Performed by: INTERNAL MEDICINE

## 2024-09-20 PROCEDURE — 99214 OFFICE O/P EST MOD 30 MIN: CPT | Performed by: INTERNAL MEDICINE

## 2024-09-20 PROCEDURE — 36415 COLL VENOUS BLD VENIPUNCTURE: CPT | Performed by: INTERNAL MEDICINE

## 2024-09-20 RX ORDER — LORAZEPAM 1 MG/1
1-2 TABLET ORAL EVERY 6 HOURS PRN
Qty: 20 TABLET | Refills: 0 | Status: SHIPPED | OUTPATIENT
Start: 2024-09-20

## 2024-09-20 RX ORDER — PAROXETINE HYDROCHLORIDE HEMIHYDRATE 12.5 MG/1
TABLET, FILM COATED, EXTENDED RELEASE ORAL
Qty: 49 TABLET | Refills: 1 | Status: SHIPPED | OUTPATIENT
Start: 2024-09-20 | End: 2024-10-18

## 2024-09-20 ASSESSMENT — ANXIETY QUESTIONNAIRES
7. FEELING AFRAID AS IF SOMETHING AWFUL MIGHT HAPPEN: SEVERAL DAYS
3. WORRYING TOO MUCH ABOUT DIFFERENT THINGS: MORE THAN HALF THE DAYS
1. FEELING NERVOUS, ANXIOUS, OR ON EDGE: MORE THAN HALF THE DAYS
2. NOT BEING ABLE TO STOP OR CONTROL WORRYING: MORE THAN HALF THE DAYS
5. BEING SO RESTLESS THAT IT IS HARD TO SIT STILL: SEVERAL DAYS
GAD7 TOTAL SCORE: 12
IF YOU CHECKED OFF ANY PROBLEMS ON THIS QUESTIONNAIRE, HOW DIFFICULT HAVE THESE PROBLEMS MADE IT FOR YOU TO DO YOUR WORK, TAKE CARE OF THINGS AT HOME, OR GET ALONG WITH OTHER PEOPLE: SOMEWHAT DIFFICULT
6. BECOMING EASILY ANNOYED OR IRRITABLE: MORE THAN HALF THE DAYS
GAD7 TOTAL SCORE: 12

## 2024-09-20 ASSESSMENT — PATIENT HEALTH QUESTIONNAIRE - PHQ9
5. POOR APPETITE OR OVEREATING: MORE THAN HALF THE DAYS
SUM OF ALL RESPONSES TO PHQ QUESTIONS 1-9: 18

## 2024-09-21 LAB
ALBUMIN SERPL BCG-MCNC: 4.9 G/DL (ref 3.5–5.2)
ALP SERPL-CCNC: 50 U/L (ref 40–150)
ALT SERPL W P-5'-P-CCNC: 55 U/L (ref 0–70)
ANION GAP SERPL CALCULATED.3IONS-SCNC: 9 MMOL/L (ref 7–15)
AST SERPL W P-5'-P-CCNC: 36 U/L (ref 0–45)
BILIRUB SERPL-MCNC: 0.3 MG/DL
BUN SERPL-MCNC: 12.6 MG/DL (ref 6–20)
CALCIUM SERPL-MCNC: 9.5 MG/DL (ref 8.8–10.4)
CHLORIDE SERPL-SCNC: 103 MMOL/L (ref 98–107)
CREAT SERPL-MCNC: 1.05 MG/DL (ref 0.67–1.17)
EGFRCR SERPLBLD CKD-EPI 2021: >90 ML/MIN/1.73M2
GLUCOSE SERPL-MCNC: 99 MG/DL (ref 70–99)
HCO3 SERPL-SCNC: 28 MMOL/L (ref 22–29)
POTASSIUM SERPL-SCNC: 4.2 MMOL/L (ref 3.4–5.3)
PROT SERPL-MCNC: 8.1 G/DL (ref 6.4–8.3)
SODIUM SERPL-SCNC: 140 MMOL/L (ref 135–145)
T4 FREE SERPL-MCNC: 1.31 NG/DL (ref 0.9–1.7)
TSH SERPL DL<=0.005 MIU/L-ACNC: 8.16 UIU/ML (ref 0.3–4.2)

## 2024-09-21 NOTE — TELEPHONE ENCOUNTER
Pt was seen in [facility] today. Reports he should have two prescriptions to  but only one is at the pharmacy. Writer can see on file that the Ativan prescription was printed. Pt doesn't remember being given a paper form but agreed to look through the paperwork in the car. Pt was able to find the printed prescription and will bring it back into the pharmacy to have filled.         No need for triage.         Margi Virk RN on 9/20/2024 at 9:06 PM    Reason for Disposition    General information question, no triage required and triager able to answer question    Protocols used: Information Only Call - No Triage-A-

## 2024-09-21 NOTE — PATIENT INSTRUCTIONS
We will start some treatment with two different medications that are aiming to alleviate depression and anxiety symptoms.  Paroxetine (Paxil) that is designed to slowly kick in and reduce anxiety over the long-term.  This will be the primary medication at this point  Lorazepam -- this is a medication that will work right away during this intense period of anxiety and is not intended to be a long-term solution for you.  But will be helpful right now in the early phase.      Will be important to follow-up in a couple weeks to make sure that things are working effectively.    In the meantime, if symptoms are getting worse then come back to Urgent Care or go to Emergency Room.

## 2024-09-21 NOTE — PROGRESS NOTES
"SUBJECTIVE:  Chief complaint of anxiety that he describes as \"acute\".  He notes that he has been feeling some increased pressure with work.  Noting a tightness in the chest, nausea, difficulty with holding his thoughts.  Racing thoughts. Reports that he has had some prior episodes like this.     SOCH: notes increase in work stress; EtOH intake is social; works as a ; the patient denies access to lethal means, specifically firearms; he denies a specific plan for self-harm; he is able to cite reasons for living (his wife and child) and reports good social and relational support from his wife; he is able to articulate a plan for seeking help if suicidality increases    FMH: \"issues\" on his mother's side and his mother may have had bipolar disorder and committed suicide; maternal GM may have also had bipolar disorder and Parkinson's    PMH: Graves' -- treated with methimazole, microdiscectomy; atopic dermatits    CMEDS: no Rx; THC gummies for \"coping with stress\"        9/20/2024     7:30 PM   MAT-7 SCORE   Total Score 12         9/20/2024     7:30 PM   PHQ   PHQ-9 Total Score 18   Q9: Thoughts of better off dead/self-harm past 2 weeks Several days     ROS:  The following systems have been completely reviewed and are negative except as noted in the HPI: CONSTITUTIONAL, CARDIOVASCULAR, GASTROINTESTINAL, ENDOCRINE, NEUROLOGIC, and PSYCHIATRIC    OBJECTIVE:  BP (!) 146/92   Pulse 64   Temp 98.5  F (36.9  C) (Oral)   SpO2 100%   GENERAL: alert and quite anxious, restless  NECK: no adenopathy, no asymmetry, masses, or scars and thyroid normal to palpation  RESP: clear to auscultation and percussion bilaterally; normal I:E ratio  CV: regular rates and rhythm, normal S1 S2, no S3 or S4 and no murmur, click or rub -  EXT: no cyanosis, clubbing or edema; peripheral pulses are brisk and symmetric in the radials, dorsalis pedis and posterior tibials bilaterally  SKIN: no suspicious lesions or rashes  NEURO: CN " 2-12 intact, motor 5/5 throughout, no ataxia or dysmetria, fine postural tremor on outstretched arms, normal tone, patellar DTRs are 1+ bilaterally  PSYCH: affect is flat, anxious; thought content is goal-directed but with some background obsessing on work issues and intrusive thoughts; thought process is linear but distractable and requiring effort for concentration    ASSESSMENT/PLAN:    ICD-10-CM    1. Major depressive disorder, single episode, moderate (H)  F32.1 TSH with free T4 reflex     Comprehensive metabolic panel (BMP + Alb, Alk Phos, ALT, AST, Total. Bili, TP)     PARoxetine (PAXIL-CR) 12.5 MG 24 hr tablet     LORazepam (ATIVAN) 1 MG tablet     PRIMARY CARE FOLLOW-UP SCHEDULING     TSH with free T4 reflex     Comprehensive metabolic panel (BMP + Alb, Alk Phos, ALT, AST, Total. Bili, TP)      2. Generalized anxiety disorder  F41.1 PARoxetine (PAXIL-CR) 12.5 MG 24 hr tablet     LORazepam (ATIVAN) 1 MG tablet     PRIMARY CARE FOLLOW-UP SCHEDULING      3. History of Graves' disease  Z86.39 TSH with free T4 reflex     Comprehensive metabolic panel (BMP + Alb, Alk Phos, ALT, AST, Total. Bili, TP)     TSH with free T4 reflex     Comprehensive metabolic panel (BMP + Alb, Alk Phos, ALT, AST, Total. Bili, TP)        The patient contracts for safety and he does not have risk factors for completed suicide.  He has outpatient therapist that he has been working with.  Will start SSRI therapy with paroxetine and titrate up while adding short-term benzodiazepine to manage both current anxiety and treatment-emergent anxiety.  Monitor for treatment-emergent maxwell given his family history of bipolar disorder.  Refer for short-term primary care follow-up both to establish care and to titrate paroxetine.      Waylon Sandoval MD

## 2024-10-01 ENCOUNTER — THERAPY VISIT (OUTPATIENT)
Dept: PHYSICAL THERAPY | Facility: CLINIC | Age: 34
End: 2024-10-01
Payer: COMMERCIAL

## 2024-10-01 DIAGNOSIS — M54.41 ACUTE RIGHT-SIDED LOW BACK PAIN WITH RIGHT-SIDED SCIATICA: Primary | ICD-10-CM

## 2024-10-01 DIAGNOSIS — M51.369 DDD (DEGENERATIVE DISC DISEASE), LUMBAR: ICD-10-CM

## 2024-10-01 DIAGNOSIS — M54.16 RIGHT LUMBAR RADICULOPATHY: ICD-10-CM

## 2024-10-01 PROCEDURE — 97110 THERAPEUTIC EXERCISES: CPT | Mod: GP | Performed by: PHYSICAL THERAPIST

## 2024-10-01 NOTE — PROGRESS NOTES
DISCHARGE  Reason for Discharge: Patient has met all goals.    Equipment Issued: HEP    Discharge Plan: Patient to continue home program. Will check in with PT as needed.     Referring Provider:  Delia Sauceda

## 2024-10-07 ENCOUNTER — TELEPHONE (OUTPATIENT)
Dept: FAMILY MEDICINE | Facility: CLINIC | Age: 34
End: 2024-10-07
Payer: COMMERCIAL

## 2024-10-07 NOTE — TELEPHONE ENCOUNTER
Thank you for this message. It is ok to to go back down to 12.5mg once daily. We will likely discuss alternatives at their appt with me.     HTM

## 2024-10-07 NOTE — TELEPHONE ENCOUNTER
Tra -- I know you have not seen pt yet but no pcp and last seen in UC. Would it be reasonable to return to the 12.5 mg once daily dose and then follow-up further when you see patient on 10/17 to [facility] care?    Experiencing side effects of the paroxetine   In the second week here of taking the medication has been experiencing drowsiness and dizziness  Has been able to increase the dose to 12.5 mg BID  States he did not note any side effects at the 12.5 mg once a day dose    RN -- ok to send response in jacy Barrera RN  [facility]

## 2024-10-08 ENCOUNTER — MYC MEDICAL ADVICE (OUTPATIENT)
Dept: FAMILY MEDICINE | Facility: CLINIC | Age: 34
End: 2024-10-08
Payer: COMMERCIAL

## 2024-10-08 NOTE — TELEPHONE ENCOUNTER
[employer] message sent to patient.    LEONEL Dao, RN-BC  [facility]

## 2024-10-17 ENCOUNTER — OFFICE VISIT (OUTPATIENT)
Dept: FAMILY MEDICINE | Facility: CLINIC | Age: 34
End: 2024-10-17
Attending: INTERNAL MEDICINE
Payer: COMMERCIAL

## 2024-10-17 ENCOUNTER — ANCILLARY PROCEDURE (OUTPATIENT)
Dept: GENERAL RADIOLOGY | Facility: CLINIC | Age: 34
End: 2024-10-17
Attending: STUDENT IN AN ORGANIZED HEALTH CARE EDUCATION/TRAINING PROGRAM
Payer: COMMERCIAL

## 2024-10-17 VITALS
OXYGEN SATURATION: 99 % | WEIGHT: 182.5 LBS | BODY MASS INDEX: 24.72 KG/M2 | HEIGHT: 72 IN | TEMPERATURE: 98.3 F | DIASTOLIC BLOOD PRESSURE: 87 MMHG | HEART RATE: 62 BPM | SYSTOLIC BLOOD PRESSURE: 139 MMHG | RESPIRATION RATE: 16 BRPM

## 2024-10-17 DIAGNOSIS — E03.8 SUBCLINICAL HYPOTHYROIDISM: ICD-10-CM

## 2024-10-17 DIAGNOSIS — M25.512 CHRONIC LEFT SHOULDER PAIN: ICD-10-CM

## 2024-10-17 DIAGNOSIS — R74.8 ELEVATED LIVER ENZYMES: ICD-10-CM

## 2024-10-17 DIAGNOSIS — G89.29 CHRONIC LEFT SHOULDER PAIN: ICD-10-CM

## 2024-10-17 DIAGNOSIS — F41.1 GENERALIZED ANXIETY DISORDER: ICD-10-CM

## 2024-10-17 DIAGNOSIS — Z11.4 SCREENING FOR HIV (HUMAN IMMUNODEFICIENCY VIRUS): ICD-10-CM

## 2024-10-17 DIAGNOSIS — Z13.220 LIPID SCREENING: ICD-10-CM

## 2024-10-17 DIAGNOSIS — Z11.59 NEED FOR HEPATITIS C SCREENING TEST: ICD-10-CM

## 2024-10-17 DIAGNOSIS — Z13.1 SCREENING FOR DIABETES MELLITUS: ICD-10-CM

## 2024-10-17 DIAGNOSIS — Z00.00 ROUTINE GENERAL MEDICAL EXAMINATION AT A HEALTH CARE FACILITY: Primary | ICD-10-CM

## 2024-10-17 DIAGNOSIS — F32.1 MAJOR DEPRESSIVE DISORDER, SINGLE EPISODE, MODERATE (H): ICD-10-CM

## 2024-10-17 LAB
CHOLEST SERPL-MCNC: 234 MG/DL
EST. AVERAGE GLUCOSE BLD GHB EST-MCNC: 108 MG/DL
FASTING STATUS PATIENT QL REPORTED: NO
HBA1C MFR BLD: 5.4 % (ref 0–5.6)
HCV AB SERPL QL IA: NONREACTIVE
HDLC SERPL-MCNC: 86 MG/DL
LDLC SERPL CALC-MCNC: 136 MG/DL
NONHDLC SERPL-MCNC: 148 MG/DL
TRIGL SERPL-MCNC: 60 MG/DL

## 2024-10-17 PROCEDURE — 90656 IIV3 VACC NO PRSV 0.5 ML IM: CPT | Performed by: STUDENT IN AN ORGANIZED HEALTH CARE EDUCATION/TRAINING PROGRAM

## 2024-10-17 PROCEDURE — 73030 X-RAY EXAM OF SHOULDER: CPT | Mod: TC | Performed by: RADIOLOGY

## 2024-10-17 PROCEDURE — 86803 HEPATITIS C AB TEST: CPT | Performed by: STUDENT IN AN ORGANIZED HEALTH CARE EDUCATION/TRAINING PROGRAM

## 2024-10-17 PROCEDURE — 87389 HIV-1 AG W/HIV-1&-2 AB AG IA: CPT | Performed by: STUDENT IN AN ORGANIZED HEALTH CARE EDUCATION/TRAINING PROGRAM

## 2024-10-17 PROCEDURE — 80061 LIPID PANEL: CPT | Performed by: STUDENT IN AN ORGANIZED HEALTH CARE EDUCATION/TRAINING PROGRAM

## 2024-10-17 PROCEDURE — 90471 IMMUNIZATION ADMIN: CPT | Performed by: STUDENT IN AN ORGANIZED HEALTH CARE EDUCATION/TRAINING PROGRAM

## 2024-10-17 PROCEDURE — 83036 HEMOGLOBIN GLYCOSYLATED A1C: CPT | Performed by: STUDENT IN AN ORGANIZED HEALTH CARE EDUCATION/TRAINING PROGRAM

## 2024-10-17 PROCEDURE — 91320 SARSCV2 VAC 30MCG TRS-SUC IM: CPT | Performed by: STUDENT IN AN ORGANIZED HEALTH CARE EDUCATION/TRAINING PROGRAM

## 2024-10-17 PROCEDURE — 99395 PREV VISIT EST AGE 18-39: CPT | Mod: 25 | Performed by: STUDENT IN AN ORGANIZED HEALTH CARE EDUCATION/TRAINING PROGRAM

## 2024-10-17 PROCEDURE — 99214 OFFICE O/P EST MOD 30 MIN: CPT | Mod: 25 | Performed by: STUDENT IN AN ORGANIZED HEALTH CARE EDUCATION/TRAINING PROGRAM

## 2024-10-17 PROCEDURE — 90480 ADMN SARSCOV2 VAC 1/ONLY CMP: CPT | Performed by: STUDENT IN AN ORGANIZED HEALTH CARE EDUCATION/TRAINING PROGRAM

## 2024-10-17 PROCEDURE — 36415 COLL VENOUS BLD VENIPUNCTURE: CPT | Performed by: STUDENT IN AN ORGANIZED HEALTH CARE EDUCATION/TRAINING PROGRAM

## 2024-10-17 RX ORDER — PAROXETINE HYDROCHLORIDE HEMIHYDRATE 12.5 MG/1
12.5 TABLET, FILM COATED, EXTENDED RELEASE ORAL EVERY MORNING
Qty: 90 TABLET | Refills: 3 | Status: SHIPPED | OUTPATIENT
Start: 2024-10-17 | End: 2025-10-12

## 2024-10-17 ASSESSMENT — ANXIETY QUESTIONNAIRES
IF YOU CHECKED OFF ANY PROBLEMS ON THIS QUESTIONNAIRE, HOW DIFFICULT HAVE THESE PROBLEMS MADE IT FOR YOU TO DO YOUR WORK, TAKE CARE OF THINGS AT HOME, OR GET ALONG WITH OTHER PEOPLE: NOT DIFFICULT AT ALL
GAD7 TOTAL SCORE: 0
3. WORRYING TOO MUCH ABOUT DIFFERENT THINGS: NOT AT ALL
1. FEELING NERVOUS, ANXIOUS, OR ON EDGE: NOT AT ALL
7. FEELING AFRAID AS IF SOMETHING AWFUL MIGHT HAPPEN: NOT AT ALL
8. IF YOU CHECKED OFF ANY PROBLEMS, HOW DIFFICULT HAVE THESE MADE IT FOR YOU TO DO YOUR WORK, TAKE CARE OF THINGS AT HOME, OR GET ALONG WITH OTHER PEOPLE?: NOT DIFFICULT AT ALL
5. BEING SO RESTLESS THAT IT IS HARD TO SIT STILL: NOT AT ALL
2. NOT BEING ABLE TO STOP OR CONTROL WORRYING: NOT AT ALL
7. FEELING AFRAID AS IF SOMETHING AWFUL MIGHT HAPPEN: NOT AT ALL
GAD7 TOTAL SCORE: 0
4. TROUBLE RELAXING: NOT AT ALL
6. BECOMING EASILY ANNOYED OR IRRITABLE: NOT AT ALL
GAD7 TOTAL SCORE: 0

## 2024-10-17 ASSESSMENT — PAIN SCALES - GENERAL: PAINLEVEL: MILD PAIN (2)

## 2024-10-17 NOTE — PATIENT INSTRUCTIONS
Patient Education     Info About My Practice:   Thank you for coming to see me today! Here are a couple of pieces of information about my schedule and communication practices.     I am not in the clinic on Tuesdays. Non-urgent calls and messages received on Tuesdays will be addressed as soon as I am able when I am back in the office on the next business day. Urgent calls will be addressed by a covering clinician.       If lab work was done today as part of your evaluation you will generally be contacted via Unbound[employer], mail, or phone with the results within 7 days. I encourage you to sign up for Fast [employer] ([email or web address]). If there is an alarming/concerning result we will contact you by phone. Lab results come back at varying times, I generally wait until all labs are resulted before making comments on results, but if any labs look concerning, I will reach out sooner. Please note, labs are automatically released to Un[employer] once available, but it may take a couple of days for my interpretation note to appear.      I try very hard to respond to medical messages with 2 business days of receiving them. Occasionally it takes me longer if I am trying to figure out the best way to respond and need to seek guidance, do some research or dig deeper into your medical history to come up with a helpful response.      If you need refills please contact your pharmacist. They will send a refill request to me to review. Please allow 3-5 business days for us to respond to all refill requests.      Please call or send a medical message with any questions or concerns. I do ask that all patients who are taking chronic medications for conditions that I am managing schedule an in-person visit with me at least once a year. Otherwise, I am happy to see you for acute concerns or other questions via in-person or virtual visit. If you are not able to get an appointment with me in the timeframe you desire, please ask to speak  with one of the [facility] nurses who may be able to access a sooner appointment.      Thank you for trusting me to be part of your healthcare team!    CHRISTIN [facility]    Preventive Care Advice   This is general advice given by our system to help you stay healthy. However, your care team may have specific advice just for you. Please talk to your care team about your preventive care needs.  Nutrition  Eat 5 or more servings of fruits and vegetables each day.  Try wheat bread, brown rice and whole grain pasta (instead of white bread, rice, and pasta).  Get enough calcium and vitamin D. Check the label on foods and aim for 100% of the RDA (recommended daily allowance).  Lifestyle  Exercise at least 150 minutes each week  (30 minutes a day, 5 days a week).  Do muscle strengthening activities 2 days a week. These help control your weight and prevent disease.  No smoking.  Wear sunscreen to prevent skin cancer.  Have a dental exam and cleaning every 6 months.  Yearly exams  See your health care team every year to talk about:  Any changes in your health.  Any medicines your care team has prescribed.  Preventive care, family planning, and ways to prevent chronic diseases.  Shots (vaccines)   HPV shots (up to age 26), if you've never had them before.  Hepatitis B shots (up to age 59), if you've never had them before.  COVID-19 shot: Get this shot when it's due.  Flu shot: Get a flu shot every year.  Tetanus shot: Get a tetanus shot every 10 years.  Pneumococcal, hepatitis A, and RSV shots: Ask your care team if you need these based on your risk.  Shingles shot (for age 50 and up)  General health tests  Diabetes screening:  Starting at age 35, Get screened for diabetes at least every 3 years.  If you are younger than age 35, ask your care team if you should be screened for diabetes.  Cholesterol test: At age 39, start having a cholesterol test every 5 years, or more often if  advised.  Bone density scan (DEXA): At age 50, ask your care team if you should have this scan for osteoporosis (brittle bones).  Hepatitis C: Get tested at least once in your life.  STIs (sexually transmitted infections)  Before age 24: Ask your care team if you should be screened for STIs.  After age 24: Get screened for STIs if you're at risk. You are at risk for STIs (including HIV) if:  You are sexually active with more than one person.  You don't use condoms every time.  You or a partner was diagnosed with a sexually transmitted infection.  If you are at risk for HIV, ask about PrEP medicine to prevent HIV.  Get tested for HIV at least once in your life, whether you are at risk for HIV or not.  Cancer screening tests  Cervical cancer screening: If you have a cervix, begin getting regular cervical cancer screening tests starting at age 21.  Breast cancer scan (mammogram): If you've ever had breasts, begin having regular mammograms starting at age 40. This is a scan to check for breast cancer.  Colon cancer screening: It is important to start screening for colon cancer at age 45.  Have a colonoscopy test every 10 years (or more often if you're at risk) Or, ask your provider about stool tests like a FIT test every year or Cologuard test every 3 years.  To learn more about your testing options, visit:   .  For help making a decision, visit:   https://bit.ly/ob58123.  Prostate cancer screening test: If you have a prostate, ask your care team if a prostate cancer screening test (PSA) at age 55 is right for you.  Lung cancer screening: If you are a current or former smoker ages 50 to 80, ask your care team if ongoing lung cancer screenings are right for you.  For informational purposes only. Not to replace the advice of your health care provider. Copyright   2023 [employer]. All rights reserved. Clinically reviewed by the [facility] Transitions Program. [employer] 368178 - REV 01/24.

## 2024-10-17 NOTE — PROGRESS NOTES
Assessment & Plan     Routine general medical examination at a health care facility  Immunizations: COVID and Flu vaccines - given today.   STI Screenings: Declined.   BP: Borderline at 139/87, will keep an eye on this moving forward.  Hearing and Vision: No concerns   Dental Health: No concerns; see dentist regularly.   Preventative Labs:   - Liver enzymes elevated in May, normalized on CMP recheck recently. CBC normal in May. See below for abnormal thyroid testing.   - Lipid panel and A1c ordered.   Skin Cancer: No personal/family history of skin cancer. No concerning skin lesions. Recommended utilization of sunscreen SPF 50 when outside with reapplication every 2 hours.  Colon Cancer: Not due.   Prostate Cancer: Not due.   Testicular Cancer: Reviewed that testicular cancer most often effects younger men and recommended regular self exams of both testicles and coming in if any bumps or lumps are felt especially if painless.  Lung Cancer: quit 12+ years ago, not due.   AAA: quit 12+ years ago, not due.     Discussed getting at least 150 minutes of aerobic exercise weekly, healthy diet with focus on 4-5 serving of fruits/veggies, lean meat, and reducing saturated fats/sugars, and getting 7-8 hours of sleep nightly (should feel rested when waking up or not getting enough).    - INFLUENZA VACCINE,SPLIT VIRUS,TRIVALENT,PF(FLUZONE)  - COVID-19 12+ (PFIZER)  - Hemoglobin A1c; Future  - Lipid panel reflex to direct LDL Non-fasting; Future    Screening for HIV (human immunodeficiency virus)  - HIV Antigen Antibody Combo; Future    Need for hepatitis C screening test  - Hepatitis C Screen Reflex to HCV RNA Quant and Genotype; Future    Lipid screening  - Lipid panel reflex to direct LDL Non-fasting; Future    Screening for diabetes mellitus  - Hemoglobin A1c; Future    Major depressive disorder, single episode, moderate (H)  Generalized anxiety disorder  Stable on Paxil 12.5 mg daily with no side effects.  Plan to  continue, refill x 1 year provided.  I encouraged him to follow-up with me via e-visit or phone/in-person visit if any worsening symptoms.    - PRIMARY CARE FOLLOW-UP SCHEDULING  - PARoxetine (PAXIL-CR) 12.5 MG 24 hr tablet; Take 1 tablet (12.5 mg) by mouth every morning.    Chronic left shoulder pain  Exam today is supportive of rotator cuff tendinopathy, specifically the supraspinatus muscle, but there is no signs of a complete tear that would warrant MRI imaging or referral to ortho right away.  Obtain x-ray to rule out fracture or other acute etiology.  If x-ray normal, plan for him to return for left subacromial steroid injection and we will have him continue physical therapy.  Consider referral to Ortho if not improving with this plan.    - XR Shoulder Left 2 Views; Future    Subclinical hypothyroidism  TSH was abnormal at 8.16 but normal free T4.  Explained the diagnosis of subclinical hypothyroidism to patient and we will plan to monitor thyroid function yearly.    Elevated liver enzymes  AST and ALT were mildly elevated in May 2024, then normalized when checked in September 2024. No significant etoh intake. No recent travel outside of [place]. Denies any jaundice, nausea/vomiting, abdominal pain, or stool changes. Plan to recheck these yearly.      Sumit Turner is a 34 year old, presenting for the following health issues:  Hospital F/U, Shoulder Pain (Rotator cuff), and Imm/Inj        10/17/2024     3:47 PM   Additional Questions   Roomed by Machelle MCDANIEL     Shoulder Pain    Imm/Inj    History of Present Illness       Mental Health Follow-up:  Patient presents to follow-up on Depression & Anxiety.Patient's depression since last visit has been:  Better  The patient is not having other symptoms associated with depression.  Patient's anxiety since last visit has been:  Better  The patient is not having other symptoms associated with anxiety.  Any significant life events: No  Patient is not feeling anxious or  having panic attacks.  Patient has no concerns about alcohol or drug use.    Reason for visit:  Anxiety, shoulder pain, vaccine    He eats 2-3 servings of fruits and vegetables daily.He consumes 0 sweetened beverage(s) daily.He exercises with enough effort to increase his heart rate 60 or more minutes per day.  He exercises with enough effort to increase his heart rate 3 or less days per week.   He is taking medications regularly.    Here to establish care.      Anxiety:   He is here to follow-up on anxiety. Seen by urgent care on 9/20/24, and was prescribed paxil 12.5mg daily and ativan 1mg PRN. He had side effects with paxil 25mg daily and when taking ativan PRN, but when he reduced the paxil dose to 12.5mg and stopped using ativan, he has significant improvement in anxiety and energy levels and no side effects. He is seeing a therapist every other week. He was using THC and minimal alcohol intake but stopped use recently when he was put on selective serotonin reuptake inhibitor therapy.     Shoulder Pain:   He reports left anterior shoulder pain, which seemed to start with lifting and exercise roughly 6 months ago. No specific injury/trauma that started pain. No neck pain. No left upper extremity weakness, numbness, tingling, or overlying redness/bruising. No systemic symptoms like fever or chills. Started PT several months ago, which helps some.                 Objective    /87 (BP Location: Right arm, Patient Position: Sitting, Cuff Size: Adult Large)   Pulse 62   Temp 98.3  F (36.8  C) (Temporal)   Resp 16   Ht 1.829 m (6')   Wt 82.8 kg (182 lb 8 oz)   SpO2 99%   BMI 24.75 kg/m    Body mass index is 24.75 kg/m .  Physical Exam   GENERAL: alert and no distress  EYES: Eyes grossly normal to inspection, PERRL and conjunctivae and sclerae normal  HENT: ear canals and TM's normal, nose and mouth without ulcers or lesions  NECK: no adenopathy, no asymmetry, masses, or scars. No thyromegaly or nodules  palpated.  RESP: lungs clear to auscultation - no rales, rhonchi or wheezes  CV: regular rate and rhythm, normal S1 S2, no S3 or S4, no murmur, click or rub, no peripheral edema  ABDOMEN: soft, nontender, no hepatosplenomegaly, no masses and bowel sounds normal  Left Shoulder  Inspection: No bony/muscle deformities, erythema, ecchymosis, or muscle atrophy.   Palpation: Tender over proximal arm where supraspinatus and infraspinatus attach. Otherwise, shoulder non-tender.   ROM: Full ROM with external rotation, internal rotation, forward flexion, abduction, and cross-body adduction but elicited pain.   Strength: Slightly reduced strength with external rotation, internal rotation, forward flexion, abduction, and cross-body adduction.   Lawson Impingement Test: positive.  Neer's Test: positive.  Empty Can Supraspinatus Test: positive.  O'Paul's Test: negative.  Speed's Test: negative.  Lift Off Subscapularus Test: negative.  Examination of the contralateral shoulder is negative for abnormal findings.   Extremity: Neurovascularuly intact. Motor and sensory function of upper extremities are intact with no apparent deficits. Triceps reflexes 2+ bilaterally. Radial pulse 2+ bilaterally.    SKIN: no suspicious lesions or rashes  NEURO: Normal strength and tone, mentation intact and speech normal  PSYCH: mentation appears normal, affect normal/bright          Signed Electronically by: Tra Lombardo PA-C

## 2024-10-18 ENCOUNTER — OFFICE VISIT (OUTPATIENT)
Dept: FAMILY MEDICINE | Facility: CLINIC | Age: 34
End: 2024-10-18
Payer: COMMERCIAL

## 2024-10-18 DIAGNOSIS — M25.512 CHRONIC LEFT SHOULDER PAIN: Primary | ICD-10-CM

## 2024-10-18 DIAGNOSIS — G89.29 CHRONIC LEFT SHOULDER PAIN: Primary | ICD-10-CM

## 2024-10-18 LAB — HIV 1+2 AB+HIV1 P24 AG SERPL QL IA: NONREACTIVE

## 2024-10-18 PROCEDURE — 20610 DRAIN/INJ JOINT/BURSA W/O US: CPT | Mod: LT | Performed by: STUDENT IN AN ORGANIZED HEALTH CARE EDUCATION/TRAINING PROGRAM

## 2024-10-18 RX ORDER — METHYLPREDNISOLONE ACETATE 40 MG/ML
40 INJECTION, SUSPENSION INTRA-ARTICULAR; INTRALESIONAL; INTRAMUSCULAR; SOFT TISSUE ONCE
Status: COMPLETED | OUTPATIENT
Start: 2024-10-18 | End: 2024-10-18

## 2024-10-18 RX ADMIN — METHYLPREDNISOLONE ACETATE 40 MG: 40 INJECTION, SUSPENSION INTRA-ARTICULAR; INTRALESIONAL; INTRAMUSCULAR; SOFT TISSUE at 10:31

## 2024-10-18 NOTE — PROGRESS NOTES
Procedure Note:   Left subacromial steroid injection.     Consent: Affirmation of informed consent was signed and scanned into the medical record. Risks, benefits and alternatives were discussed. Patient's questions were elicited and answered.   Procedure safety checklist was completed:  Yes  Time Out (Pause for the Cause) completed: Yes    Preoperative Diagnosis: Rotator cuff tendinopathy  Postoperative Diagnosis: same  The left shoulder was prepped  in the usual sterile fashion INJECTION:  Using 4 mL of 1% bupivacaine mixed with 40 mg of kenalog, the was successfully injected without complication.  Patient did experience some pain relief following injection.    Technique:   Skin prep Chloraprep   Complications:  No  Tolerance:  Pt tolerated procedure well and was in stable condition.     Follow up: Patient was instructed to use ice (no heat or soaking joint for 48 hours) on the affected area tonight for at least 30 minutes and that there will be a return to pain in a couple hours followed by relief in the next several days to a week. Pt was instructed to call if bleeding, severe pain, or fever/chills.     Tra Lombardo PA-C

## 2024-11-13 ENCOUNTER — MYC MEDICAL ADVICE (OUTPATIENT)
Dept: FAMILY MEDICINE | Facility: CLINIC | Age: 34
End: 2024-11-13
Payer: COMMERCIAL

## 2024-11-13 DIAGNOSIS — M25.512 CHRONIC LEFT SHOULDER PAIN: Primary | ICD-10-CM

## 2024-11-13 DIAGNOSIS — G89.29 CHRONIC LEFT SHOULDER PAIN: Primary | ICD-10-CM

## 2024-11-18 NOTE — PROGRESS NOTES
"SPORTS [facility]ICINE CLINIC NEW PATIENT VISIT    REFERRAL SOURCE: Tra Lombardo PA-C    PATIENT'S GOAL FOR APPOINTMENT: \"to be able to do the chicken dance, get back to where I'm not constantly favoring and adjusting it\"    HISTORY OF PRESENT ILLNESS  Franco Raymond is a 34 year old Right-handed male  who is presenting as a new patient with left shoulder pain    When did problem start?/Trauma associated with onset?:  1 year. Gradual onset with weight training.     Location & description of pain:  Anterior shoulder, lateral deltoid    Exacerbating factors:   \"Checking watch\" motion  Pushing    Remitting factors:  Physical Therapy, Rest, Injection    Previous Treatments:  -Medication: None  -Rehabilitation: Physical Therapy for shoulders for at least 6 months  -Durable Medical Equipment: None  -Injections: Left subacromial steroid injection 10/18/24 (doesn't appear ultrasound guided)- improved symptoms by 30-40% but still limited ROM and soreness. 8-9/10 down to 4/10.   -Modalities: Ice  -Other Providers seen: JONAS Lombardo    Sports, Hobbies, Employment:  [employer]     Average hours of sleep per night: 6-8 hours, difficulty falling asleep and laying on the left side    Average minutes of exercise per day: 3 x Week [employer], Average 30-60 min/hour    Area of Problem  11/19/2024  Date 2 Date 3 Date 4 Date 5   Function Ability in last week - % of Baseline (0 is worst & 100 is best) 75%       Sport/Activity Ability in last week - % of Baseline (0 is worst & 100 is best) 75%       Pain Level in the last week (0 is best & 10 is worst) 8-9         Additional Information of consideration:  -Poor Balance? X  -Bowel/Bladder Incontinence? X  -Numbness/paresthesias in extremities? X     MEDICATIONS    Current Outpatient Medications:     PARoxetine (PAXIL-CR) 12.5 MG 24 hr tablet, Take 1 tablet (12.5 mg) by mouth every morning., Disp: 90 tablet, Rfl: 3    ALLERGIES  Allergies   Allergen Reactions    " Compazine [Prochlorperazine]      Dystonic jaw movements         PAST MEDICAL HISTORY  Past Medical History:   Diagnosis Date    Calculus of kidney 10/01/2006    Lumbar radiculopathy     Thyrotoxicosis without mention of goiter or other cause, without mention of thyrotoxic crisis or storm        PAST SURGICAL HISTORY  Past Surgical History:   Procedure Laterality Date    LAMINECTOMY LUMBAR ONE LEVEL Right 5/20/2024    Procedure: RIGHT LUMBAR FOUR TO LUMBAR FIVE HEMILAMINECTOMY, MEDIAL FACETECTOMY, FORAMINOTOMY AND MICRODISCECTOMY;  Surgeon: Viviana Sorensen MD;  Location: [facility] Main OR    SURGICAL HISTORY OF -   10/06    cystoscopy/ureteral stone extraction - dr herndon       SOCIAL HISTORY  Social History     Socioeconomic History    Marital status: Single     Spouse name: Not on file    Number of children: Not on file    Years of education: Not on file    Highest education level: Not on file   Occupational History    Not on file   Tobacco Use    Smoking status: Never     Passive exposure: Yes    Smokeless tobacco: Never    Tobacco comments:     Socially  1-2 per day   Vaping Use    Vaping status: Never Used   Substance and Sexual Activity    Alcohol use: No    Drug use: No    Sexual activity: Not on file   Other Topics Concern    Not on file   Social History Narrative    Not on file     Social Drivers of Health     Financial Resource Strain: Low Risk  (5/6/2024)    Financial Resource Strain     Within the past 12 months, have you or your family members you live with been unable to get utilities (heat, electricity) when it was really needed?: No   Food Insecurity: Low Risk  (5/6/2024)    Food Insecurity     Within the past 12 months, did you worry that your food would run out before you got money to buy more?: No     Within the past 12 months, did the food you bought just not last and you didn t have money to get more?: No   Transportation Needs: Low Risk  (5/6/2024)    Transportation Needs     Within the  past 12 months, has lack of transportation kept you from medical appointments, getting your medicines, non-medical meetings or appointments, work, or from getting things that you need?: No   Physical Activity: Not on file   Stress: Not on file   Social Connections: Not on file   Interpersonal Safety: Low Risk  (5/8/2024)    Interpersonal Safety     Do you feel physically and emotionally safe where you currently live?: Yes     Within the past 12 months, have you been hit, slapped, kicked or otherwise physically hurt by someone?: No     Within the past 12 months, have you been humiliated or emotionally abused in other ways by your partner or ex-partner?: No   Housing Stability: Low Risk  (5/6/2024)    Housing Stability     Do you have housing? : Yes     Are you worried about losing your housing?: No       FAMILY HISTORY  Family History   Problem Relation Age of Onset    Family History Negative Unknown        REVIEW OF SYSTEMS  Complete 12 system Review of Systems performed and was negative except for HPI.    VITALS  There were no vitals filed for this visit.    PHYSICAL EXAMINATION   General: Age appropriate appearing, no acute distress  HEENT: normocephalic, atraumatic, sclera non-icteric  Skin: No open skin lesion noted in visible areas.  Respiratory: Non labored breathing, No wheezes.  Cardiac/Vessels: No edema, cyanosis, clubbing noted in all extremities.  Lymph: No palpable lymph node swelling noted around the affected area.  Mental: There was no signs of aberrant behaviors noted. Patient was pleasant throughout the encounter.    Functional Movements: Non-antalgic gait appreciated.      SHOULDER:   Inspection: No notable asymmetry appreciated upon exam bilaterally  Palpation: Mild TTP bicipital groove area.  No TTP SCJ, clavicle, ACJ, anterior and posterior glenohumeral joint area.    Range of Motion (Estimated, Active unless otherwise noted, L/R):   Flexion (normal = 170-180 degrees): normal/normal  Abduction  "(normal = 170-180 degrees): normal/normal  External Rotation (normal = 90 degrees): normal/normal  Internal Rotation: Reaching hands behind back, with right thumb reaches T4 & left thumb reached T4  Reflexes  [L (0-4) / R (0-4)]:  Biceps [2/2], Triceps [2/2], Brachioradialis [2/2]  Sensation:  Intact to light touch bilaterally at the shoulders, lateral elbow, medial elbow, dorsum of thumb, dorsum of 3rd digit, and dorsum of 5th digit.  Strength [L (0-5) / R (0-5)]:   Shoulder: Abduction [5/4*], External Rotation [5/4*], Internal Rotation [5/5]  *(infraspinatus)    Special Testing:   Neers (-)  Empty Can (+)  Lawson (+)  Speeds (+)  Scarf test (+)  O'hakan's (+)  Anterior apprehension test (-)  posterior dynamic shear (-)    CERVICAL SPINE  Special testing: left spurlings negative    IMAGING STUDIES:  10/17/2024 Left shoulder radiograph \"Normal joint spaces and alignment. No fracture. \"    I personally reviewed these images and agree with the radiology report and shared the findings with the patient.    IMPRESSION  Franco Raymond is a very pleasant 34 year old right-hand-dominant male user experience  who enjoys weight training and [employer] and is presenting today with left shoulder anterolateral pain most exacerbated with resisted external rotation, and with partial improvement following subacromial/subdeltoid bursa injection that seems related to rotator cuff (primarily subscapularis) tendinopathy.  He also has tenderness in the bicipital groove and positive speeds and O'briens concerning for bicipital tendinopathy/superior labral pathology.  He doesn't have clinical or radiographic findings of fracture, osteoarthritis or other osseus abnormality.    PLAN  The following was discussed with the patient:  Activity Modification: As tolerated  Imaging/Tests: Previous radiographs reviewed as noted above.  MRI ordered  Rehabilitation: Continue physical therapy/Home exercises  Orthotics/Bracing: None " recommended  Medication: No changes  Interventions: s/p subdeltoid bursa corticosteroid injection on 10/18/2024.  None recommended at this time  Referral: None at this time  Follow-up Plan: After MRI  Resources Provided: Written and verbal information detailing above findings and plan provided including after visit summary.    They were encouraged to message me on [employer] whenever they needed.    The patient was in agreement with this plan. All questions were answered to the best of my ability.    Total time (face-to-face and non-face-to-face) spent on today's visit was 40 minutes. This included preparation for the visit (i.e. reviewing test results), performance of a medically  appropriate history and examination, placing orders for medications, tests or other procedures, and discussing the plan of care with the patient. This time is exclusive of procedures performed and time spent teaching.     Martina Tamez MD, [facility]  Sports Medicine Attending Physician  Department of Physical Medicine & Rehabilitation

## 2024-11-19 ENCOUNTER — OFFICE VISIT (OUTPATIENT)
Dept: ORTHOPEDICS | Facility: CLINIC | Age: 34
End: 2024-11-19
Attending: STUDENT IN AN ORGANIZED HEALTH CARE EDUCATION/TRAINING PROGRAM
Payer: COMMERCIAL

## 2024-11-19 DIAGNOSIS — G89.29 CHRONIC LEFT SHOULDER PAIN: ICD-10-CM

## 2024-11-19 DIAGNOSIS — M25.512 CHRONIC LEFT SHOULDER PAIN: ICD-10-CM

## 2024-11-19 PROCEDURE — 99203 OFFICE O/P NEW LOW 30 MIN: CPT | Performed by: STUDENT IN AN ORGANIZED HEALTH CARE EDUCATION/TRAINING PROGRAM

## 2024-11-19 NOTE — LETTER
"11/19/2024      Franco Raymond  [address]  [facility] [address]      Dear Colleague,    Thank you for referring your patient, Franco Raymond, to the [facility]TS [facility] [place]. Please see a copy of my visit note below.    SPORTS MEDICINE CLINIC NEW PATIENT VISIT    REFERRAL SOURCE: Tra Lombardo PA-C    PATIENT'S GOAL FOR APPOINTMENT: \"to be able to do the chicken dance, get back to where I'm not constantly favoring and adjusting it\"    HISTORY OF PRESENT ILLNESS  Franco Raymond is a 34 year old Right-handed male  who is presenting as a new patient with left shoulder pain    When did problem start?/Trauma associated with onset?:  1 year. Gradual onset with weight training.     Location & description of pain:  Anterior shoulder, lateral deltoid    Exacerbating factors:   \"Checking watch\" motion  Pushing    Remitting factors:  Physical Therapy, Rest, Injection    Previous Treatments:  -Medication: None  -Rehabilitation: Physical Therapy for shoulders for at least 6 months  -Durable Medical Equipment: None  -Injections: Left subacromial steroid injection 10/18/24 (doesn't appear ultrasound guided)- improved symptoms by 30-40% but still limited ROM and soreness. 8-9/10 down to 4/10.   -Modalities: Ice  -Other Providers seen: JONAS Lombardo    Sports, Hobbies, Employment:  [employer]     Average hours of sleep per night: 6-8 hours, difficulty falling asleep and laying on the left side    Average minutes of exercise per day: 3 x Week [employer], Average 30-60 min/hour    Area of Problem  11/19/2024  Date 2 Date 3 Date 4 Date 5   Function Ability in last week - % of Baseline (0 is worst & 100 is best) 75%       Sport/Activity Ability in last week - % of Baseline (0 is worst & 100 is best) 75%       Pain Level in the last week (0 is best & 10 is worst) 8-9         Additional Information of consideration:  -Poor Balance? X  -Bowel/Bladder Incontinence? " X  -Numbness/paresthesias in extremities? X     MEDICATIONS    Current Outpatient Medications:      PARoxetine (PAXIL-CR) 12.5 MG 24 hr tablet, Take 1 tablet (12.5 mg) by mouth every morning., Disp: 90 tablet, Rfl: 3    ALLERGIES  Allergies   Allergen Reactions     Compazine [Prochlorperazine]      Dystonic jaw movements         PAST MEDICAL HISTORY  Past Medical History:   Diagnosis Date     Calculus of kidney 10/01/2006     Lumbar radiculopathy      Thyrotoxicosis without mention of goiter or other cause, without mention of thyrotoxic crisis or storm        PAST SURGICAL HISTORY  Past Surgical History:   Procedure Laterality Date     LAMINECTOMY LUMBAR ONE LEVEL Right 5/20/2024    Procedure: RIGHT LUMBAR FOUR TO LUMBAR FIVE HEMILAMINECTOMY, MEDIAL FACETECTOMY, FORAMINOTOMY AND MICRODISCECTOMY;  Surgeon: Viviana Sorensen MD;  Location: [facility] Main OR     SURGICAL HISTORY OF -   10/06    cystoscopy/ureteral stone extraction - dr herndon       SOCIAL HISTORY  Social History     Socioeconomic History     Marital status: Single     Spouse name: Not on file     Number of children: Not on file     Years of education: Not on file     Highest education level: Not on file   Occupational History     Not on file   Tobacco Use     Smoking status: Never     Passive exposure: Yes     Smokeless tobacco: Never     Tobacco comments:     Socially  1-2 per day   Vaping Use     Vaping status: Never Used   Substance and Sexual Activity     Alcohol use: No     Drug use: No     Sexual activity: Not on file   Other Topics Concern     Not on file   Social History Narrative     Not on file     Social Drivers of Health     Financial Resource Strain: Low Risk  (5/6/2024)    Financial Resource Strain      Within the past 12 months, have you or your family members you live with been unable to get utilities (heat, electricity) when it was really needed?: No   Food Insecurity: Low Risk  (5/6/2024)    Food Insecurity      Within the past 12  months, did you worry that your food would run out before you got money to buy more?: No      Within the past 12 months, did the food you bought just not last and you didn t have money to get more?: No   Transportation Needs: Low Risk  (5/6/2024)    Transportation Needs      Within the past 12 months, has lack of transportation kept you from medical appointments, getting your medicines, non-medical meetings or appointments, work, or from getting things that you need?: No   Physical Activity: Not on file   Stress: Not on file   Social Connections: Not on file   Interpersonal Safety: Low Risk  (5/8/2024)    Interpersonal Safety      Do you feel physically and emotionally safe where you currently live?: Yes      Within the past 12 months, have you been hit, slapped, kicked or otherwise physically hurt by someone?: No      Within the past 12 months, have you been humiliated or emotionally abused in other ways by your partner or ex-partner?: No   Housing Stability: Low Risk  (5/6/2024)    Housing Stability      Do you have housing? : Yes      Are you worried about losing your housing?: No       FAMILY HISTORY  Family History   Problem Relation Age of Onset     Family History Negative Unknown        REVIEW OF SYSTEMS  Complete 12 system Review of Systems performed and was negative except for HPI.    VITALS  There were no vitals filed for this visit.    PHYSICAL EXAMINATION   General: Age appropriate appearing, no acute distress  HEENT: normocephalic, atraumatic, sclera non-icteric  Skin: No open skin lesion noted in visible areas.  Respiratory: Non labored breathing, No wheezes.  Cardiac/Vessels: No edema, cyanosis, clubbing noted in all extremities.  Lymph: No palpable lymph node swelling noted around the affected area.  Mental: There was no signs of aberrant behaviors noted. Patient was pleasant throughout the encounter.    Functional Movements: Non-antalgic gait appreciated.      SHOULDER:   Inspection: No notable  "asymmetry appreciated upon exam bilaterally  Palpation: Mild TTP bicipital groove area.  No TTP SCJ, clavicle, ACJ, anterior and posterior glenohumeral joint area.    Range of Motion (Estimated, Active unless otherwise noted, L/R):   Flexion (normal = 170-180 degrees): normal/normal  Abduction (normal = 170-180 degrees): normal/normal  External Rotation (normal = 90 degrees): normal/normal  Internal Rotation: Reaching hands behind back, with right thumb reaches T4 & left thumb reached T4  Reflexes  [L (0-4) / R (0-4)]:  Biceps [2/2], Triceps [2/2], Brachioradialis [2/2]  Sensation:  Intact to light touch bilaterally at the shoulders, lateral elbow, medial elbow, dorsum of thumb, dorsum of 3rd digit, and dorsum of 5th digit.  Strength [L (0-5) / R (0-5)]:   Shoulder: Abduction [5/4*], External Rotation [5/4*], Internal Rotation [5/5]  *(infraspinatus)    Special Testing:   Neers (-)  Empty Can (+)  Lawson (+)  Speeds (+)  Scarf test (+)  O'hakan's (+)  Anterior apprehension test (-)  posterior dynamic shear (-)    CERVICAL SPINE  Special testing: left spurlings negative    IMAGING STUDIES:  10/17/2024 Left shoulder radiograph \"Normal joint spaces and alignment. No fracture. \"    I personally reviewed these images and agree with the radiology report and shared the findings with the patient.    IMPRESSION  Franco Raymond is a very pleasant 34 year old right-hand-dominant male user experience  who enjoys weight training and [employer] and is presenting today with left shoulder anterolateral pain most exacerbated with resisted external rotation, and with partial improvement following subacromial/subdeltoid bursa injection that seems related to rotator cuff (primarily subscapularis) tendinopathy.  He also has tenderness in the bicipital groove and positive speeds and O'briens concerning for bicipital tendinopathy/superior labral pathology.  He doesn't have clinical or radiographic findings of fracture, " osteoarthritis or other osseus abnormality.    PLAN  The following was discussed with the patient:  Activity Modification: As tolerated  Imaging/Tests: Previous radiographs reviewed as noted above.  MRI ordered  Rehabilitation: Continue physical therapy/Home exercises  Orthotics/Bracing: None recommended  Medication: No changes  Interventions: s/p subdeltoid bursa corticosteroid injection on 10/18/2024.  None recommended at this time  Referral: None at this time  Follow-up Plan: After MRI  Resources Provided: Written and verbal information detailing above findings and plan provided including after visit summary.    They were encouraged to message me on [employer] whenever they needed.    The patient was in agreement with this plan. All questions were answered to the best of my ability.    Total time (face-to-face and non-face-to-face) spent on today's visit was 40 minutes. This included preparation for the visit (i.e. reviewing test results), performance of a medically  appropriate history and examination, placing orders for medications, tests or other procedures, and discussing the plan of care with the patient. This time is exclusive of procedures performed and time spent teaching.     Martina Tamez MD, [facility]  Sports Medicine Attending Physician  Department of Physical Medicine & Rehabilitation       Again, thank you for allowing me to participate in the care of your patient.        Sincerely,        Martina Tamez MD

## 2024-11-19 NOTE — PATIENT INSTRUCTIONS
Franco Raymond, It was nice to see you in our office today.      DIAGNOSIS:   1. Chronic left shoulder pain      INSTRUCTIONS FOR FOLLOW-UP CARE:  Activity Status Recommended:Activity as tolerated, being guided by pain using the following simple  Traffic Light System  as it relates to tendon pain:  Green Light (0-3/10 pain): No increases in symptoms, you are OK to continue the activity, or perhaps increase load slightly.  Yellow light (4-6/10 pain): Minor increase in symptoms, but you can move normally within an hour of exercise, and pain reduces to normal within the next 24 hours. Proceed with caution, you can do minor bouts of loading, but too much will aggravate your symptoms and increase pain.  Red light (7-10/10 pain): Cease activity, as you have exceeded your tolerance. Generally, involves a significant increase in pain, which may not settle in the following 24 hours. Rest for 24-48 hours, allow symptoms to settle down, then begin gentle movement, and monitor your progression.  Imaging ordered: Previous radiographs reviewed.  MRI ordered  Rehabilitation: Continue physical therapy/Home exercises  Bracing/Orthotics: None recommended  US guided injections: None at this time  Referrals: None at this time  Medications: No changes  Follow up: After MRI      CLINIC LOCATIONS:     [place] [address], [address] TRIAGE LINE: [phone]   [place] [address] APPOINTMENTS: [phone]   (Monday & Friday) RADIOLOGY: [phone]    MRI/CT [phone]   [facility]: [phone] [address] BILLING QUESTIONS: [phone]   [place] [address] FAX: [phone]   (Tuesday & Wednesday)        Thank you for choosing [facility]!    If you have any questions, please do not hesitate to reach out on [employer] or Call [phone] and ask for my team.    Martina Tamez MD, [facility] and Sports Medicine

## 2024-12-10 ENCOUNTER — ANCILLARY PROCEDURE (OUTPATIENT)
Dept: MRI IMAGING | Facility: CLINIC | Age: 34
End: 2024-12-10
Attending: STUDENT IN AN ORGANIZED HEALTH CARE EDUCATION/TRAINING PROGRAM
Payer: COMMERCIAL

## 2024-12-10 ENCOUNTER — MYC MEDICAL ADVICE (OUTPATIENT)
Dept: ORTHOPEDICS | Facility: CLINIC | Age: 34
End: 2024-12-10

## 2024-12-10 DIAGNOSIS — G89.29 CHRONIC LEFT SHOULDER PAIN: ICD-10-CM

## 2024-12-10 DIAGNOSIS — M25.512 CHRONIC LEFT SHOULDER PAIN: ICD-10-CM

## 2024-12-10 PROCEDURE — 73221 MRI JOINT UPR EXTREM W/O DYE: CPT | Mod: LT

## 2024-12-16 NOTE — PROGRESS NOTES
SPORTS MEDICINE CLINIC FOLLOW-UP PATIENT VISIT    HISTORY OF PRESENT ILLNESS  Franco Raymond is a very pleasant 34 year old right-hand-dominant male user experience  who enjoys weight training and [email or web address] and is presenting today for follow-up of left shoulder anterolateral pain s/p likely palpation-guided subdeltoid bursa corticosteroid injection on 10/18/2024, with over 6 months rotator-cuff directed PT without improvement, he also has a new problem of right wrist pain.    Previous Visit (11/19/2024)   At last visit, previous radiographs were reviewed, MRI was ordered, he was advised to continue physical therapy/home exercises and follow-up after MRI.    Today (12/19/2024)  Since last visit, the shoulder has remained the same. Pain with certain motions, lifting. General soreness. Here to discuss MRI results and to evaluate new problem as follows:     NEW PROBLEM - Right Wrist/Hand Pain  When did problem start?/Trauma associated with onset?:  3-4 weeks; no injury/trauma, just since it got cold     Location & description of pain:  Wrist, pain along the 4th and 5th MCP     Exacerbating factors:   Putting weight on the hand, wrist extension, finger adduction     Remitting factors:  Perla taping, cloth wraps     Previous Treatments:  -Medications: None   -Rehabilitation: None   -Durable Medical Equipment: None   -Injections: None   -Modalities: perla taping   -Other Providers seen: None   -Hx: Hairline fx of right wrist when he was younger      Sports, Hobbies, Employment: [employer]      Average hours of sleep per night: 7     Average minutes of exercise per day: 3 x Week [employer], Average daily 30-60 min     Area of Problem  12/19/2024  Date 2 Date 3 Date 4 Date 5   Function Ability in last week - % of Baseline (0 is worst & 100 is best) 75%           Sport/Activity Ability in last week - % of Baseline (0 is worst & 100 is best) 75%           Pain Level in the last week (0 is best & 10 is worst) 0-1                Additional Information of consideration:  -Neck Pain?: No   -Numbness/paresthesias in extremities?: None   -Weakness?: No     MEDICATIONS    Current Outpatient Medications:     PARoxetine (PAXIL-CR) 12.5 MG 24 hr tablet, Take 1 tablet (12.5 mg) by mouth every morning., Disp: 90 tablet, Rfl: 3    ALLERGIES  Allergies   Allergen Reactions    Compazine [Prochlorperazine]      Dystonic jaw movements         PAST MEDICAL HISTORY  Past Medical History:   Diagnosis Date    Calculus of kidney 10/01/2006    Lumbar radiculopathy     Thyrotoxicosis without mention of goiter or other cause, without mention of thyrotoxic crisis or storm        PAST SURGICAL HISTORY  Past Surgical History:   Procedure Laterality Date    LAMINECTOMY LUMBAR ONE LEVEL Right 5/20/2024    Procedure: RIGHT LUMBAR FOUR TO LUMBAR FIVE HEMILAMINECTOMY, MEDIAL FACETECTOMY, FORAMINOTOMY AND MICRODISCECTOMY;  Surgeon: Viviana Sorensen MD;  Location: [facility] OR    SURGICAL HISTORY OF -   10/06    cystoscopy/ureteral stone extraction - dr herndon       SOCIAL HISTORY  Social History     Tobacco Use    Smoking status: Never     Passive exposure: Yes    Smokeless tobacco: Never    Tobacco comments:     Socially  1-2 per day   Vaping Use    Vaping status: Never Used   Substance Use Topics    Alcohol use: No    Drug use: No     FAMILY HISTORY  Family History   Problem Relation Age of Onset    Family History Negative Unknown        REVIEW OF SYSTEMS  Complete 12 system Review of Systems performed and was negative except for HPI.    VITALS  There were no vitals filed for this visit.    PHYSICAL EXAMINATION   General: Age appropriate appearing, no acute distress  HEENT: normocephalic, atraumatic, sclera non-icteric  Skin: No open skin lesion noted in visible areas.  Respiratory: Non labored breathing, No wheezes.  Cardiac/Vessels: No edema, cyanosis, clubbing noted in all extremities.  Lymph: No palpable lymph node swelling noted around the affected  "area.  Mental: There was no signs of aberrant behaviors noted. Patient was pleasant throughout the encounter.    Functional Movements: Non-antalgic gait appreciated.        PHYSICAL EXAMINATION   General: Age appropriate appearing, no acute distress  HEENT: normocephalic, atraumatic, sclera non-icteric  Skin: No open skin lesion noted in visible areas.  Respiratory: Non labored breathing, No wheezes.  Cardiac/Vessels: No edema, cyanosis, clubbing noted in all extremities.  Lymph: No palpable lymph node swelling noted around the affected area.  Mental: There was no signs of aberrant behaviors noted. Patient was pleasant throughout the encounter.    Functional Movements: Non-antalgic gait appreciated.      RIGHT WRIST/HAND:   Inspection: No discoloration noted around the wrist and hand bilaterally   Palpation: No TTP throughout volar and palmar wrist  Range of Motion able to make a full fist, flex and extend all fingers at the DIP and PIP against resistance - Pain in reproduced by DIP and PIP flexion at rights 4 and 5  Sensation:  Intact to light touch bilaterally at the shoulders, lateral elbow, medial elbow, dorsum of thumb, dorsum of 3rd digit, and dorsum of 5th digit.    Special Testing:   Resisted wrist ulnar and medial deviation (-)  TFCC Compression test (-)  Carpal compression test (-)    IMAGING STUDIES:  12/19/2024 Right wrist radiograph: \"Normal joint spaces and alignment. No fracture. \"    12/10/2024 Left shoulder MRI: \"1. Focal moderate grade articular sided tear of the supraspinatus/infraspinatus junctional fibers at the footprint. *  Rotator cuff muscle bulk is preserved.  2. Mild-to-moderate degenerative changes of the AC joint.  3. Suspected inferior labral tear.\"    10/17/2024 Left shoulder radiograph \"Normal joint spaces and alignment. No fracture. \"    I personally reviewed these images and agree with the radiology report and shared the findings with the patient.    IMPRESSION  Franco Raymond is a " very pleasant 34 year old right-hand-dominant male user experience  who enjoys weight training and [employer] and is presenting today for follow-up of left shoulder pain with clinical and MRI findings supportive of moderate supraspinatus/infraspinatus tear, AC joint OA and suspected inferior labral tear.  His new complaint of right ulnar-sided hand/wrist pain is consistent with digits 4 & 5 FDS/FDP tendinopathy, without clinical or radiographic findings of fracture or TFCC disruption.    PLAN  The following was discussed with the patient:  Activity Modification: As tolerated  Imaging/Tests: Right wrist radiograph and left shoulder MRI reviewed as noted above  Rehabilitation: Hand therapy recommended, referral placed  Orthotics/Bracing: Wrist brace recommended with activity  Medication: No changes  Interventions: None recommended at this time  Referral: Surgical referral placed     Follow-up Plan: 8 weeks  Resources Provided: Written and verbal information detailing above findings and plan provided including after visit summary.    They were encouraged to message me on [employer] whenever they needed.    The patient was in agreement with this plan. All questions were answered to the best of my ability.    Total time (face-to-face and non-face-to-face) spent on today's visit was 35 minutes. This included preparation for the visit (i.e. reviewing test results), performance of a medically  appropriate history and examination, placing orders for medications, tests or other procedures, and discussing the plan of care with the patient. This time is exclusive of procedures performed and time spent teaching.     Martina Tamez MD, [facility]  Sports Medicine Attending Physician  Department of Physical Medicine & Rehabilitation

## 2024-12-19 ENCOUNTER — OFFICE VISIT (OUTPATIENT)
Dept: ORTHOPEDICS | Facility: CLINIC | Age: 34
End: 2024-12-19
Attending: STUDENT IN AN ORGANIZED HEALTH CARE EDUCATION/TRAINING PROGRAM
Payer: COMMERCIAL

## 2024-12-19 VITALS — HEIGHT: 72 IN | BODY MASS INDEX: 24.65 KG/M2 | HEART RATE: 73 BPM | WEIGHT: 182 LBS | OXYGEN SATURATION: 97 %

## 2024-12-19 DIAGNOSIS — S66.819A STRAIN OF FLEXOR TENDON OF WRIST: Primary | ICD-10-CM

## 2024-12-19 DIAGNOSIS — M67.814 TENDINOSIS OF LEFT ROTATOR CUFF: ICD-10-CM

## 2024-12-19 DIAGNOSIS — M25.531 RIGHT WRIST PAIN: ICD-10-CM

## 2024-12-19 DIAGNOSIS — M65.949 FLEXOR TENOSYNOVITIS OF FINGER: ICD-10-CM

## 2024-12-19 NOTE — LETTER
12/19/2024      Franco Raymond  [address]  [facility] [address]      Dear Colleague,    Thank you for referring your patient, Franco Raymond, to the [facility] SPORTS MEDICINE CLINIC [place]. Please see a copy of my visit note below.    SPORTS MEDICINE CLINIC FOLLOW-UP PATIENT VISIT    HISTORY OF PRESENT ILLNESS  Franco Raymond is a very pleasant 34 year old right-hand-dominant male user experience  who enjoys weight training and [employer] and is presenting today for follow-up of left shoulder anterolateral pain s/p likely palpation-guided subdeltoid bursa corticosteroid injection on 10/18/2024, with over 6 months rotator-cuff directed PT without improvement, he also has a new problem of right wrist pain.    Previous Visit (11/19/2024)   At last visit, previous radiographs were reviewed, MRI was ordered, he was advised to continue physical therapy/home exercises and follow-up after MRI.    Today (12/19/2024)  Since last visit, the shoulder has remained the same. Pain with certain motions, lifting. General soreness. Here to discuss MRI results and to evaluate new problem as follows:     NEW PROBLEM - Right Wrist/Hand Pain  When did problem start?/Trauma associated with onset?:  3-4 weeks; no injury/trauma, just since it got cold     Location & description of pain:  Wrist, pain along the 4th and 5th MCP     Exacerbating factors:   Putting weight on the hand, wrist extension, finger adduction     Remitting factors:  Perla taping, cloth wraps     Previous Treatments:  -Medications: None   -Rehabilitation: None   -Durable Medical Equipment: None   -Injections: None   -Modalities: perla taping   -Other Providers seen: None   -Hx: Hairline fx of right wrist when he was younger      Sports, Hobbies, Employment: [employer]      Average hours of sleep per night: 7     Average minutes of exercise per day: 3 x Week [employer], Average daily 30-60 min     Area of Problem  12/19/2024  Date 2 Date 3 Date 4  Date 5   Function Ability in last week - % of Baseline (0 is worst & 100 is best) 75%           Sport/Activity Ability in last week - % of Baseline (0 is worst & 100 is best) 75%           Pain Level in the last week (0 is best & 10 is worst) 0-1               Additional Information of consideration:  -Neck Pain?: No   -Numbness/paresthesias in extremities?: None   -Weakness?: No     MEDICATIONS    Current Outpatient Medications:      PARoxetine (PAXIL-CR) 12.5 MG 24 hr tablet, Take 1 tablet (12.5 mg) by mouth every morning., Disp: 90 tablet, Rfl: 3    ALLERGIES  Allergies   Allergen Reactions     Compazine [Prochlorperazine]      Dystonic jaw movements         PAST MEDICAL HISTORY  Past Medical History:   Diagnosis Date     Calculus of kidney 10/01/2006     Lumbar radiculopathy      Thyrotoxicosis without mention of goiter or other cause, without mention of thyrotoxic crisis or storm        PAST SURGICAL HISTORY  Past Surgical History:   Procedure Laterality Date     LAMINECTOMY LUMBAR ONE LEVEL Right 5/20/2024    Procedure: RIGHT LUMBAR FOUR TO LUMBAR FIVE HEMILAMINECTOMY, MEDIAL FACETECTOMY, FORAMINOTOMY AND MICRODISCECTOMY;  Surgeon: Viviana Sorensen MD;  Location: [facility] [place]     SURGICAL HISTORY OF -   10/06    cystoscopy/ureteral stone extraction - dr herndon       SOCIAL HISTORY  Social History     Tobacco Use     Smoking status: Never     Passive exposure: Yes     Smokeless tobacco: Never     Tobacco comments:     Socially  1-2 per day   Vaping Use     Vaping status: Never Used   Substance Use Topics     Alcohol use: No     Drug use: No     FAMILY HISTORY  Family History   Problem Relation Age of Onset     Family History Negative Unknown        REVIEW OF SYSTEMS  Complete 12 system Review of Systems performed and was negative except for HPI.    VITALS  There were no vitals filed for this visit.    PHYSICAL EXAMINATION   General: Age appropriate appearing, no acute distress  HEENT: normocephalic,  "atraumatic, sclera non-icteric  Skin: No open skin lesion noted in visible areas.  Respiratory: Non labored breathing, No wheezes.  Cardiac/Vessels: No edema, cyanosis, clubbing noted in all extremities.  Lymph: No palpable lymph node swelling noted around the affected area.  Mental: There was no signs of aberrant behaviors noted. Patient was pleasant throughout the encounter.    Functional Movements: Non-antalgic gait appreciated.        PHYSICAL EXAMINATION   General: Age appropriate appearing, no acute distress  HEENT: normocephalic, atraumatic, sclera non-icteric  Skin: No open skin lesion noted in visible areas.  Respiratory: Non labored breathing, No wheezes.  Cardiac/Vessels: No edema, cyanosis, clubbing noted in all extremities.  Lymph: No palpable lymph node swelling noted around the affected area.  Mental: There was no signs of aberrant behaviors noted. Patient was pleasant throughout the encounter.    Functional Movements: Non-antalgic gait appreciated.      RIGHT WRIST/HAND:   Inspection: No discoloration noted around the wrist and hand bilaterally   Palpation: No TTP throughout volar and palmar wrist  Range of Motion able to make a full fist, flex and extend all fingers at the DIP and PIP against resistance - Pain in reproduced by DIP and PIP flexion at rights 4 and 5  Sensation:  Intact to light touch bilaterally at the shoulders, lateral elbow, medial elbow, dorsum of thumb, dorsum of 3rd digit, and dorsum of 5th digit.    Special Testing:   Resisted wrist ulnar and medial deviation (-)  TFCC Compression test (-)  Carpal compression test (-)    IMAGING STUDIES:  12/19/2024 Right wrist radiograph: \"Normal joint spaces and alignment. No fracture. \"    12/10/2024 Left shoulder MRI: \"1. Focal moderate grade articular sided tear of the supraspinatus/infraspinatus junctional fibers at the footprint. *  Rotator cuff muscle bulk is preserved.  2. Mild-to-moderate degenerative changes of the AC joint.  3. " "Suspected inferior labral tear.\"    10/17/2024 Left shoulder radiograph \"Normal joint spaces and alignment. No fracture. \"    I personally reviewed these images and agree with the radiology report and shared the findings with the patient.    IMPRESSION  Franco Raymond is a very pleasant 34 year old right-hand-dominant male user experience  who enjoys weight training and [employer] and is presenting today for follow-up of left shoulder pain with clinical and MRI findings supportive of moderate supraspinatus/infraspinatus tear, AC joint OA and suspected inferior labral tear.  His new complaint of right ulnar-sided hand/wrist pain is consistent with digits 4 & 5 FDS/FDP tendinopathy, without clinical or radiographic findings of fracture or TFCC disruption.    PLAN  The following was discussed with the patient:  Activity Modification: As tolerated  Imaging/Tests: Right wrist radiograph and left shoulder MRI reviewed as noted above  Rehabilitation: Hand therapy recommended, referral placed  Orthotics/Bracing: Wrist brace recommended with activity  Medication: No changes  Interventions: None recommended at this time  Referral: Surgical referral placed     Follow-up Plan: 8 weeks  Resources Provided: Written and verbal information detailing above findings and plan provided including after visit summary.    They were encouraged to message me on [facility] whenever they needed.    The patient was in agreement with this plan. All questions were answered to the best of my ability.    Total time (face-to-face and non-face-to-face) spent on today's visit was 35 minutes. This included preparation for the visit (i.e. reviewing test results), performance of a medically  appropriate history and examination, placing orders for medications, tests or other procedures, and discussing the plan of care with the patient. This time is exclusive of procedures performed and time spent teaching.     Martina Tamez MD, " CAQSM  Sports Medicine Attending Physician  Department of Physical Medicine & Rehabilitation          Again, thank you for allowing me to participate in the care of your patient.        Sincerely,        Martina Tamez MD

## 2024-12-19 NOTE — PATIENT INSTRUCTIONS
Franco Raymond, It was nice to see you in our office today.      DIAGNOSIS:   1. Strain of flexor tendon of wrist    2. Right wrist pain    3. Flexor tenosynovitis of finger    4. Tendinosis of left rotator cuff      INSTRUCTIONS FOR FOLLOW-UP CARE:  Activity Modification: Activity as tolerated, being guided by pain using the following simple  Traffic Light System  as it relates to tendon pain:  Green Light (0-3/10 pain): No increases in symptoms, you are OK to continue the activity, or perhaps increase load slightly.  Yellow light (4-6/10 pain): Minor increase in symptoms, but you can move normally within an hour of exercise, and pain reduces to normal within the next 24 hours. Proceed with caution, you can do minor bouts of loading, but too much will aggravate your symptoms and increase pain.  Red light (7-10/10 pain): Cease activity, as you have exceeded your tolerance. Generally, involves a significant increase in pain, which may not settle in the following 24 hours. Rest for 24-48 hours, allow symptoms to settle down, then begin gentle movement, and monitor your progression.  Imaging/Tests: Right wrist radiograph and left shoulder MRI Reviewed  Rehabilitation: Hand therapy recommended, referral placed  Orthotics/Bracing: Wrist brace recommended with activity  Medication: No changes  Interventions: None recommended at this time  Referral: Surgical referral to Dr. Jacky walters for rotator cuff  Follow-up Plan: 8 weeks    CLINIC LOCATIONS:     [facility]:  [phone] [address], [address] TRIAGE LINE: [phone]   [place] [address] APPOINTMENTS: [phone]   (Monday & Friday) RADIOLOGY: [phone]    MRI/CT [phone]   [facility]: [phone] [address] BILLING QUESTIONS: [phone]   [place] [address] FAX: [phone]   (Tuesday & Thursday)        Thank you for choosing [facility]!    If you have any questions, please do not hesitate to reach out on [employer] or Call [phone] and ask for my team.    Martina Tamez MD, [facility]

## 2024-12-19 NOTE — PROGRESS NOTES
SPORTS MEDICINE CLINIC NEW PATIENT VISIT    REFERRAL SOURCE: Martina Tamez    PATIENT'S GOAL FOR APPOINTMENT: Treatment options     HISTORY OF PRESENT ILLNESS  Franco Raymond is a 34 year old Right-handed male presenting as a new patient with right wrist/hand pain    When did problem start?/Trauma associated with onset?:  3-4 weeks   Location & description of pain:  Wrist, pain along the 4th and 5th MCP   Exacerbating factors:   Putting weight on the hand, wrist extension, finger adduction   Remitting factors:  Perla taping, cloth wraps   Previous Treatments:  -Medications: None   -Rehabilitation: None   -Durable Medical Equipment: None   -Injections: None   -Modalities: perla taping   -Other Providers seen: None   -Hx: Hairline fx of right wrist when he was younger     Sports, Hobbies, Employment:    Average hours of sleep per night: 7     Average minutes of exercise per day: Daily 30-60 minutes     Area of Problem  12/19/2024  Date 2 Date 3 Date 4 Date 5   Function Ability in last week - % of Baseline (0 is worst & 100 is best) 75%       Sport/Activity Ability in last week - % of Baseline (0 is worst & 100 is best) 75%       Pain Level in the last week (0 is best & 10 is worst) 0-1          Additional Information of consideration:  -Neck Pain?: No   -Numbness/paresthesias in extremities?: None   -Weakness?: No     MEDICATIONS    Current Outpatient Medications:     PARoxetine (PAXIL-CR) 12.5 MG 24 hr tablet, Take 1 tablet (12.5 mg) by mouth every morning., Disp: 90 tablet, Rfl: 3    ALLERGIES  Allergies   Allergen Reactions    Compazine [Prochlorperazine]      Dystonic jaw movements         PAST MEDICAL HISTORY  Past Medical History:   Diagnosis Date    Calculus of kidney 10/01/2006    Lumbar radiculopathy     Thyrotoxicosis without mention of goiter or other cause, without mention of thyrotoxic crisis or storm        PAST SURGICAL HISTORY  Past Surgical History:   Procedure Laterality Date    LAMINECTOMY LUMBAR  ONE LEVEL Right 5/20/2024    Procedure: RIGHT LUMBAR FOUR TO LUMBAR FIVE HEMILAMINECTOMY, MEDIAL FACETECTOMY, FORAMINOTOMY AND MICRODISCECTOMY;  Surgeon: Viviana Sorensen MD;  Location: [facility] [place]    SURGICAL HISTORY OF -   10/06    cystoscopy/ureteral stone extraction - dr herndon       SOCIAL HISTORY  Social History     Tobacco Use    Smoking status: Never     Passive exposure: Yes    Smokeless tobacco: Never    Tobacco comments:     Socially  1-2 per day   Vaping Use    Vaping status: Never Used   Substance Use Topics    Alcohol use: No    Drug use: No       FAMILY HISTORY  Family History   Problem Relation Age of Onset    Family History Negative Unknown        REVIEW OF SYSTEMS  Complete 12 system Review of Systems performed and was negative except for HPI.    VITALS  Vitals:    12/19/24 0949   Weight: 82.6 kg (182 lb)       PHYSICAL EXAMINATION ***  General: Age appropriate appearing, no acute distress  HEENT: normocephalic, atraumatic, sclera non-icteric  Skin: No open skin lesion noted in visible areas.  Respiratory: Non labored breathing, No wheezes.  Cardiac/Vessels: No edema, cyanosis, clubbing noted in all extremities.  Lymph: No palpable lymph node swelling noted around the affected area.  Mental: There was no signs of aberrant behaviors noted. Patient was pleasant throughout the encounter.    Functional Movements: Non-antalgic gait appreciated.      WRIST/HAND:   Inspection: No discoloration noted around the wrist and hand bilaterally ***  Palpation: ***  Range of Motion able to make a full fist, flex and extend all fingers at the DIP and PIP against resistance, retropulse thumb and abduct all fingers against resistance   Reflexes  [L (0-4) / R (0-4)]:  Biceps [2/2], Triceps [2/2], Brachioradialis [2/2]  Sensation:  Intact to light touch bilaterally at the shoulders, lateral elbow, medial elbow, dorsum of thumb, dorsum of 3rd digit, and dorsum of 5th digit.  Strength [L (0-5) / R (0-5)]:  "  Shoulder: Abduction [5/5], External Rotation [5/5], Internal Rotation [5/5]  Elbow: Flexion [5/5], Extension [5/5]  Wrist: Extension [5/5], Flexion [5/5]  Fingers: Abduction [5/5],  [5/5]    Special Testin st CMC Grind test (-)  De Quervain's test (-)  Lunotriquetral ballotment test (-)  SL Instability  TTP over dorsal test (-)  SL Balottement test (-)  Scaphoid Shift Test (lazcano test) -Ulnar to radial movement of wrist (-)  Carpal Tunnel  Phalen test  Tinel at wrist  TFCC  -Fovea sign (-)  -Soft spot between ulnar styloid and FCU  -DRUJ Shuck (-)  -TFCC Compression test (-)  -ECU Subluxation (-)  Gamekeeper's thumb stress test (-)  Boutonniere test (-)  Mallet finger test (-)  Ulnar Froment sign (-)    CERVICAL SPINE  Inspection- Head forward, rounded shoulder posture.  Palpation- No tenderness to palpation over upper trapezius muscles, cervical spinous processes or paraspinal muscles bilaterally  ROM-  Range of motion is active (patient performed) unless noted  Forward flexion (normal = 90 degrees): normal  Extension (normal = 30 degrees): normal  Left/Right  Sidebend (normal = 30 degrees): normal/normal  Rotation (normal = 30 degrees): normal/normal    Special Testing:   Spurling maneuver (-)  Upper extremity dural tension sign (Ulnar, Radial, Median) (-)  Lhermitte sign (-)    IMAGING STUDIES:  Put Date here *** XR/MRI,etc    I personally reviewed these images and agree with the radiology report and shared the findings with the patient.    IMPRESSION  Franco Raymond is a very pleasant 34 year old {SIDE:5002::\"right\",\"left\",\"***\"}-hand-dominant male who is presenting today with *** wrist/hand pain that seems related to ***    PLAN  The following was discussed with the patient:  Activity Modification: ***  Imaging/Tests: ***  Rehabilitation: ***  Orthotics/Bracing: ***  Medication: ***  Interventions: ***  Referral: ***  Follow-up Plan: ***  Resources Provided: ***    They were encouraged to message " me on [facility] MyChart whenever they needed.    The patient was in agreement with this plan. All questions were answered to the best of my ability.    Martina Tamez MD, [facility]  Sports Medicine Attending Physician  Department of Physical Medicine & Rehabilitation

## 2025-01-06 ENCOUNTER — OFFICE VISIT (OUTPATIENT)
Dept: ORTHOPEDICS | Facility: CLINIC | Age: 35
End: 2025-01-06
Attending: STUDENT IN AN ORGANIZED HEALTH CARE EDUCATION/TRAINING PROGRAM
Payer: COMMERCIAL

## 2025-01-06 DIAGNOSIS — M67.814 TENDINOSIS OF LEFT ROTATOR CUFF: ICD-10-CM

## 2025-01-06 PROCEDURE — 99204 OFFICE O/P NEW MOD 45 MIN: CPT | Mod: GC | Performed by: STUDENT IN AN ORGANIZED HEALTH CARE EDUCATION/TRAINING PROGRAM

## 2025-01-06 NOTE — PROGRESS NOTES
CC: left shoulder pain     HPI: 33 yo male with left shoulder pain for the past 8 months. He denies any specific injury or trauma to the shoulder but does feel that it was worsened during his [employer] class. He locates the pain to the anterior and anterolateral aspect of his shoulder. He endorses night time pain. He has increased pain with abduction and internal rotation of his shoulder. He has been taking ibuprofen for pain control. He has done PT in the past with minimal improvement. He had a intra-articular corticosteroid injection in October with minimal relief.  He has never had any prior surgery to the shoulder    He is otherwise healthy.  He is not take any medication for diabetes.  Last hemoglobin A1c is 5.4.  He does not smoke.  He enjoys [employer] for activity.         Patient Active Problem List   Diagnosis    Calculus of kidney    Thyrotoxicosis    Elevated liver enzymes    Subclinical hypothyroidism    Chronic left shoulder pain    Generalized anxiety disorder    Major depressive disorder, single episode, moderate (H)          Past Medical History:   Diagnosis Date    Calculus of kidney 10/01/2006    Lumbar radiculopathy     Thyrotoxicosis without mention of goiter or other cause, without mention of thyrotoxic crisis or storm           Past Surgical History:   Procedure Laterality Date    LAMINECTOMY LUMBAR ONE LEVEL Right 5/20/2024    Procedure: RIGHT LUMBAR FOUR TO LUMBAR FIVE HEMILAMINECTOMY, MEDIAL FACETECTOMY, FORAMINOTOMY AND MICRODISCECTOMY;  Surgeon: Viviana Sorensen MD;  Location: [facility] [place]    SURGICAL HISTORY OF -   10/06    cystoscopy/ureteral stone extraction - dr herndon          Current Outpatient Medications   Medication Sig Dispense Refill    PARoxetine (PAXIL-CR) 12.5 MG 24 hr tablet Take 1 tablet (12.5 mg) by mouth every morning. 90 tablet 3          Allergies   Allergen Reactions    Compazine [Prochlorperazine]      Dystonic jaw movements            Family History   Problem  Relation Age of Onset    Family History Negative Unknown           Social History     Tobacco Use    Smoking status: Never     Passive exposure: Yes    Smokeless tobacco: Never    Tobacco comments:     Socially  1-2 per day   Substance Use Topics    Alcohol use: No            Objective:  Physical Exam:  LUE: No gross deformity of the shoulder.  No open wounds or lacerations.  No surgical incisions.  No erythema or ecchymosis.  No pain to palpation over the clavicle, AC joint, acromion, or scapular spine.  No pain to palpation over the posterior joint line.pain to palpation over the anteriolateral aspect of the shoulder. Pain with palpation along long head of biceps tendon.  Passive range of motion 180 degrees forward flexion, 170 degrees abduction, 90 degrees external rotation, L1 internal rotation.  Active range of motion is equivalent to passive range of motion.  5/5 strength in flexion, abduction, internal rotation and 4/5 strength external rotation.  Negative cross body for AC joint pain.  Positive Bolivar's for pain in deep anterior shoulder.  Positive speeds for pain over the bicipital groove.  Positive Jobes for pain.  No apprehension with abduction and external rotation.  Negative Yanique's test for apprehension or mechanical symptoms in the posterior shoulder.  Sensation intact to axillary, radial, median, and ulnar nerve distribution.    Imaging:  XR left shoulder demonstrates no acute fracture or dislocation.  No    MRI left shoulder demonstrates moderate grade articular sided tearing of the supraspinatus/infraspinatus junction. Mild degenerative changes of the AC joint.  There is also thinning of the biceps tendon as it exits the bicipital groove    Assessment and Plan: 33 yo male with left rotator cuff tear involving the supraspinatus and infraspinatus and biceps tendinosis. We discussed treatment options including both conservative and surgical management. We discussed conservative management in the form  of oral/topical anti-inflammatories, activity modification, physical therapy and injections. We discussed surgery in the form of arthroscopic rotator cuff repair and biceps tenodesis. The patient is interested in surgical management at this time which I think is reasonable. We discussed the risks and benefits of surgery. All questions answered.    Patient seen with Dr. Stout.     Hesham Garcia MD PGY5    I have reviewed and agree with the plan.  Patient is a 34-year-old male presenting here with several months of anterior and anterior lateral shoulder pain.  He is trialed physical therapy, activity modification, oral anti-inflammatory medication, and intra-articular glenohumeral injection.  He continues to have lateral and anterior shoulder pain.  X-rays show high-grade tearing of the supraspinatus infraspinatus junction as well as thickening of the long of the biceps as it exits the bicipital groove.  On clinical exam he is significant pain over the bicipital groove.  I definitely review his imaging with him today.  Discussed treatment options.  We discussed continued nonoperative management the form of oral anti-inflammatory medication, activity modification, continued physical therapy, and the role of an ultrasound-guided bicipital groove injection.  We discussed treatment options in the form of left shoulder arthroscopic rotator cuff repair with open subpectoralis biceps tenodesis versus tenotomy.  I described the operative technique of a rotator cuff repair.  I described the operative technique differences of open subpectoralis biceps tenodesis and biceps tenotomy.  I described the cosmetic difference between tenotomy and tenodesis.  We discussed the postoperative protocol after rotator cuff repair, including 4 to 6 weeks in a sling.  We discussed the risk and benefits of operative intervention clued but not limited to bleeding, infection, failure to cure pain, stiffness, posttraumatic arthritis, reinjury of  the rotator cuff tendons, loss of function, loss of limb and loss of life.  I had the opportunity to answer his questions.    At this time he feels he is trialed exhausted nonoperative management.  His pain is keeping him from performing his desired activities.  He would like to move forward with left shoulder arthroscopy he, rotator cuff repair, and open subpectoralis biceps tenodesis.  I think is a very reasonable option.  Will get him scheduled for this.    Jacky Stout MD    [facility]   Department of Orthopedic Surgery      Disclaimer: This note consists of symbols derived from keyboarding, dictation and/or voice recognition software. As a result, there may be errors in the script that have gone undetected. Please consider this when interpreting information found in this chart.

## 2025-01-06 NOTE — LETTER
1/6/2025      Franco Raymond  [address]  [facility] [address]      Dear Colleague,    Thank you for referring your patient, Franco Raymond, to the [facility] [place]. Please see a copy of my visit note below.    CC: left shoulder pain     HPI: 33 yo male with left shoulder pain for the past 8 months. He denies any specific injury or trauma to the shoulder but does feel that it was worsened during his [employer] class. He locates the pain to the anterior and anterolateral aspect of his shoulder. He endorses night time pain. He has increased pain with abduction and internal rotation of his shoulder. He has been taking ibuprofen for pain control. He has done PT in the past with minimal improvement. He had a intra-articular corticosteroid injection in October with minimal relief.  He has never had any prior surgery to the shoulder    He is otherwise healthy.  He is not take any medication for diabetes.  Last hemoglobin A1c is 5.4.  He does not smoke.  He enjoys [employer] for activity.         Patient Active Problem List   Diagnosis     Calculus of kidney     Thyrotoxicosis     Elevated liver enzymes     Subclinical hypothyroidism     Chronic left shoulder pain     Generalized anxiety disorder     Major depressive disorder, single episode, moderate (H)          Past Medical History:   Diagnosis Date     Calculus of kidney 10/01/2006     Lumbar radiculopathy      Thyrotoxicosis without mention of goiter or other cause, without mention of thyrotoxic crisis or storm           Past Surgical History:   Procedure Laterality Date     LAMINECTOMY LUMBAR ONE LEVEL Right 5/20/2024    Procedure: RIGHT LUMBAR FOUR TO LUMBAR FIVE HEMILAMINECTOMY, MEDIAL FACETECTOMY, FORAMINOTOMY AND MICRODISCECTOMY;  Surgeon: Viviana Sorensen MD;  Location: [facility] OR     SURGICAL HISTORY OF -   10/06    cystoscopy/ureteral stone extraction - dr herndon          Current Outpatient Medications   Medication Sig  Dispense Refill     PARoxetine (PAXIL-CR) 12.5 MG 24 hr tablet Take 1 tablet (12.5 mg) by mouth every morning. 90 tablet 3          Allergies   Allergen Reactions     Compazine [Prochlorperazine]      Dystonic jaw movements            Family History   Problem Relation Age of Onset     Family History Negative Unknown           Social History     Tobacco Use     Smoking status: Never     Passive exposure: Yes     Smokeless tobacco: Never     Tobacco comments:     Socially  1-2 per day   Substance Use Topics     Alcohol use: No            Objective:  Physical Exam:  LUE: No gross deformity of the shoulder.  No open wounds or lacerations.  No surgical incisions.  No erythema or ecchymosis.  No pain to palpation over the clavicle, AC joint, acromion, or scapular spine.  No pain to palpation over the posterior joint line.pain to palpation over the anteriolateral aspect of the shoulder. Pain with palpation along long head of biceps tendon.  Passive range of motion 180 degrees forward flexion, 170 degrees abduction, 90 degrees external rotation, L1 internal rotation.  Active range of motion is equivalent to passive range of motion.  5/5 strength in flexion, abduction, internal rotation and 4/5 strength external rotation.  Negative cross body for AC joint pain.  Positive Knox's for pain in deep anterior shoulder.  Positive speeds for pain over the bicipital groove.  Positive Jobes for pain.  No apprehension with abduction and external rotation.  Negative Yanique's test for apprehension or mechanical symptoms in the posterior shoulder.  Sensation intact to axillary, radial, median, and ulnar nerve distribution.    Imaging:  XR left shoulder demonstrates no acute fracture or dislocation.  No    MRI left shoulder demonstrates moderate grade articular sided tearing of the supraspinatus/infraspinatus junction. Mild degenerative changes of the AC joint.  There is also thinning of the biceps tendon as it exits the bicipital  groove    Assessment and Plan: 33 yo male with left rotator cuff tear involving the supraspinatus and infraspinatus and biceps tendinosis. We discussed treatment options including both conservative and surgical management. We discussed conservative management in the form of oral/topical anti-inflammatories, activity modification, physical therapy and injections. We discussed surgery in the form of arthroscopic rotator cuff repair and biceps tenodesis. The patient is interested in surgical management at this time which I think is reasonable. We discussed the risks and benefits of surgery. All questions answered.    Patient seen with Dr. Stout.     Hesham Garcia MD PGY5    I have reviewed and agree with the plan.  Patient is a 34-year-old male presenting here with several months of anterior and anterior lateral shoulder pain.  He is trialed physical therapy, activity modification, oral anti-inflammatory medication, and intra-articular glenohumeral injection.  He continues to have lateral and anterior shoulder pain.  X-rays show high-grade tearing of the supraspinatus infraspinatus junction as well as thickening of the long of the biceps as it exits the bicipital groove.  On clinical exam he is significant pain over the bicipital groove.  I definitely review his imaging with him today.  Discussed treatment options.  We discussed continued nonoperative management the form of oral anti-inflammatory medication, activity modification, continued physical therapy, and the role of an ultrasound-guided bicipital groove injection.  We discussed treatment options in the form of left shoulder arthroscopic rotator cuff repair with open subpectoralis biceps tenodesis versus tenotomy.  I described the operative technique of a rotator cuff repair.  I described the operative technique differences of open subpectoralis biceps tenodesis and biceps tenotomy.  I described the cosmetic difference between tenotomy and tenodesis.  We  discussed the postoperative protocol after rotator cuff repair, including 4 to 6 weeks in a sling.  We discussed the risk and benefits of operative intervention clued but not limited to bleeding, infection, failure to cure pain, stiffness, posttraumatic arthritis, reinjury of the rotator cuff tendons, loss of function, loss of limb and loss of life.  I had the opportunity to answer his questions.    At this time he feels he is trialed exhausted nonoperative management.  His pain is keeping him from performing his desired activities.  He would like to move forward with left shoulder arthroscopy he, rotator cuff repair, and open subpectoralis biceps tenodesis.  I think is a very reasonable option.  Will get him scheduled for this.    Jacky Stout MD    [facility]   Department of Orthopedic Surgery      Disclaimer: This note consists of symbols derived from keyboarding, dictation and/or voice recognition software. As a result, there may be errors in the script that have gone undetected. Please consider this when interpreting information found in this chart.        Again, thank you for allowing me to participate in the care of your patient.        Sincerely,        Jacky Stout MD    Electronically signed

## 2025-01-06 NOTE — PATIENT INSTRUCTIONS
[facility]   [address], [address]  [place] [address] [address] [place] [address]   Appointments: [phone] Appointments: [phone]   Fax: [phone] Fax: [phone]       1. Tendinosis of left rotator cuff          SURGERY:  Schedule  surgery.   The Surgery Scheduler will contact you to assist with scheduling surgery.   You can contact her directly at [phone].       A pre-operative Physical with your primary care physician is required within 30 days of your scheduled proceedure  Physical Therapy will be scheduled    CPM AND POLAR THERAPY:  To obtain CPM (continuous passive motion) machine, or Polar Therapy unit, please contact my office to obtain orders.   These items will be dispensed by [employer].  Their direct number is [phone].     FORMS:   If you are needing any forms completed relating to your upcoming procedure, please send them to our office with a completed Release of Information.   Forms will be completed AFTER your procedure. A letter can be sent to your employer prior to surgery to inform them of your anticipated time off.    Please notify our staff if you would like a letter to do so.   Forms can be faxed directly to our clinic at [phone].     DO NOT BRING FORMS ON THE DATE OF SURGERY.         Call my office with any questions or concerns, [phone].

## 2025-01-14 ENCOUNTER — MYC MEDICAL ADVICE (OUTPATIENT)
Dept: ORTHOPEDICS | Facility: CLINIC | Age: 35
End: 2025-01-14
Payer: COMMERCIAL

## 2025-01-14 DIAGNOSIS — M75.122 NONTRAUMATIC COMPLETE TEAR OF ROTATOR CUFF, LEFT: ICD-10-CM

## 2025-01-14 DIAGNOSIS — Z98.890 S/P LEFT ROTATOR CUFF REPAIR: Primary | ICD-10-CM

## 2025-01-22 ENCOUNTER — THERAPY VISIT (OUTPATIENT)
Dept: OCCUPATIONAL THERAPY | Facility: CLINIC | Age: 35
End: 2025-01-22
Attending: STUDENT IN AN ORGANIZED HEALTH CARE EDUCATION/TRAINING PROGRAM
Payer: COMMERCIAL

## 2025-01-22 DIAGNOSIS — M25.531 RIGHT WRIST PAIN: ICD-10-CM

## 2025-01-22 DIAGNOSIS — M65.949 FLEXOR TENOSYNOVITIS OF FINGER: ICD-10-CM

## 2025-01-22 DIAGNOSIS — S66.819A STRAIN OF FLEXOR TENDON OF WRIST: ICD-10-CM

## 2025-01-22 PROCEDURE — 97530 THERAPEUTIC ACTIVITIES: CPT | Mod: GO | Performed by: OCCUPATIONAL THERAPIST

## 2025-01-22 PROCEDURE — 97165 OT EVAL LOW COMPLEX 30 MIN: CPT | Mod: GO | Performed by: OCCUPATIONAL THERAPIST

## 2025-01-22 PROCEDURE — 97110 THERAPEUTIC EXERCISES: CPT | Mod: GO | Performed by: OCCUPATIONAL THERAPIST

## 2025-01-22 NOTE — PROGRESS NOTES
OCCUPATIONAL THERAPY EVALUATION  Type of Visit: Evaluation              Subjective        Presenting condition or subjective complaint: Sprained right wrist. Torn Rotator cuff.  Date of onset: 12/19/24 (Referral)    Relevant medical history:     Dates & types of surgery:      Patient comes to therapy today for evaluation and treatment of right-sided wrist pain. 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 Hurts primarily with shaking hands, twisting caps, etc. He has utilized a wrist brace intermittently while out and about with some relief, also feels there is some spontaneous recovery.  Upcoming rotator cuff repair on the right scheduled with Dr. Stout in February.     Per Dr. 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  His new complaint of right ulnar-sided hand/wrist pain is consistent with digits 4 & 5 FDS/FDP tendinopathy, without clinical or radiographic findings of fracture or TFCC disruption.     Upcoming rotator cuff repair on the right scheduled with Dr. Stout in February.     Prior diagnostic imaging/testing results: MRI; X-ray     Prior therapy history for the same diagnosis, illness or injury: No      Prior Level of Function  Transfers: Independent  Ambulation: Independent  ADL: Independent  IADL: Driving, Finances, Housekeeping, Laundry, Meal preparation, Medication management, Work, Yard work    Living Environment  Social support: With a significant other or spouse   Type of home: House   Stairs to enter the home: Yes 5 Is there a railing: Yes     Ramp: No   Stairs inside the home: Yes       Help at home: None  Equipment owned:        Employment: Yes    Hobbies/Interests: [employer]    Patient goals for therapy: Shake hands. Check my watch.       Objective   ADDITIONAL HISTORY:  Right hand dominant  Patient reports symptoms of pain, stiffness/loss of motion, and weakness/loss of strength  Transportation: drives  Currently working in normal job without restrictions    Functional Outcome Measure:   Upper Extremity Functional Index Score:      (A lower score indicates greater disability.)    PAIN:  Pain Level at Rest: 1/10  Pain Level with Use: 6/10  Pain Location: Right ulnar wrist  Pain Quality: Aching and Sharp  Pain Frequency: intermittent  Pain is Worst: daytime  Pain is Exacerbated By: Axial load, weightbearing on extended wrist, shaking hands, opening caps  Pain is Relieved By: rest and orthotic wear.   Pain Progression: Improved    POSTURE: Normal     EDEMA: None     SENSATION: WNL throughout all nerve distributions; per patient report     ROM:  AROM of the bilateral elbow, FA, wrist, thumb and digits are grossly WNL bilaterally.     SPECIAL TESTS:   Ulnar Dorsal Zone Left Right   Radiocarpal P/S (TFCC--stab f/a, rotate with some compression) NT NT   Ballottement II P/S (TFCC--stab hand/wrist, pt p/s) NT ++   TFCC load Test (UD, axially load wrist c volar/dorsal mvmt) NT ++   UMTDG test (TFCC--approximate the pisotriquetral and ulna) NT NT   Piano Key Sign (DRUJ) Negative Negative   Piano Key Test (DRUJ--distal ulna moved in pro/sup) Negative Negative   Ballottement I: E/F (DRUJ--stabilize hand/wrist, pt e/f of elbow) Negative Negative   Volar RU Ligament Shift (DRUJ--stab ulna and wrist, push radius volarly) Negative Negative   Dorsal RU Ligament Shift (DRUJ--stab ulna and wrist, push radius dorsally) Negative Negative     - No apparent, excess subluxation of the bilateral ECU with FA rotation.     RESISTED TESTING:  Painful with resisted right wrist ulnar deviation, flexion and extension, negative in pronation/supination. Both  end range radial and ulnar deviation on the right is painful.       STRENGTH:     Measured in pounds 1/22/2025 1/22/2025    Left Right   Trial 1 90# 102#    88# 90#     PALPATION:  Non-tender to palpation right ulnar fovea, TFCC, along ECU.     Assessment & Plan   CLINICAL IMPRESSIONS  Medical Diagnosis: Right wrist pain, strain of flexor tendon of wrist, flexor tenosynovitis of finger    Treatment Diagnosis: Right wrist pain, strain of flexor tendon of wrist, flexor tenosynovitis of finger    Impression/Assessment: Pt is a 34 year old male presenting to Occupational Therapy due to right wrist pain, strain of flexor tendon of wrist, flexor tenosynovitis of finger.  The following significant findings have been identified: Impaired activity tolerance, Impaired coordination, Impaired ROM, Impaired strength, and Pain.  These identified deficits interfere with their ability to perform self care tasks, work tasks, recreational activities, household chores, driving , medication management, financial management,  yard work, care of others, and meal planning and preparation as compared to previous level of function.   Patient's limitations or Problem List includes: Pain, Decreased ROM/motion, Weakness, Decreased stability, Hypomobility, Decreased , Decreased pinch, Decreased coordination, Decreased dexterity, and Tightness in musculature of the right elbow, wrist, and hand which interferes with the patient's ability to perform Self Care Tasks (dressing, eating, bathing, hygiene/toileting), Work Tasks, Sleep Patterns, Recreational Activities, Household Chores, and Driving  as compared to previous level of function.    Clinical Decision Making (Complexity):  Assessment of Occupational Performance: 3-5 Performance Deficits  Occupational Performance Limitations: dressing, hygiene and grooming, care of others, care of pets, communication management, driving and community mobility, health management and maintenance, home  establishment and management, meal preparation and cleanup, shopping, sleep, work, leisure activities, and social participation  Clinical Decision Making (Complexity): Low complexity    PLAN OF CARE  Treatment Interventions:  Therapeutic Exercise:  AROM, AAROM, PROM, Tendon Gliding, Blocking, Reverse Blocking, Place and Hold, Contract Relax, Extensor Tracking, Isotonics, Isometrics, and Stabilization  Neuromuscular re-education:  Nerve Gliding, Coordination/Dexterity, Kinesthetic Training, Proprioceptive Training, Posture, Kinesiotaping, Strain Counter Strain, Isometrics, and Stabilization  Manual Techniques:  Coordination/Dexterity, Joint mobilization, Friction massage, Myofascial release, and Manual edema mobilization  Orthotic Fabrication:  Static and Forearm based  Self Care:  Self Care Tasks, Ergonomic Considerations, and Work Tasks    Long Term Goals   OT Goal 1  Goal Identifier: Goal I  Goal Description: Patient will exhibit 95 pounds or more of RUE  strength with the forearm pronated, minimal pain.  Rationale: In order to maximize safety and independence with performance of self-care activities;In order to maximize safety and independence with ADL/IADLs  Goal Progress: New  Target Date: 03/19/25      Frequency of Treatment: 1x/week  Duration of Treatment: 8 weeks     Education Assessment: Learner/Method: Patient;No Barriers to Learning;Pictures/Video;Demonstration;Reading;Listening     Risks and benefits of evaluation/treatment have been explained.   Patient/Family/caregiver agrees with Plan of Care.     Evaluation Time:    OT Eval, Low Complexity Minutes (06031): 21    Signing Clinician: Gaetano Charles OT

## 2025-01-24 ENCOUNTER — TELEPHONE (OUTPATIENT)
Dept: ORTHOPEDICS | Facility: CLINIC | Age: 35
End: 2025-01-24
Payer: COMMERCIAL

## 2025-01-24 NOTE — TELEPHONE ENCOUNTER
Teaching Flowsheet     Visit Type: Telephone    Time Start: 938  Time End: 952   Total Time Spent: 15 minutes    Surgeon: Dr. Stout  Location of Surgery (known or anticipated): at [facility].   Type of Anesthesia: Regional block and choice    Pertinent Medical History: Depression, elevated liver enzymes, and reviewed on the up to date problem list in the chart  Were medical conditions reviewed and appropriate for location? Yes  BMI: Normal BMI  12/19/24     Pulse 73     Ht 1.829 m (6')     Wt 82.6 kg (182 lb)     SpO2 97%     BMI 24.68 kg/m      BSA 2.05 m           Relevant Diagnosis: Nontraumatic complete tear of left rotator cuff [M75.122]  - Primary     Teaching Topic: 2/25/25 Left shoulder arthroscopy, arthroscopic rotator cuff repair, open subpectoralis biceps tenodesis, with Dr. Stout at [facility].     GAYLE 2/10  PT and Orthotics orders placed 1/14/25  Declined Nice1    Person(s) involved in teaching:   Patient  : No     Caregiver//  Name: Kylah  Phone Number: [phone]   Relationship: spouse  Consent to Communicate filed: No     Motivation Level:  Asks Questions: Yes  Eager to Learn: Yes  Cooperative: Yes  Receptive (willing/able to accept information): Yes  Any cultural factors/Synagogue beliefs that may influence understanding or compliance? No     Patient demonstrates understanding of the following:  Reason for the appointment, diagnosis and treatment plan: Yes  Knowledge of proper use of medications and conditions for which they are ordered (with special attention to potential side effects or drug interactions): Yes  Which situations necessitate calling provider and whom to contact: Yes     Teaching Concerns Addressed:   - Important contact info/ phone numbers: [facility] clinic number [phone]   - Map/ location of surgery  - Showering instructions  - Stop light tool    Does patient have difficulty getting a ride to  appointments (post-ops, PT/OT): No, explain: Licensed , supportive spouse.     Proper use and care of Ultrasling (medical equip, care aids, etc.): Yes  Nutritional needs and diet plan: Yes  Pain management techniques: Yes  Wound Care: Yes  How and/when to access community resources: Yes     Instructional Materials Used/Given: two bottles of chlorhexidine soap and a surgery packet given to patient in clinic.    Pre op packet mailed: No, explain: Given to patient in clinic.    Jie Jordan RN

## 2025-02-04 ENCOUNTER — THERAPY VISIT (OUTPATIENT)
Dept: OCCUPATIONAL THERAPY | Facility: CLINIC | Age: 35
End: 2025-02-04
Payer: COMMERCIAL

## 2025-02-04 DIAGNOSIS — M25.531 RIGHT WRIST PAIN: Primary | ICD-10-CM

## 2025-02-04 PROCEDURE — 97110 THERAPEUTIC EXERCISES: CPT | Mod: GO | Performed by: OCCUPATIONAL THERAPIST

## 2025-02-10 ENCOUNTER — OFFICE VISIT (OUTPATIENT)
Dept: FAMILY MEDICINE | Facility: CLINIC | Age: 35
End: 2025-02-10
Payer: COMMERCIAL

## 2025-02-10 VITALS
WEIGHT: 187.3 LBS | DIASTOLIC BLOOD PRESSURE: 70 MMHG | HEART RATE: 58 BPM | OXYGEN SATURATION: 100 % | TEMPERATURE: 97.4 F | SYSTOLIC BLOOD PRESSURE: 118 MMHG | BODY MASS INDEX: 25.37 KG/M2 | RESPIRATION RATE: 16 BRPM | HEIGHT: 72 IN

## 2025-02-10 DIAGNOSIS — Z01.818 PREOP GENERAL PHYSICAL EXAM: Primary | ICD-10-CM

## 2025-02-10 DIAGNOSIS — F33.0 MILD RECURRENT MAJOR DEPRESSION: ICD-10-CM

## 2025-02-10 DIAGNOSIS — M75.102 TEAR OF LEFT ROTATOR CUFF, UNSPECIFIED TEAR EXTENT, UNSPECIFIED WHETHER TRAUMATIC: ICD-10-CM

## 2025-02-10 PROCEDURE — 99214 OFFICE O/P EST MOD 30 MIN: CPT | Performed by: STUDENT IN AN ORGANIZED HEALTH CARE EDUCATION/TRAINING PROGRAM

## 2025-02-10 ASSESSMENT — PAIN SCALES - GENERAL: PAINLEVEL_OUTOF10: NO PAIN (0)

## 2025-02-10 ASSESSMENT — PATIENT HEALTH QUESTIONNAIRE - PHQ9
10. IF YOU CHECKED OFF ANY PROBLEMS, HOW DIFFICULT HAVE THESE PROBLEMS MADE IT FOR YOU TO DO YOUR WORK, TAKE CARE OF THINGS AT HOME, OR GET ALONG WITH OTHER PEOPLE: NOT DIFFICULT AT ALL
SUM OF ALL RESPONSES TO PHQ QUESTIONS 1-9: 1
SUM OF ALL RESPONSES TO PHQ QUESTIONS 1-9: 1

## 2025-02-10 NOTE — PROGRESS NOTES
Preoperative Evaluation  [address]  [address]  [place] [address]  Phone: [phone]  Fax: [phone]  Primary Provider: Tra Lombardo PA-C  Pre-op Performing Provider: Tra Lombardo PA-C  Feb 10, 2025             2/10/2025   Surgical Information   What procedure is being done? rotator cuff repair   Facility or Hospital where procedure/surgery will be performed: [facility]   Who is doing the procedure / surgery? Risto   Date of surgery / procedure: 2/25   Time of surgery / procedure: ?   Where do you plan to recover after surgery? at home with family     Fax number for surgical facility: Note does not need to be faxed, will be available electronically in Epic.    Assessment & Plan     The proposed surgical procedure is considered INTERMEDIATE risk.    Preop general physical exam  Tear of left rotator cuff, unspecified tear extent, unspecified whether traumatic  No labs indicated with history or proposed surgery. No EKG indicated with no history of heart disease, arrhythmia, insulin dependent diabetes, CKD, or PAD.         - No identified additional risk factors other than previously addressed    Antiplatelet or Anticoagulation Medication Instructions   - We reviewed the medication list and the patient is not on an antiplatelet or anticoagulation medications.    Additional Medication Instructions   - Herbal medications and vitamins: DO NOT TAKE 14 days prior to surgery.   - NSAIDs (ibuprofen or naproxen): DO NOT TAKE 3 days before surgery.    - Tylenol is ok.     Recommendation  Approval given to proceed with proposed procedure, without further diagnostic evaluation.    Mild major depression  Overall stable and much improved on paxil 12.5mg daily. NO side effects. PHQ-9 1 of 1 with no suicidal thoughts or plan. Continue current regimen. No refills needed at this time.     Sumit Turner is a 34 year old, presenting for the following:  Pre-Op  Exam (For rotator cuff repair with Dr. Adames on 2/25/25 at [facility] at [facility].)          2/10/2025     1:18 PM   Additional Questions   Roomed by Mel PABLO MA   Accompanied by Self         2/10/2025     1:18 PM   Patient Reported Additional Medications   Patient reports taking the following new medications None     HPI related to upcoming procedure: Left rotator cuff tear repair of left shoulder with Dr. Adames.         2/10/2025   Pre-Op Questionnaire   Have you ever had a heart attack or stroke? No   Have you ever had surgery on your heart or blood vessels, such as a stent placement, a coronary artery bypass, or surgery on an artery in your head, neck, heart, or legs? No   Do you have chest pain with activity? No   Do you have a history of heart failure? No   Do you currently have a cold, bronchitis or symptoms of other infection? No   Do you have a cough, shortness of breath, or wheezing? He has had a mild cough with cough that is almost resolved; negative COVID. Otherwise feels well.    Do you or anyone in your family have previous history of blood clots? No   Do you or does anyone in your family have a serious bleeding problem such as prolonged bleeding following surgeries or cuts? No   Have you ever had problems with anemia or been told to take iron pills? No   Have you had any abnormal blood loss such as black, tarry or bloody stools? No   Have you ever had a blood transfusion? No   Are you willing to have a blood transfusion if it is medically needed before, during, or after your surgery? Yes   Have you or any of your relatives ever had problems with anesthesia? No   Do you have sleep apnea, excessive snoring or daytime drowsiness? No   Do you have any artifical heart valves or other implanted medical devices like a pacemaker, defibrillator, or continuous glucose monitor? No   Do you have artificial joints? No   Are you allergic to latex? No     Health Care Directive  Patient  does not have a Health Care Directive: Discussed advance care planning with patient; information given to patient to review.    Preoperative Review of    reviewed - no record of controlled substances prescribed.          Patient Active Problem List    Diagnosis Date Noted    Right wrist pain 02/04/2025     Priority: Medium    Elevated liver enzymes 10/17/2024     Priority: Medium    Subclinical hypothyroidism 10/17/2024     Priority: Medium    Chronic left shoulder pain 10/17/2024     Priority: Medium    Generalized anxiety disorder 10/17/2024     Priority: Medium    Major depressive disorder, single episode, moderate (H) 10/17/2024     Priority: Medium    Thyrotoxicosis      Priority: Medium     Problem list name updated by automated process. Provider to review      Calculus of kidney      Priority: Medium      Past Medical History:   Diagnosis Date    Calculus of kidney 10/01/2006    Lumbar radiculopathy     Thyrotoxicosis without mention of goiter or other cause, without mention of thyrotoxic crisis or storm      Past Surgical History:   Procedure Laterality Date    LAMINECTOMY LUMBAR ONE LEVEL Right 5/20/2024    Procedure: RIGHT LUMBAR FOUR TO LUMBAR FIVE HEMILAMINECTOMY, MEDIAL FACETECTOMY, FORAMINOTOMY AND MICRODISCECTOMY;  Surgeon: Viviana Sorensen MD;  Location: [facility] OR    SURGICAL HISTORY OF -   10/06    cystoscopy/ureteral stone extraction - dr herndon     Current Outpatient Medications   Medication Sig Dispense Refill    PARoxetine (PAXIL-CR) 12.5 MG 24 hr tablet Take 1 tablet (12.5 mg) by mouth every morning. 90 tablet 3       Allergies   Allergen Reactions    Compazine [Prochlorperazine]      Dystonic jaw movements          Social History     Tobacco Use    Smoking status: Never     Passive exposure: Yes    Smokeless tobacco: Never    Tobacco comments:     Socially  1-2 per day   Substance Use Topics    Alcohol use: No       History   Drug Use No               Objective    /70  (BP Location: Right arm, Patient Position: Sitting, Cuff Size: Adult Regular)   Pulse 58   Temp 97.4  F (36.3  C) (Temporal)   Resp 16   Ht 1.829 m (6')   Wt 85 kg (187 lb 4.8 oz)   SpO2 100%   BMI 25.40 kg/m     Estimated body mass index is 25.4 kg/m  as calculated from the following:    Height as of this encounter: 1.829 m (6').    Weight as of this encounter: 85 kg (187 lb 4.8 oz).  Physical Exam  GENERAL: alert and no distress  EYES: Eyes grossly normal to inspection, PERRL and conjunctivae and sclerae normal  HENT: ear canals and TM's normal, nose and mouth without ulcers or lesions  NECK: no adenopathy, no asymmetry, masses, or scars  RESP: lungs clear to auscultation - no rales, rhonchi or wheezes  CV: regular rate and rhythm, normal S1 S2, no S3 or S4, no murmur, click or rub, no peripheral edema  MS: no gross musculoskeletal defects noted, no edema  SKIN: no suspicious lesions or rashes  NEURO: Normal strength and tone, mentation intact and speech normal  PSYCH: mentation appears normal, affect normal/bright    Recent Labs   Lab Test 10/17/24  1704 09/20/24  2031 05/08/24  0951   HGB  --   --  13.8   PLT  --   --  221   NA  --  140 141   POTASSIUM  --  4.2 4.7   CR  --  1.05 1.02   A1C 5.4  --   --         Revised Cardiac Risk Index (RCRI)  The patient has the following serious cardiovascular risks for perioperative complications:   - No serious cardiac risks = 0 points     RCRI Interpretation: 0 points: Class I (very low risk - 0.4% complication rate)         Signed Electronically by: Tra Lombardo PA-C  A copy of this evaluation report is provided to the requesting physician.         Answers submitted by the patient for this visit:  Patient Health Questionnaire (Submitted on 2/10/2025)  If you checked off any problems, how difficult have these problems made it for you to do your work, take care of things at home, or get along with other people?: Not difficult at all  PHQ9 TOTAL SCORE: 1

## 2025-02-10 NOTE — PATIENT INSTRUCTIONS
How to Take Your Medication Before Surgery  Preoperative Medication Instructions   Antiplatelet or Anticoagulation Medication Instructions   - We reviewed the medication list and the patient is not on an antiplatelet or anticoagulation medications.    Additional Medication Instructions   - Herbal medications and vitamins: DO NOT TAKE 14 days prior to surgery.   - NSAIDs (ibuprofen or naproxen): DO NOT TAKE 3 days before surgery.    - Tylenol is ok.        Patient Education   Preparing for Your Surgery  For Adults  Getting started  In most cases, a nurse will call to review your health history and instructions. They will give you an arrival time based on your scheduled surgery time. Please be ready to share:  Your doctor's clinic name and phone number  Your medical, surgical, and anesthesia history  A list of allergies and sensitivities  A list of medicines, including herbal treatments and over-the-counter drugs  Whether the patient has a legal guardian (ask how to send us the papers in advance)  Note: You may not receive a call if you were seen at our PAC (Preoperative Assessment Center).  Please tell us if you're pregnant--or if there's any chance you might be pregnant. Some surgeries may injure a fetus (unborn baby), so they require a pregnancy test. Surgeries that are safe for a fetus don't always need a test, and you can choose whether to have one.   Preparing for surgery  Within 10 to 30 days of surgery: Have a pre-op exam (sometimes called an H&P, or History and Physical). This can be done at a clinic or pre-operative center.  If you're having a , you may not need this exam. Talk to your care team.  At your pre-op exam, talk to your care team about all medicines you take. (This includes CBD oil and any drugs, such as THC, marijuana, and other forms of cannabis.) If you need to stop any medicine before surgery, ask when to start taking it again.  This is for your safety. Many medicines and drugs can  make you bleed too much during surgery. Some change how well surgery (anesthesia) drugs work.  Call your insurance company to let them know you're having surgery. (If you don't have insurance, call [phone].)  Call your clinic if there's any change in your health. This includes a scrape or scratch near the surgery site, or any signs of a cold (sore throat, runny nose, cough, rash, fever).  Eating and drinking guidelines  For your safety: Unless your surgeon tells you otherwise, follow the guidelines below.  Eat and drink as normal until 8 hours before you arrive for surgery. After that, no food or milk. You can spit out gum when you arrive.  Drink clear liquids until 2 hours before you arrive. These are liquids you can see through, like water, Gatorade, and Propel Water. They also include plain black coffee and tea (no cream or milk).  No alcohol for 24 hours before you arrive. The night before surgery, stop any drinks that contain THC.  If your care team tells you to take medicine on the morning of surgery, it's okay to take it with a sip of water. No other medicines or drugs are allowed (including CBD oil)--follow your care team's instructions.  If you have questions the day of surgery, call your hospital or surgery center.   Preventing infection  Shower or bathe the night before and the morning of surgery. Follow the instructions your clinic gave you. (If no instructions, use regular soap.)  Don't shave or clip hair near your surgery site. We'll remove the hair if needed.  Don't smoke or vape the morning of surgery. No chewing tobacco for 6 hours before you arrive. A nicotine patch is okay. You may spit out nicotine gum when you arrive.  For some surgeries, the surgeon will tell you to fully quit smoking and nicotine.  We will make every effort to keep you safe from infection. We will:  Clean our hands often with soap and water (or an alcohol-based hand rub).  Clean the skin at your surgery site with a  special soap that kills germs.  Give you a special gown to keep you warm. (Cold raises the risk of infection.)  Wear hair covers, masks, gowns, and gloves during surgery.  Give antibiotic medicine, if prescribed. Not all surgeries need this medicine.  What to bring on the day of surgery  Photo ID and insurance card  Copy of your health care directive, if you have one  Glasses and hearing aids (bring cases)  You can't wear contacts during surgery  Inhaler and eye drops, if you use them (tell us about these when you arrive)  CPAP machine or breathing device, if you use them  A few personal items, if spending the night  If you have . . .  A pacemaker, ICD (cardiac defibrillator), or other implant: Bring the ID card.  An implanted stimulator: Bring the remote control.  A legal guardian: Bring a copy of the certified (court-stamped) guardianship papers.  Please remove any jewelry, including body piercings. Leave jewelry and other valuables at home.  If you're going home the day of surgery  You must have a responsible adult drive you home. They should stay with you overnight as well.  If you don't have someone to stay with you, and you aren't safe to go home alone, we may keep you overnight. Insurance often won't pay for this.  After surgery  If it's hard to control your pain or you need more pain medicine, please call your surgeon's office.  Questions?   If you have any questions for your care team, list them here:   ____________________________________________________________________________________________________________________________________________________________________________________________________________________________________________________________  For informational purposes only. Not to replace the advice of your health care provider. Copyright   2003, 2019 [facility]. All rights reserved. Clinically reviewed by Oren Frost MD. [employer] 273804 - REV 08/24.

## 2025-02-13 ENCOUNTER — TELEPHONE (OUTPATIENT)
Dept: ORTHOPEDICS | Facility: CLINIC | Age: 35
End: 2025-02-13
Payer: COMMERCIAL

## 2025-02-13 NOTE — TELEPHONE ENCOUNTER
Please clarify is patient's surgery is on 2/21 or 2/25.     Writer aware it will not show up in MyChart as it is at [facility].     Rosalinda Pardo, ATC

## 2025-02-13 NOTE — TELEPHONE ENCOUNTER
Other: Requesting c/b- is supposed to have surgery 02/25, but it's not showing as scheduled     Could we send this information to you in "[employer]t or would you prefer to receive a phone call?:   Patient would prefer a phone call   Okay to leave a detailed message?: No at Cell number on file:    Telephone Information:   Mobile [phone]

## 2025-02-17 NOTE — PROGRESS NOTES
HISTORY OF PRESENT ILLNESS:  Franco Raymond is a 34 year old male who is one week S/P Left shoulder arthroscopic rotator cuff repair (DOS:2/25/2025) with Dr. Stout  Today: Patient reports doing well. He states pain has been fairly manageable, and has not needed to take oxycodone for 3 days .He has been alternating between tylenol and ibuprofen for pain, and had a THC tonic last night.  Sutures remain intact pt is wearing shoulder immobilizer full time, removing 3x a day for elbow/wrist ROM exercises and pendulum exercises. He has been attending physical therapy 1x/week.  Denies Chest pain, Calve pain, Fever, Chills.    PHYSICAL EXAM:  There were no vitals taken for this visit.  There is no weight on file to calculate BMI.   GENERAL APPEARANCE: healthy, alert and no distress   PSYCH: mentation appears normal and affect normal/bright    MSK:  Left Shoulder  Incision clean and dry, Sutures present, healing.  Normal postop incisional erythema.   Ecchymosis: None.  Edema: None  CMS: isabelle incisional numbness, otherwise grossly intact to digits.  AROM: Full elbow rist ROM. Shoulder ROM deferred at this time    Radiology:   Intraoperative arthroscopic images reviewed with patient      ASSESSMENT:  Franco Raymond is a 34 year old male who is one week S/P Left shoulder arthroscopic rotator cuff repair (DOS:2/25/2025) with Dr. Stout, improving appropriately,.      PLAN:  - Surgery discussed, images reviewed if applicable, and all questions were answered at this time.  - Sutures removed with sterile technique, steri-strips applied in usual fashion, care instructions given and verbally acknowledged.  - May allow soap/water to run over incision. Avoid submerging in pool/tub x2 weeks.  - Medications: Taper RX pain meds, OTC PRN.  - Physical Therapy: As instructed / RICE and PROM.  Reviewed shoulder pendulum exercises today in clinic. Patient performed pendulum exercises today in clinic.       Return to clinic on 4/4/2025 with  EDVIN FriasC  Physician Assistant   Oncology and Adult Reconstructive Surgery  Dept Orthopaedic Surgery, [facility]      3/6/2025

## 2025-02-25 ENCOUNTER — NON-VISIT BILLABLE ENCOUNTER (OUTPATIENT)
Dept: ORTHOPEDICS | Facility: CLINIC | Age: 35
End: 2025-02-25
Payer: COMMERCIAL

## 2025-02-25 ENCOUNTER — NURSE TRIAGE (OUTPATIENT)
Dept: NURSING | Facility: CLINIC | Age: 35
End: 2025-02-25
Payer: COMMERCIAL

## 2025-02-25 DIAGNOSIS — Z98.890 S/P LEFT ROTATOR CUFF REPAIR: Primary | ICD-10-CM

## 2025-02-25 RX ORDER — ACETAMINOPHEN 500 MG
500-1000 TABLET ORAL EVERY 6 HOURS PRN
Qty: 90 TABLET | Refills: 0 | Status: SHIPPED | OUTPATIENT
Start: 2025-02-25 | End: 2025-02-25

## 2025-02-25 RX ORDER — IBUPROFEN 600 MG/1
600 TABLET, FILM COATED ORAL EVERY 6 HOURS PRN
Qty: 90 TABLET | Refills: 0 | Status: SHIPPED | OUTPATIENT
Start: 2025-02-25

## 2025-02-25 RX ORDER — ONDANSETRON 4 MG/1
4 TABLET, ORALLY DISINTEGRATING ORAL EVERY 8 HOURS PRN
Qty: 10 TABLET | Refills: 0 | Status: SHIPPED | OUTPATIENT
Start: 2025-02-25

## 2025-02-25 RX ORDER — ACETAMINOPHEN 500 MG
500-1000 TABLET ORAL EVERY 6 HOURS PRN
Qty: 90 TABLET | Refills: 0 | Status: SHIPPED | OUTPATIENT
Start: 2025-02-25

## 2025-02-25 RX ORDER — OXYCODONE HYDROCHLORIDE 5 MG/1
5-10 TABLET ORAL EVERY 6 HOURS PRN
Qty: 40 TABLET | Refills: 0 | Status: SHIPPED | OUTPATIENT
Start: 2025-02-25

## 2025-02-25 NOTE — PROCEDURES
Date of Surgery: February 25, 2025    Location: [facility]    Surgeon: Jacky Stout MD     Assistants:   1. Dwayne Lacey PA-C    A skilled surgical assistant was required to assist with patient positioning, draping, and retraction during open portions of the case. Specifically, Mr Lacey was critical for patient positioning, retraction during open portions of the procedure, holding the arthroscope during implant implementation, closure, and sling placement.     Pre-operative Diagnosis:   1) Left Rotator cuff Tear  2) Left Biceps Tendinosis    Post-operative Diagnosis:   1) Left Rotator cuff Tear    Procedure:   1) Left Arthroscopic Rotator Cuff Repair - 41163     Implants:  Arthrex 2.6 FiberTak Dual Loaded FiberTape x1, 4.75mm SwiveLock x1,     Anesthesia: General with Block    Estimated Blood Loss: 10 ml       Complications: None       Specimen: None    Condition: Stable to PACU    Procedure Details   Indications for Procedure:  Patient is a 34-year-old male known to my practice with a left shoulder rotator cuff tear and long head biceps tendinosis.  For full history and physical please see my office notes.  In brief review, He has struggled with anterior and anterior lateral shoulder pain for over 6 months.  At that time x-ray and MRI showed high-grade tearing of the supraspinatus at the supraspinatus/infraspinatus junction.   We discussed nonoperative management the form of oral anti-inflammatory medication, activity modification, physical therapy, the role of corticosteroid injection.  We discussed operative management the form of left shoulder arthroscopic rotator cuff repair and open long head biceps tenodesis.  I described the operative technique.  I outlined the postoperative protocol.  We discussed risk and benefits of operative intervention including but not limited to bleeding, infection, failure to cure pain, damage to nerves and blood vessels, stiffness, loss of  function, re-injury to the rotator cuff, posttraumatic arthritis, loss of limb and loss of life.  I adapting answers questions.  Ultimately, the patient felt that they had trialed exhaustive nonoperative management and wished to move forward with operative intervention.     Procedure Details:  On the day of surgery the patient was met in the preoperative holding area.  The correct limb was confirmed and marked with a permanent skin marker.  Informed consent was again reviewed confirming the correct patient, site, procedure, laterality, and surgeon.  I again discussed the risk and benefits of operative intervention, as well as non-operative alternatives.  I had the opportunity to answer their questions.  The patient wished to move forward with operative intervention.  A regional anesthetic block was performed by my anesthesia colleagues.     The patient was brought back the operative suite, transferred from the hospital bed to the operative table without incident and secured using a belt.  General anesthesia was induced by my anesthesia colleagues.  The patient was placed in the beachchair position.   The operative shoulder was prepped and draped in the normal fashion.  A final timeout was performed with all in the room in agreement with the correct patient, site, procedure, and surgeon, as well as preoperative antibiotics have been instilled.     I localized the anterior, posterior, and lateral portals as well as the incision for the open subpectoral biceps tenodesis by palpation of bony landmarks and the inferior border of the pec tendon.  I injected a total of 15 cc of 0.25% Marcaine without epinephrine into the proposed portal and incision sites.  I created the posterior portal with an 11 blade.  The trocar was inserted.  The camera was inserted.  I drove immediately to the front of the shoulder.  A spinal needle was utilized to localize the anterior portal.  This was created with an 11 blade.  A purple cannula  was placed here.  I began my diagnostic arthroscopy.      There was noted to be no significant fraying of the superior labrum and the root of the long head biceps tendon.  The long head of the biceps was pulled into the joint and it appeared pristine.  There was noted to be some scarring of the middle glenohumeral ligament and rotator interval.  However the biceps root, superior labrum, and long of the biceps appeared pristine.  I therefore did not elect to perform open subpectoralis biceps tenodesis.    I then moved to the front of the shoulder.   I performed a rotator interval release with arthroscopic shaver and ablator.  I performed this back to the conjoined tendon.   I was able to evaluate the subscapularis tendon.  Was healthy and its insertion onto the lesser tuberosity was intact.       I then continued my diagnostic arthroscopy by evaluating the articular side of the supraspinatus infraspinatus.  There is noted to be partial-thickness tear of the posterior aspect of the supraspinatus.  I tagged this with a #2 PDS.  The teres minor was noted to be intact.  I moved to the axillary recess. No loose bodies or synovitis in the axillary pouch.  There was no fraying of the posterior labrum.  I did not appreciate any areas of full-thickness cartilage loss from the humeral head or glenoid.     At this time we turned our attention to the subacromial space.  From the posterior portal, the trochar was inserted into the subacromial space with a trocar.  I inserted the camera.  I localized the lateral portal with a spinal needle.  This was created with an 11 blade.  I then remove large portions of the subacromial bursa with the arthroscopic shaver and ablator.   I cleared the rotator cuff tendons and footprint of any bursae or scar bands.  I outlined the anterior and lateral aspect of the acromion with the RF ablator.     I carried the decompression medially to the AC joint.  This then allowed full visualization of the  supraspinatus, infraspinatus, and teres minor footprint.  I found location of our tag suture.  This corresponded with the junction of the supraspinatus and infraspinatus.  I probed this area with a switching stick.  I was easily able to push through the remaining fibers of the supraspinatus tendon.  There was well greater than 50% tearing of the supraspinatus tendon here.     I localize a posterior lateral viewing portal with a spinal needle and created this with an 11 blade.  The camera was transitioned here.  I then set about to perform the rotator cuff repair.  I gently debrided the tear site with arthroscopic shaver.  Ultimately proved to be approximately 1 cm anterior to posterior.  I debrided the greater tuberosity to stimulate bleeding subchondral bone.  I ultimately elected to perform a double row cuff repair with a single medial row anchor.  I localized an accessory lateral portal off the tip of the acromion with a spinal needle.  This was created with an 11 blade.  The metal trocar for the fiber tack was inserted through here.  I deployed a dual loaded 2.6 mm self punching fiber tack on the medial aspect of the greater tuberosity, abutting the articular surface.  I then sequentially passed all 4 suture limbs through the posterior aspect of the supraspinatus and anterior aspect of the infraspinatus.  I worked posteriorly to anteriorly.  All 4 achieved full-thickness bites.  Under direct arthroscopic visualization confirmed that I did not incarcerate the superior labrum.  I then sequentially tied the 2 pairs of suture tails with a Malloy sliding knot to compress the cuff on the medial aspect of the footprint.  I then retrieved all 4 suture limbs out the lateral portal.  I selected the site for a single 4.75 mm swivel lock lateral anchor off the tip of the greater tuberosity.  All 4 suture limbs were passed to the anchor.  The anchor was malleted into place.  All 4 suture limbs were tensioned.  This provided  "excellent compression at the tear site.  The corkscrew portion of the SwiveLock was deployed until was flush with the cortical bone.  This achieved good fixation.  Excess suture was cut.     I evaluated the repair.  There is found to be good compression of the rotator cuff to its footprint.  The rotator cuff tendon moved as a unit with range of motion of the shoulder.  At this time I reinserted the camera into the joint through the posterior portal and confirmed that the fiber tape had not been passed through the superior labrum and that there was good medial row compression.     Having completed our procedure, all trocars and cannulas were removed.  The shoulder was drained of any effusion.  The portal sites were closed with 3-0 nylon in a figure-of-eight fashion.  Soft dressings were applied.  The drapes were taken down.  2+ radial pulse was palpable.  The patient was placed in UltraSling.  He was successfully awoken from general anesthesia.  Is transferred from the operative table to the hospital bed without incident.  He returned to PACU in stable condition.     All sponge, needle, and instrument counts were correct at the end of the case.     Post-Operative Plan:  Patient will be discharged home from PACU when meeting discharge criteria.  I have prescribed oral pain medication to the outpatient pharmacy.    -Activity: sling except for clothing, and bathing. Patient may also come out of his sling for 3 times daily elbow and wrist range of motion as well as gentle Codman exercises.    -Physical therapy: to start next week per the \"standard postoperative rotator cuff repair protocol\" . This to include start gentle passive range of motion below shoulder level.    -Follow-up: outpatient clinic in 2 weeks in the outpatient clinic for suture removal and to go over his intraoperative images.    Jacky Stout MD    [facility]   Department of Orthopedic Surgery      Disclaimer: This note " consists of symbols derived from keyboarding, dictation and/or voice recognition software. As a result, there may be errors in the script that have gone undetected. Please consider this when interpreting information found in this chart.

## 2025-02-26 NOTE — TELEPHONE ENCOUNTER
"Nurse Triage SBAR    Is this a 2nd Level Triage? YES, LICENSED PRACTITIONER REVIEW IS REQUIRED    Situation:   Swelling and redness in left hand and bicep    Background: Had rotator cuff surgery on left today 2-25-25.    Assessment: Left arm and hand is swollen and \"a little red\" on bicep and hand and warm.  He says his block is still affective so he is still numb and little pain. He has moved his sling around but that doesn't seem to make a difference. Denies fever, dyspnea, chest pain, is drinking, eating, voiding.    Protocol Recommended Disposition:   Call PCP Now    Recommendation: Warm transferred to orthopedic surgery answering service for recommendation per protocol.  Patient denied any other questions before transferring.     Warm transferred patient to ortho surgery answering service.    Maci Alonzo RN  [facility]      Does the patient meet one of the following criteria for ADS visit consideration? 16+ years old, with an [facility] PCP     TIP  Providers, please consider if this condition is appropriate for management at one of our Acute and Diagnostic Services sites.     If patient is a good candidate, please use dotphrase <dot>triageresponse and select Refer to ADS to document.    Reason for Disposition   [1] Caller has URGENT question AND [2] triager unable to answer question    Additional Information   Negative: Sounds like a life-threatening emergency to the triager   Negative: Chest pain   Negative: Difficulty breathing   Negative: [1] Widespread rash AND [2] bright red, sunburn-like   Negative: [1] SEVERE headache AND [2] after spinal (epidural) anesthesia   Negative: [1] Vomiting AND [2] persists > 4 hours   Negative: [1] Vomiting AND [2] abdomen looks much more swollen than usual   Negative: [1] Drinking very little AND [2] dehydration suspected (e.g., no urine > 12 hours, very dry mouth, very lightheaded)   Negative: Patient sounds very sick or weak to the triager   Negative: Sounds " like a serious complication to the triager   Negative: Fever > 100.4 F (38.0 C)   Negative: Cast problems or questions   Negative: [1] SEVERE post-op pain (e.g., excruciating, pain scale 8-10) AND [2] not controlled with pain medications    Protocols used: Post-Op Symptoms and [id number], Splint Symptoms and [id number]

## 2025-03-02 NOTE — PROGRESS NOTES
PHYSICAL THERAPY EVALUATION  Type of Visit: Evaluation              Subjective         Presenting condition or subjective complaint: rotator cuff surgery  Date of onset: 02/25/25    Relevant medical history: Depression; Smoking; Thyroid problems   Dates & types of surgery: rotator cuff repair, 2/25    Prior diagnostic imaging/testing results: MRI; X-ray     Prior therapy history for the same diagnosis, illness or injury: No      Living Environment  Social support: With a significant other or spouse   Type of home: House; Basement   Stairs to enter the home: Yes 5 Is there a railing: Yes     Ramp: No   Stairs inside the home: Yes 10 Is there a railing: Yes     Help at home: Self Cares (home health aide/personal care attendant, family, etc)  Equipment owned:       Employment: Yes   Hobbies/Interests: eddie simmons    Patient goals for therapy: eddie simmons    Franco Raymond is a 34 year old male with a left shoulder condition. Mechanism of injury: s/p left rotator cuff repair on 2/25/25 - no biceps tenodesis performed. Where: (home, work, MVA, community, recreation/sport, unknown, other): NA. Location of symptoms: anterior, lateral. Pain level on number scale: 1-10/10. Quality of pain: achy, stabbing. Associated symptoms: weakness, stiffness. Pain frequency (constant/intermittent): constant. Symptoms are exacerbated by: reaching, lifting, pushing, dressing, washing, carrying. Symptoms are relieved by: wearing sling, pain medication, icing. Progression of symptoms since onset (same/better/worse): better. Special tests (x-ray, MRI, CT scan, EMG, bone scan): x-ray, MRI. Previous treatment: None. Improvement with previous treatment: NA. General health as reported by patient is good. Pertinent medical history includes:  see Epic. Medical allergies includes: see Epic. Surgical history includes: see Epic. Current medications include: see Epic. Occupation: . Patient is (working in normal job without  restrictions/working in normal job with restrictions/working in an alternate job/not working due to present treatment problem): working tomorrow from home. Primary job tasks: computer work. Barriers at home/work: None reported by patient. Red flags: None reported by patient.     Objective   Posture    Forward Head +   Rounded Shoulders +   Scapular Winging      Shoulder PROM (* = pain) Right Left   FL WNL 70*   ABD WNL 70*   ER (neutral) WNL 5*   IR WNL NT     Palpation tenderness:  Anterior, posterior, upper trap, biceps, medial forearm  Assessment & Plan   CLINICAL IMPRESSIONS  Medical Diagnosis: Nontraumatic complete tear of rotator cuff,    Treatment Diagnosis: Nontraumatic complete tear of rotator cuff,   Impression/Assessment: Patient is a 34 year old male with left shoulder complaints.  The following significant findings have been identified: Pain, Decreased ROM/flexibility, Decreased joint mobility, Decreased strength, Impaired balance, Decreased proprioception, Impaired sensation, Impaired muscle performance, Decreased activity tolerance, and Impaired posture. These impairments interfere with their ability to perform self care tasks, work tasks, recreational activities, household chores, and driving  as compared to previous level of function.     Clinical Decision Making (Complexity):  Clinical Presentation: Stable/Uncomplicated  Clinical Presentation Rationale: based on medical and personal factors listed in PT evaluation  Clinical Decision Making (Complexity): Low complexity    PLAN OF CARE  Treatment Interventions:  Interventions: Manual Therapy, Neuromuscular Re-education, Therapeutic Activity, Therapeutic Exercise, Self-Care/Home Management    Long Term Goals     PT Goal 1  Goal Description: Patient will have unrestricted reaching with 0/10 pain.  Rationale: to maximize safety and independence with performance of ADLs and functional tasks  Target Date: 05/27/25      Frequency of Treatment:  1x/week  Duration of Treatment: for 4 weeks tapering down to 2x/month for 8 weeks    Recommended Referrals to Other Professionals:  None  Education Assessment:   Learner/Method: Patient;No Barriers to Learning    Risks and benefits of evaluation/treatment have been explained.   Patient/Family/caregiver agrees with Plan of Care.     Evaluation Time:     PT Bobby, Low Complexity Minutes (35888): 15  Signing Clinician: Rocky Barrientos PT

## 2025-03-04 ENCOUNTER — THERAPY VISIT (OUTPATIENT)
Dept: PHYSICAL THERAPY | Facility: CLINIC | Age: 35
End: 2025-03-04
Attending: STUDENT IN AN ORGANIZED HEALTH CARE EDUCATION/TRAINING PROGRAM
Payer: COMMERCIAL

## 2025-03-04 DIAGNOSIS — Z98.890 S/P LEFT ROTATOR CUFF REPAIR: ICD-10-CM

## 2025-03-04 DIAGNOSIS — M75.122 NONTRAUMATIC COMPLETE TEAR OF ROTATOR CUFF, LEFT: ICD-10-CM

## 2025-03-04 PROCEDURE — 97161 PT EVAL LOW COMPLEX 20 MIN: CPT | Mod: GP | Performed by: PHYSICAL THERAPIST

## 2025-03-04 PROCEDURE — 97110 THERAPEUTIC EXERCISES: CPT | Mod: GP | Performed by: PHYSICAL THERAPIST

## 2025-03-04 PROCEDURE — 97140 MANUAL THERAPY 1/> REGIONS: CPT | Mod: GP | Performed by: PHYSICAL THERAPIST

## 2025-03-04 ASSESSMENT — ACTIVITIES OF DAILY LIVING (ADL)
AT_ITS_WORST?: 8
WASHING_YOUR_HAIR?: 8
PLACING_AN_OBJECT_ON_A_HIGH_SHELF: 10
REMOVING_SOMETHING_FROM_YOUR_BACK_POCKET: 6
CARRYING_A_HEAVY_OBJECT_OF_10_POUNDS: 8
PUTTING_ON_AN_UNDERSHIRT_OR_A_PULLOVER_SWEATER: 8
PUTTING_ON_A_SHIRT_THAT_BUTTONS_DOWN_THE_FRONT: 6
PUSHING_WITH_THE_INVOLVED_ARM: 8
WHEN_LYING_ON_THE_INVOLVED_SIDE: 8
PUTTING_ON_YOUR_PANTS: 6
WASHING_YOUR_BACK: 10
TOUCHING_THE_BACK_OF_YOUR_NECK: 10
PLEASE_INDICATE_YOR_PRIMARY_REASON_FOR_REFERRAL_TO_THERAPY:: SHOULDER
REACHING_FOR_SOMETHING_ON_A_HIGH_SHELF: 10

## 2025-03-06 ENCOUNTER — OFFICE VISIT (OUTPATIENT)
Dept: ORTHOPEDICS | Facility: CLINIC | Age: 35
End: 2025-03-06
Payer: COMMERCIAL

## 2025-03-06 DIAGNOSIS — Z48.89 ENCOUNTER FOR POSTOPERATIVE CARE: Primary | ICD-10-CM

## 2025-03-06 DIAGNOSIS — Z98.890 S/P LEFT ROTATOR CUFF REPAIR: ICD-10-CM

## 2025-03-06 NOTE — LETTER
3/6/2025      Franco Raymond  [address]  [facility] [address]      Dear Colleague,    Thank you for referring your patient, Franco Raymond, to the [facility] [place]. Please see a copy of my visit note below.    HISTORY OF PRESENT ILLNESS:  Franco Raymond is a 34 year old male who is one week S/P Left shoulder arthroscopic rotator cuff repair (DOS:2/25/2025) with Dr. Stout  Today: Patient reports doing well. He states pain has been fairly manageable, and has not needed to take oxycodone for 3 days .He has been alternating between tylenol and ibuprofen for pain, and had a THC tonic last night.  Sutures remain intact pt is wearing shoulder immobilizer full time, removing 3x a day for elbow/wrist ROM exercises and pendulum exercises. He has been attending physical therapy 1x/week.  Denies Chest pain, Calve pain, Fever, Chills.    PHYSICAL EXAM:  There were no vitals taken for this visit.  There is no weight on file to calculate BMI.   GENERAL APPEARANCE: healthy, alert and no distress   PSYCH: mentation appears normal and affect normal/bright    MSK:  Left Shoulder  Incision clean and dry, Sutures present, healing.  Normal postop incisional erythema.   Ecchymosis: None.  Edema: None  CMS: isabelle incisional numbness, otherwise grossly intact to digits.  AROM: Full elbow rist ROM. Shoulder ROM deferred at this time    Radiology:   Intraoperative arthroscopic images reviewed with patient      ASSESSMENT:  Franco Raymond is a 34 year old male who is one week S/P Left shoulder arthroscopic rotator cuff repair (DOS:2/25/2025) with Dr. Stout, improving appropriately,.      PLAN:  - Surgery discussed, images reviewed if applicable, and all questions were answered at this time.  - Sutures removed with sterile technique, steri-strips applied in usual fashion, care instructions given and verbally acknowledged.  - May allow soap/water to run over incision. Avoid submerging in pool/tub x2 weeks.  -  Medications: Taper RX pain meds, OTC PRN.  - Physical Therapy: As instructed / RICE and PROM.  Reviewed shoulder pendulum exercises today in clinic. Patient performed pendulum exercises today in clinic.       Return to clinic on 4/4/2025 with Dr. Girish Castellanos PA-C  Physician Assistant   Oncology and Adult Reconstructive Surgery  Dept Orthopaedic Surgery, [facility]      3/6/2025         Again, thank you for allowing me to participate in the care of your patient.        Sincerely,        Jaspal Castellanos PA-C    Electronically signed

## 2025-03-10 ENCOUNTER — THERAPY VISIT (OUTPATIENT)
Dept: PHYSICAL THERAPY | Facility: CLINIC | Age: 35
End: 2025-03-10
Payer: COMMERCIAL

## 2025-03-10 DIAGNOSIS — M75.122 NONTRAUMATIC COMPLETE TEAR OF ROTATOR CUFF, LEFT: ICD-10-CM

## 2025-03-10 DIAGNOSIS — Z98.890 S/P LEFT ROTATOR CUFF REPAIR: Primary | ICD-10-CM

## 2025-03-10 PROCEDURE — 97110 THERAPEUTIC EXERCISES: CPT | Mod: GP

## 2025-03-24 ENCOUNTER — THERAPY VISIT (OUTPATIENT)
Dept: PHYSICAL THERAPY | Facility: CLINIC | Age: 35
End: 2025-03-24
Payer: COMMERCIAL

## 2025-03-24 DIAGNOSIS — M75.122 NONTRAUMATIC COMPLETE TEAR OF ROTATOR CUFF, LEFT: ICD-10-CM

## 2025-03-24 DIAGNOSIS — Z98.890 S/P LEFT ROTATOR CUFF REPAIR: Primary | ICD-10-CM

## 2025-03-24 PROCEDURE — 97110 THERAPEUTIC EXERCISES: CPT | Mod: GP

## 2025-04-01 ENCOUNTER — THERAPY VISIT (OUTPATIENT)
Dept: PHYSICAL THERAPY | Facility: CLINIC | Age: 35
End: 2025-04-01
Payer: COMMERCIAL

## 2025-04-01 DIAGNOSIS — M75.122 NONTRAUMATIC COMPLETE TEAR OF ROTATOR CUFF, LEFT: ICD-10-CM

## 2025-04-01 DIAGNOSIS — Z98.890 S/P LEFT ROTATOR CUFF REPAIR: Primary | ICD-10-CM

## 2025-04-01 PROCEDURE — 97140 MANUAL THERAPY 1/> REGIONS: CPT | Mod: GP | Performed by: PHYSICAL THERAPIST

## 2025-04-01 PROCEDURE — 97110 THERAPEUTIC EXERCISES: CPT | Mod: GP | Performed by: PHYSICAL THERAPIST

## 2025-04-03 ENCOUNTER — THERAPY VISIT (OUTPATIENT)
Dept: PHYSICAL THERAPY | Facility: CLINIC | Age: 35
End: 2025-04-03
Payer: COMMERCIAL

## 2025-04-03 DIAGNOSIS — Z98.890 S/P LEFT ROTATOR CUFF REPAIR: Primary | ICD-10-CM

## 2025-04-03 DIAGNOSIS — M75.122 NONTRAUMATIC COMPLETE TEAR OF ROTATOR CUFF, LEFT: ICD-10-CM

## 2025-04-03 NOTE — PROGRESS NOTES
Pt returns to PT for 5th PT session s/p RC repair. Pt reports overall, his pain has gone down, though he still continues to get some pain with exercises, especially when trying to increase ROM - pt able to get to ~130 degrees PROM into flex/scaption and ~20 IR/ER in scapular plane (IR/ER not formally measured today). Pt reports that he has not been wearing his sling the past 3 days, which has felt OK overall, though he does notice more fatigue and soreness - edu provided to wear again if he feels like the soreness/pain is significantly increased to allow recovery. Pt trial of AAROM flexion today, which increased pain to 6/10 - not added to HEP at this time - will continue to assess and progress per protocol.     04/03/25 0500   Appointment Info   Signing clinician's name / credentials Diana Lucero, PT, DPT   Total/Authorized Visits 8 E+T   Visits Used 5   Medical Diagnosis Nontraumatic complete tear of rotator cuff,   PT Tx Diagnosis Nontraumatic complete tear of rotator cuff,   Precautions/Limitations follow [facility] standard RC repair postop protocol   Other pertinent information follow [facility] standard RC repair postop protocol   Progress Note/Certification   Onset of illness/injury or Date of Surgery 02/25/25   Therapy Frequency 1x/week   Predicted Duration for 4 weeks tapering down to 2x/month for 8 weeks   Progress Note Due Date 05/27/25   Progress Note Completed Date 03/04/25   Supervision   Student Supervision Direct supervision provided;Therapy services provided with the co-signing licensed therapist guiding and directing the services, and providing the skilled judgement and assessment throughout the session   PT Goal 1   Goal Description Patient will have unrestricted reaching with 0/10 pain.   Rationale to maximize safety and independence with performance of ADLs and functional tasks   Goal Progress improving ROM   Target Date 05/27/25   Subjective Report   Subjective Report Patient reports pain is  improving. Down to 2 ibuprofen a day and icing. Improving ROM. Trying to wean self off sling by week 6 (april 8). Exercises going good - could be more consistent.   Objective Measures   Objective Measures Objective Measure 1   Objective Measure 1   Objective Measure shoulder PROM   Details FL = 130, scaption: 130 ER and IR in scapular plane = 20   Treatment Interventions (PT)   Interventions Therapeutic Procedure/Exercise   Therapeutic Procedure/Exercise   Therapeutic Procedures: strength, endurance, ROM, flexibility minutes (19902) 25   Therapeutic Procedures Ther Proc 2   Ther Proc 1 PROM   Ther Proc 1 - Details FL, SCAPTION, ER, IR - increase at end ranges of motion (~130 scaptio/flexion)   Ther Proc 2 Patient Education   Ther Proc 2 - Details Edu on sling use - pt reports not using it for the past few days - edu to use it again if it feels like pain is increasing or shoulder just feels tired; edu on not doing AROM yet, especially because PROM is still a bit uncomfortable; discussed not perform AAROM yet, as pt has difficulty letting R arm do most of the work and gets 6/10 pain (no sharp pain) with trial of AAROM flexion with dowel   PTRx Ther Proc 1 Pendulum/Codmans   PTRx Ther Proc 1 - Details x10 forward/backward for review - edu to do this more often as needed, especially when not wearing the sling to decrease tension   PTRx Ther Proc 2 Shoulder flexion AAROM with dowel   PTRx Ther Proc 2 - Details 6/10 pain - not added to HEP today   PTRx Ther Proc 3 Forearm Active Range of Motion Combined Pronation and Supination   PTRx Ther Proc 3 - Details HEP   PTRx Ther Proc 4 Wrist Active Range of Motion Extension/Flexion in Supination   PTRx Ther Proc 4 - Details HEP   PTRx Ther Proc 5 Forearm Active Range of Motion Supination   PTRx Ther Proc 5 - Details HEP   PTRx Ther Proc 6 Scapular Retraction/Depression   PTRx Ther Proc 6 - Details 1x10 for review   PTRx Ther Proc 7 Upper Trapezius Stretch   PTRx Ther Proc 7 -  Details HEP   PTRx Ther Proc 8 Levator Scapulae Stretch   PTRx Ther Proc 8 - Details HEP   PTRx Ther Proc 9 Passive Shoulder Flexion   PTRx Ther Proc 9 - Details 1x10 - edu to try to relax shoulders and let the legs do the work   PTRx Ther Proc 10 Wand Shoulder External Rotation in Standing   PTRx Ther Proc 10 - Details 1x5 - edu to not push into pain   PTRx Ther Proc 11 Periscapular Table Slide Flexion   PTRx Ther Proc 11 - Details not today   Skilled Intervention Visual, tactile and verbal cues for correct technique of exercises.   Patient Response/Progress Pt tolerated OK - continues to get some discomfort with PROM, especially on descend   Manual Therapy   Manual Therapy 1 - Details supine, biceps, upper trap, pectoral region, axilla, anterior/posterior shoulder   Skilled Intervention not today   Education   Learner/Method Patient;No Barriers to Learning   Plan   Plan for next session Isotonic strengthening; AAROM as tolerated   Comments   Comments Pt 15 min late today   Total Session Time   Timed Code Treatment Minutes 25   Total Treatment Time (sum of timed and untimed services) 25         PLAN  Continue therapy per current plan of care.    Beginning/End Dates of Progress Note Reporting Period:  03/04/25 to 04/03/2025    Referring Provider:  Jacky Stout

## 2025-04-04 ENCOUNTER — OFFICE VISIT (OUTPATIENT)
Dept: ORTHOPEDICS | Facility: CLINIC | Age: 35
End: 2025-04-04
Payer: COMMERCIAL

## 2025-04-04 DIAGNOSIS — Z98.890 S/P LEFT ROTATOR CUFF REPAIR: Primary | ICD-10-CM

## 2025-04-04 PROCEDURE — 99024 POSTOP FOLLOW-UP VISIT: CPT | Performed by: STUDENT IN AN ORGANIZED HEALTH CARE EDUCATION/TRAINING PROGRAM

## 2025-04-04 NOTE — LETTER
"4/4/2025      Franco Raymond  [address]  [facility] [address]      Dear Colleague,    Thank you for referring your patient, Franco Raymond, to the [facility] [place]. Please see a copy of my visit note below.    CC: 6 weeks s/p Left Shoulder RCR.    HPI: Patient is 6 weeks status-post a left shoulder RCR.  Overall, he is progressing well. They have weaned from narcotic pain medication.  They are continuing physical therapy at our [place] location.  They are weaning from the UltraSling around the house.    Objective:   PE:  LUE:   Well healed surgical incision about the shoulder.     PROM: 110 forward flexion, 90 abduction, 30 ER, .   AROM: 90 forward flexion.    2+ radial pulse.  SILT: axillary, radial, median, and ulnar nerve distributions.    A/P:  Patient is now 6 weeks status-post Left Shoulder RCR.    -continue physical therapy per the \"Rotator Cuff Repair - Standard\" postoperative protocol, focusing on restoring full AROM and PROM. Will start strengthen at 10 weeks.  -May discontinue sling    Patient will follow-up in 6 weeks in clinic for range of motion check.    Jacky Stout MD    [facility]   Department of Orthopedic Surgery      Disclaimer: This note consists of symbols derived from keyboarding, dictation and/or voice recognition software. As a result, there may be errors in the script that have gone undetected. Please consider this when interpreting information found in this chart.         Again, thank you for allowing me to participate in the care of your patient.        Sincerely,        Jacky Stout MD    Electronically signed"

## 2025-04-05 NOTE — PROGRESS NOTES
"CC: 6 weeks s/p Left Shoulder RCR.    HPI: Patient is 6 weeks status-post a left shoulder RCR.  Overall, he is progressing well. They have weaned from narcotic pain medication.  They are continuing physical therapy at our [place] location.  They are weaning from the UltraSling around the house.    Objective:   PE:  LUE:   Well healed surgical incision about the shoulder.     PROM: 110 forward flexion, 90 abduction, 30 ER, .   AROM: 90 forward flexion.    2+ radial pulse.  SILT: axillary, radial, median, and ulnar nerve distributions.    A/P:  Patient is now 6 weeks status-post Left Shoulder RCR.    -continue physical therapy per the \"Rotator Cuff Repair - Standard\" postoperative protocol, focusing on restoring full AROM and PROM. Will start strengthen at 10 weeks.  -May discontinue sling    Patient will follow-up in 6 weeks in clinic for range of motion check.    Jacky Stout MD    [facility]   Department of Orthopedic Surgery      Disclaimer: This note consists of symbols derived from keyboarding, dictation and/or voice recognition software. As a result, there may be errors in the script that have gone undetected. Please consider this when interpreting information found in this chart.     "

## 2025-04-07 ENCOUNTER — THERAPY VISIT (OUTPATIENT)
Dept: PHYSICAL THERAPY | Facility: CLINIC | Age: 35
End: 2025-04-07
Payer: COMMERCIAL

## 2025-04-07 DIAGNOSIS — Z98.890 S/P LEFT ROTATOR CUFF REPAIR: Primary | ICD-10-CM

## 2025-04-07 DIAGNOSIS — M75.122 NONTRAUMATIC COMPLETE TEAR OF ROTATOR CUFF, LEFT: ICD-10-CM

## 2025-04-07 PROCEDURE — 97140 MANUAL THERAPY 1/> REGIONS: CPT | Mod: GP | Performed by: PHYSICAL THERAPIST

## 2025-04-07 PROCEDURE — 97110 THERAPEUTIC EXERCISES: CPT | Mod: GP | Performed by: PHYSICAL THERAPIST

## 2025-04-10 ENCOUNTER — THERAPY VISIT (OUTPATIENT)
Dept: PHYSICAL THERAPY | Facility: CLINIC | Age: 35
End: 2025-04-10
Payer: COMMERCIAL

## 2025-04-10 DIAGNOSIS — M75.122 NONTRAUMATIC COMPLETE TEAR OF ROTATOR CUFF, LEFT: ICD-10-CM

## 2025-04-10 DIAGNOSIS — Z98.890 S/P LEFT ROTATOR CUFF REPAIR: Primary | ICD-10-CM

## 2025-04-10 NOTE — PROGRESS NOTES
PLAN  Continue therapy per current plan of care.    Beginning/End Dates of Progress Note Reporting Period:  03/04/25 to 04/10/2025    Referring Provider:  Jacky Stout       04/10/25 0500   Appointment Info   Signing clinician's name / credentials Rocky Barrientos DPT   Total/Authorized Visits 21 E+T   Visits Used 7   Medical Diagnosis Nontraumatic complete tear of rotator cuff,   PT Tx Diagnosis Nontraumatic complete tear of rotator cuff,   [facility]ations [facility]   Other pertinent information follow [facility] standard RC repair postop protocol   Progress Note/Certification   Onset of illness/injury or Date of Surgery 02/25/25   Therapy Frequency 2x/week   Predicted Duration 2 weeks then tapering down to 1x per week for 10 weeks   Progress Note Due Date 05/27/25   Progress Note Completed Date 03/04/25   Supervision   Student Supervision Direct supervision provided;Therapy services provided with the co-signing licensed therapist guiding and directing the services, and providing the skilled judgement and assessment throughout the session   PT Goal 1   Goal Description Patient will have unrestricted reaching with 0/10 pain.   Rationale to maximize safety and independence with performance of ADLs and functional tasks   Goal Progress improving ROM   Target Date 05/27/25   Subjective Report   Subjective Report Patient reports shoulder has been doing better overall. ROM is slowly improving. Getting used to not having sling on. Pain symptoms are better. Down to 2 ibuprofens per day and using ice for pain relief. Exercises are going fine. Patient reports he could be doing them more. Trying to ease into stretches too versus going hard right away. Sleeping better lately too.   Objective Measures   Objective Measures Objective Measure 1;Objective Measure 2   Objective Measure 1   Objective Measure shoulder PROM   Details FL = 130, scaption: 130 ER and IR in scapular plane = 30    Objective Measure 2   Details left shoulder AROM FL = 90   Treatment Interventions (PT)   Interventions Therapeutic Procedure/Exercise   Therapeutic Procedure/Exercise   Therapeutic Procedures: strength, endurance, ROM, flexibility minutes (43697) 30   Therapeutic Procedures Ther Proc 2   Ther Proc 1 PROM   Ther Proc 1 - Details FL, ABD, ER, IR   Ther Proc 2 Patient Education   Ther Proc 2 - Details Edu on sling use - pt reports not using it for the past few days - edu to use it again if it feels like pain is increasing or shoulder just feels tired; edu on not doing AROM yet, especially because PROM is still a bit uncomfortable; discussed not perform AAROM yet, as pt has difficulty letting R arm do most of the work and gets 6/10 pain (no sharp pain) with trial of AAROM flexion with dowel   PTRx Ther Proc 1 Pendulum/Codmans   PTRx Ther Proc 1 - Details 10x each   PTRx Ther Proc 2 Scapular Retraction/Depression   PTRx Ther Proc 2 - Details 10x   PTRx Ther Proc 3 Upper Trapezius Stretch   PTRx Ther Proc 3 - Details HEP   PTRx Ther Proc 4 Levator Scapulae Stretch   PTRx Ther Proc 4 - Details HEP   PTRx Ther Proc 5 Wand Shoulder External Rotation in Standing   PTRx Ther Proc 5 - Details 5-10x multiple versions (back against wall sitting with hand on table)   PTRx Ther Proc 6 Periscapular Table Slide Flexion   PTRx Ther Proc 6 - Details 10x   PTRx Ther Proc 7 Periscapular Table Slide Abduction   PTRx Ther Proc 7 - Details 10x   PTRx Ther Proc 8 Pulley Shoulder Flexion   PTRx Ther Proc 8 - Details 10x   PTRx Ther Proc 9 Pulley Shoulder Scaption   PTRx Ther Proc 9 - Details 10x   PTRx Ther Proc 10 Pulley Shoulder Abduction   PTRx Ther Proc 10 - Details 10x   PTRx Ther Proc 11 Shoulder Flexion Stretch with Stick   PTRx Ther Proc 11 - Details 10x   Skilled Intervention Visual, tactile and verbal cues for correct technique of exercises.   Patient Response/Progress Pt tolerated OK - continues to get some discomfort with PROM,  especially on descend   PTRx Ther Proc 12 Wand Shoulder Flexion Supine   PTRx Ther Proc 12 - Details 10x   Manual Therapy   Manual Therapy: Mobilization, MFR, MLD, friction massage minutes (43903) 8   Manual Therapy 1 - Details supine, biceps, upper trap, pectoral region, axilla, anterior/posterior shoulder   Skilled Intervention varying pressure based on patient response   Patient Response/Progress reduced pain and increased ROM   Education   Learner/Method Patient;No Barriers to Learning   Plan   Plan for next session stick FL, scaption, ABD in standing?   Total Session Time   Timed Code Treatment Minutes 38   Total Treatment Time (sum of timed and untimed services) 38

## 2025-05-07 ENCOUNTER — THERAPY VISIT (OUTPATIENT)
Dept: PHYSICAL THERAPY | Facility: CLINIC | Age: 35
End: 2025-05-07
Payer: COMMERCIAL

## 2025-05-07 DIAGNOSIS — Z98.890 S/P LEFT ROTATOR CUFF REPAIR: Primary | ICD-10-CM

## 2025-05-07 DIAGNOSIS — M75.122 NONTRAUMATIC COMPLETE TEAR OF ROTATOR CUFF, LEFT: ICD-10-CM

## 2025-05-07 PROCEDURE — 97112 NEUROMUSCULAR REEDUCATION: CPT | Mod: GP | Performed by: PHYSICAL THERAPIST

## 2025-05-07 PROCEDURE — 97110 THERAPEUTIC EXERCISES: CPT | Mod: GP | Performed by: PHYSICAL THERAPIST

## 2025-05-15 ENCOUNTER — THERAPY VISIT (OUTPATIENT)
Dept: PHYSICAL THERAPY | Facility: CLINIC | Age: 35
End: 2025-05-15
Payer: COMMERCIAL

## 2025-05-15 DIAGNOSIS — M75.122 NONTRAUMATIC COMPLETE TEAR OF ROTATOR CUFF, LEFT: ICD-10-CM

## 2025-05-15 DIAGNOSIS — Z98.890 S/P LEFT ROTATOR CUFF REPAIR: Primary | ICD-10-CM

## 2025-05-19 ENCOUNTER — OFFICE VISIT (OUTPATIENT)
Dept: ORTHOPEDICS | Facility: CLINIC | Age: 35
End: 2025-05-19
Payer: COMMERCIAL

## 2025-05-19 DIAGNOSIS — Z98.890 S/P LEFT ROTATOR CUFF REPAIR: Primary | ICD-10-CM

## 2025-05-19 PROCEDURE — 99024 POSTOP FOLLOW-UP VISIT: CPT | Performed by: STUDENT IN AN ORGANIZED HEALTH CARE EDUCATION/TRAINING PROGRAM

## 2025-05-19 NOTE — LETTER
"5/19/2025      Franco Raymond  [address]  [facility] [address]      Dear Colleague,    Thank you for referring your patient, Franco Raymond, to the [facility] [place]. Please see a copy of my visit note below.    CC: 3 month s/p Left Shoulder RCR.    HPI: Patient is 3 month status-post a left shoulder rotator cuff repair.  Overall, he is doing well, although he notes some slight stiffness with overhead activity and behind his back. They have returned to ADLS without pain.  They are continuing physical therapy at our [place] location.  They are working on strengthen and endurance.    Objective:   PE:  LUE:   Well healed surgical incision about the shoulder.     PROM: 160 forward flexion, 150 abduction, 45 ER, BL IR.   AROM: 160 forward flexion, 150 abduction, 45 ER.    5/5 strength in flex/adb/ER/IR  2+ radial pulse.  SILT: axillary, radial, median, and ulnar nerve distributions.    A/P:  Patient is now 3 months status-post Left Shoulder RCR.    -continue physical therapy per the \"Rotator Cuff Repair - Standard\" postoperative protocol, focusing on strengthening and .  -10lbs lifting restriction, may increase with PT and strength and symptoms allow.    Patient will follow-up in 3 months in clinic.    Jacky Stout MD    [facility]   Department of Orthopedic Surgery      Disclaimer: This note consists of symbols derived from keyboarding, dictation and/or voice recognition software. As a result, there may be errors in the script that have gone undetected. Please consider this when interpreting information found in this chart.       Again, thank you for allowing me to participate in the care of your patient.        Sincerely,        Jacky Stout MD    Electronically signed"

## 2025-05-19 NOTE — PROGRESS NOTES
"CC: 3 month s/p Left Shoulder RCR.    HPI: Patient is 3 month status-post a left shoulder rotator cuff repair.  Overall, he is doing well, although he notes some slight stiffness with overhead activity and behind his back. They have returned to ADLS without pain.  They are continuing physical therapy at our [place] location.  They are working on strengthen and endurance.    Objective:   PE:  LUE:   Well healed surgical incision about the shoulder.     PROM: 160 forward flexion, 150 abduction, 45 ER, BL IR.   AROM: 160 forward flexion, 150 abduction, 45 ER.    5/5 strength in flex/adb/ER/IR  2+ radial pulse.  SILT: axillary, radial, median, and ulnar nerve distributions.    A/P:  Patient is now 3 months status-post Left Shoulder RCR.    -continue physical therapy per the \"Rotator Cuff Repair - Standard\" postoperative protocol, focusing on strengthening and .  -10lbs lifting restriction, may increase with PT and strength and symptoms allow.    Patient will follow-up in 3 months in clinic.    Jacky Stout MD    [facility]   Department of Orthopedic Surgery      Disclaimer: This note consists of symbols derived from keyboarding, dictation and/or voice recognition software. As a result, there may be errors in the script that have gone undetected. Please consider this when interpreting information found in this chart.     "

## 2025-05-22 ENCOUNTER — THERAPY VISIT (OUTPATIENT)
Dept: PHYSICAL THERAPY | Facility: CLINIC | Age: 35
End: 2025-05-22
Payer: COMMERCIAL

## 2025-05-22 DIAGNOSIS — Z98.890 S/P LEFT ROTATOR CUFF REPAIR: Primary | ICD-10-CM

## 2025-05-22 DIAGNOSIS — M75.122 NONTRAUMATIC COMPLETE TEAR OF ROTATOR CUFF, LEFT: ICD-10-CM

## 2025-05-29 ENCOUNTER — THERAPY VISIT (OUTPATIENT)
Dept: PHYSICAL THERAPY | Facility: CLINIC | Age: 35
End: 2025-05-29
Payer: COMMERCIAL

## 2025-05-29 DIAGNOSIS — Z98.890 S/P LEFT ROTATOR CUFF REPAIR: Primary | ICD-10-CM

## 2025-05-29 DIAGNOSIS — M75.122 NONTRAUMATIC COMPLETE TEAR OF ROTATOR CUFF, LEFT: ICD-10-CM

## 2025-06-26 ENCOUNTER — THERAPY VISIT (OUTPATIENT)
Dept: PHYSICAL THERAPY | Facility: CLINIC | Age: 35
End: 2025-06-26
Payer: COMMERCIAL

## 2025-06-26 DIAGNOSIS — Z98.890 S/P LEFT ROTATOR CUFF REPAIR: Primary | ICD-10-CM

## 2025-06-26 DIAGNOSIS — M75.122 NONTRAUMATIC COMPLETE TEAR OF ROTATOR CUFF, LEFT: ICD-10-CM

## 2025-06-26 NOTE — PROGRESS NOTES
PLAN  Continue therapy per current plan of care.    Beginning/End Dates of Progress Note Reporting Period:  04/10/25 to 06/26/2025    Referring Provider:  Jacky Stout       06/26/25 0500   Appointment Info   Signing clinician's name / credentials Rocky Barrientos DPT   Total/Authorized Visits 21 E+T   Visits Used 15   Medical Diagnosis Nontraumatic complete tear of rotator cuff,   PT Tx Diagnosis Nontraumatic complete tear of rotator cuff,   Precautions/Limitations follow [facility] standard RC repair postop protocol   Other pertinent information [facility]   Progress Note/Certification   Onset of illness/injury or Date of Surgery 02/25/25   Therapy Frequency 2x per month   Predicted Duration 12 weeks   Progress Note Due Date 05/27/25   Progress Note Completed Date 04/10/25   Supervision   Student Supervision Direct supervision provided;Therapy services provided with the co-signing licensed therapist guiding and directing the services, and providing the skilled judgement and assessment throughout the session   PT Goal 1   Goal Description Patient will have unrestricted reaching with 0/10 pain.   Rationale to maximize safety and independence with performance of ADLs and functional tasks   Goal Progress improving ROM but still not 100% and painfree; extended due date   Target Date 09/18/25   Subjective Report   Subjective Report Patient reports improving pain symptoms, ROM, strength and overall UE function. Exercises are going well. Still discomfort with lying on side when sleeping. Also, still challenging carrying > 30 lbs.   Objective Measures   Objective Measures Objective Measure 1;Objective Measure 2   Objective Measure 1   Details left shoulder AROM FL = 130, ABD = 130, ER (neutral) = 42, IR = L4-L5   Treatment Interventions (PT)   Interventions Therapeutic Procedure/Exercise;Manual Therapy   Therapeutic Procedure/Exercise   Therapeutic Procedures: strength, endurance, ROM,  flexibility minutes (19294) 23   Therapeutic Procedures Ther Proc 2;Ther Proc 3;Ther Proc 4   Ther Proc 1 PROM   Ther Proc 1 - Details FL, ABD, ER, IR, cross body, multiple reps each   Ther Proc 2 Patient Education   Ther Proc 2 - Details Edu to not over participate in jujitsu activities and to stick to exercises on program   Ther Proc 3 ube   Ther Proc 3 - Details 5'   Ther Proc 4 pulley   Ther Proc 4 - Details FL, scaption, ABD, 10x each   PTRx Ther Proc 1 Wall Slide Shoulder Flexion   PTRx Ther Proc 1 - Details 10x   PTRx Ther Proc 2 Wall Slides Abduction   PTRx Ther Proc 2 - Details 10x   PTRx Ther Proc 3 Wand Shoulder External Rotation in Standing   PTRx Ther Proc 3 - Details reviewed multiple versions (back against wall sitting with hand on table)   PTRx Ther Proc 4 Standing Shoulder Extension With Overpressure   PTRx Ther Proc 4 - Details 10x   PTRx Ther Proc 5 Shoulder Horizontal Adduction Stretch   PTRx Ther Proc 5 - Details HEP   PTRx Ther Proc 6 Standing Posterior Capsule Stretch A/B    PTRx Ther Proc 6 - Details 10x   PTRx Ther Proc 7 Internal Rotation Behind Back with Overpressure   PTRx Ther Proc 7 - Details 10x   PTRx Ther Proc 8 Four Corner Stretch Passive Shoulder Abduction With Trunk Rotation   PTRx Ther Proc 8 - Details reviewed okay to hold for 10 seconds   PTRx Ther Proc 9 Four Corner Stretch External Rotation With Abduction   PTRx Ther Proc 9 - Details reviewed okay to hold for 10 seconds   PTRx Ther Proc 10 Shoulder Flexion Stretch with Stick   PTRx Ther Proc 10 - Details HEP   PTRx Ther Proc 11 Shoulder Abduction   PTRx Ther Proc 11 - Details 2 lbs 15x   PTRx Ther Proc 12 Shoulder Flexion   PTRx Ther Proc 12 - Details 2 lbs 15x   PTRx Ther Proc 13 Shoulder Theraband Internal Rotation   PTRx Ther Proc 13 - Details black reviewed   PTRx Ther Proc 14 Shoulder External Rotation Sidelying   PTRx Ther Proc 14 - Details 2 lbs 15x   PTRx Ther Proc 15 Elbow Strengthening Isotonic Flexion   PTRx Ther  Proc 15 - Details 5 lbs reviewed   Skilled Intervention Visual, tactile and verbal cues for correct technique of exercises.   Patient Response/Progress Pt tolerated OK - continues to get some discomfort with PROM, especially on descend   Neuromuscular Re-education   Neuromuscular re-ed of mvmt, balance, coord, kinesthetic sense, posture, proprioception minutes (46861) 8   Neuromuscular Re-education Neuro Re-ed 2   Neuro Re-ed 1 Rows   Neuro Re-ed 1 - Details 1x15 RTB - verbal and visual cues for good form and muscle activation - very light load to start to initiate more scap stab - edu to perform every other day for 2 sets, 15 reps   Neuro Re-ed 2 Prone punches   Neuro Re-ed 2 - Details 1x15 to try to engage shoulder blade - pt to initiate 4/22   PTRx Neuro Re-ed 1 Supine Ceiling Punch   PTRx Neuro Re-ed 1 - Details HEP   PTRx Neuro Re-ed 2 Shoulder Theraband Rows   PTRx Neuro Re-ed 2 - Details black reviewed   PTRx Neuro Re-ed 3 Shoulder Theraband Low Row/Pulldown   PTRx Neuro Re-ed 3 - Details black reviewed   PTRx Neuro Re-ed 4 Push-Up Plus At Counter   PTRx Neuro Re-ed 4 - Details 15x   Skilled Intervention scab stab   Patient Response/Progress pt able to perform with no pain today   Manual Therapy   Manual Therapy 1 - Details STM L UT and middle trap/rhomboids   Skilled Intervention varying pressure based on patient response   Patient Response/Progress reduced pain and increased ROM   Education   Learner/Method Patient;No Barriers to Learning   Plan   Plan for next session strengthening   Total Session Time   Timed Code Treatment Minutes 31   Total Treatment Time (sum of timed and untimed services) 31

## 2025-07-29 ENCOUNTER — THERAPY VISIT (OUTPATIENT)
Dept: PHYSICAL THERAPY | Facility: CLINIC | Age: 35
End: 2025-07-29
Payer: COMMERCIAL

## 2025-07-29 DIAGNOSIS — M75.122 NONTRAUMATIC COMPLETE TEAR OF ROTATOR CUFF, LEFT: ICD-10-CM

## 2025-07-29 DIAGNOSIS — Z98.890 S/P LEFT ROTATOR CUFF REPAIR: Primary | ICD-10-CM

## 2025-07-29 PROCEDURE — 97110 THERAPEUTIC EXERCISES: CPT | Mod: GP | Performed by: PHYSICAL THERAPIST

## 2025-07-29 PROCEDURE — 97112 NEUROMUSCULAR REEDUCATION: CPT | Mod: GP | Performed by: PHYSICAL THERAPIST

## 2025-07-29 NOTE — PROGRESS NOTES
PLAN  Continue therapy per current plan of care.    Beginning/End Dates of Progress Note Reporting Period:  04/10/25 to 07/29/2025    Referring Provider:  Jacky Stout       07/29/25 0500   Appointment Info   Signing clinician's name / credentials Rocky Barrientos DPT   Total/Authorized Visits 21 E+T   Visits Used 16   Medical Diagnosis Nontraumatic complete tear of rotator cuff,   PT Tx Diagnosis Nontraumatic complete tear of rotator cuff,   Precautions/Limitations follow [facility] standard RC repair postop protocol   Other pertinent information follow [facility] standard RC repair postop protocol   Progress Note/Certification   Onset of illness/injury or Date of Surgery 02/25/25   Therapy Frequency 1x per month   Predicted Duration 12 weeks   Progress Note Due Date 05/27/25   Progress Note Completed Date 04/10/25   Supervision   Student Supervision Direct supervision provided;Therapy services provided with the co-signing licensed therapist guiding and directing the services, and providing the skilled judgement and assessment throughout the session   GOALS   PT Goals 2   PT Goal 1   Goal Description Patient will have unrestricted reaching with 0/10 pain.   Rationale to maximize safety and independence with performance of ADLs and functional tasks   Target Date 09/18/25   Date Met 07/29/25   PT Goal 2   Goal Description Patient will be able to support at least 30% of body weight on left UE   Rationale to maximize safety and independence with performance of ADLs and functional tasks   Target Date 10/21/25   Subjective Report   Subjective Report Patient reports improving shoulder flexibility and strength. Still some discomfort with weightbearing on left shoulder   Objective Measures   Objective Measures Objective Measure 1;Objective Measure 2   Objective Measure 1   Details left shoulder AROM FL = 135, ABD = 138, ER (neutral) = 55, IR = L4-L5   Treatment Interventions (PT)   Interventions Therapeutic Procedure/Exercise;Manual  Therapy   Therapeutic Procedure/Exercise   Therapeutic Procedures: strength, endurance, ROM, flexibility minutes (23045) 17   Therapeutic Procedures Ther Proc 2;Ther Proc 3;Ther Proc 4   Ther Proc 1 PROM   Ther Proc 1 - Details FL, ABD, ER, IR, cross body, multiple reps each   Ther Proc 2 Patient Education   Ther Proc 2 - Details Edu to not over participate in [employer] activities and to stick to exercises on program   Ther Proc 3 ube   Ther Proc 3 - Details 5'   Ther Proc 4 pulley   Ther Proc 4 - Details FL, scaption, ABD, 10x each   PTRx Ther Proc 1 Wall Slide Shoulder Flexion   PTRx Ther Proc 1 - Details 10x   PTRx Ther Proc 2 Wall Slides Abduction   PTRx Ther Proc 2 - Details 10x   PTRx Ther Proc 3 Wand Shoulder External Rotation in Standing   PTRx Ther Proc 3 - Details reviewed multiple versions (back against wall sitting with hand on table)   PTRx Ther Proc 4 Standing Shoulder Extension With Overpressure   PTRx Ther Proc 4 - Details 10x   PTRx Ther Proc 5 Shoulder Horizontal Adduction Stretch   PTRx Ther Proc 5 - Details HEP   PTRx Ther Proc 6 Standing Posterior Capsule Stretch A/B    PTRx Ther Proc 6 - Details 10x   PTRx Ther Proc 7 Internal Rotation Behind Back with Overpressure   PTRx Ther Proc 7 - Details 10x   PTRx Ther Proc 8 Four Corner Stretch Passive Shoulder Abduction With Trunk Rotation   PTRx Ther Proc 8 - Details 10x okay to hold for 10 seconds   PTRx Ther Proc 9 Four Corner Stretch External Rotation With Abduction   PTRx Ther Proc 9 - Details 10x okay to hold for 10 seconds   PTRx Ther Proc 10 Shoulder Flexion Stretch with Stick   PTRx Ther Proc 10 - Details HEP   PTRx Ther Proc 11 Shoulder Abduction   PTRx Ther Proc 11 - Details 2-3 lbs reviewed   PTRx Ther Proc 12 Shoulder Flexion   PTRx Ther Proc 12 - Details 2-3 lbs 15x   PTRx Ther Proc 13 Shoulder Theraband Internal Rotation   PTRx Ther Proc 13 - Details black reviewed   PTRx Ther Proc 14 Shoulder External Rotation Sidelying   PTRx Ther Proc  14 - Details 2-3 lbs 15x   PTRx Ther Proc 15 Elbow Strengthening Isotonic Flexion   PTRx Ther Proc 15 - Details 5 lbs reviewed   PTRx Ther Proc 16 Shoulder Press Overhead   PTRx Ther Proc 16 - Details 2-3 lbs 15x   Skilled Intervention Visual, tactile and verbal cues for correct technique of exercises.   Patient Response/Progress Pt tolerated OK - continues to get some discomfort with PROM, especially on descend   Neuromuscular Re-education   Neuromuscular re-ed of mvmt, balance, coord, kinesthetic sense, posture, proprioception minutes (04599) 8   Neuromuscular Re-education Neuro Re-ed 2   Neuro Re-ed 1 Rows   Neuro Re-ed 1 - Details 1x15 RTB - verbal and visual cues for good form and muscle activation - very light load to start to initiate more scap stab - edu to perform every other day for 2 sets, 15 reps   Neuro Re-ed 2 Prone punches   Neuro Re-ed 2 - Details 1x15 to try to engage shoulder blade - pt to initiate 4/22   PTRx Neuro Re-ed 1 Supine Ceiling Punch   PTRx Neuro Re-ed 1 - Details HEP   PTRx Neuro Re-ed 2 Shoulder Theraband Rows   PTRx Neuro Re-ed 2 - Details black reviewed   PTRx Neuro Re-ed 3 Shoulder Theraband Low Row/Pul[place]   PTRx Neuro Re-ed 3 - Details black reviewed   PTRx Neuro Re-ed 4 Push-Up Plus At Counter   PTRx Neuro Re-ed 4 - Details reviewed on floor   Skilled Intervention scab stab   Patient Response/Progress pt able to perform with no pain today   Manual Therapy   Manual Therapy 1 - Details STM L UT and middle trap/rhomboids   Skilled Intervention varying pressure based on patient response   Patient Response/Progress reduced pain and increased ROM   Education   Learner/Method Patient;No Barriers to Learning   Plan   Plan for next session strengthening   Total Session Time   Timed Code Treatment Minutes 25   Total Treatment Time (sum of timed and untimed services) 25

## 2025-08-18 ENCOUNTER — OFFICE VISIT (OUTPATIENT)
Dept: ORTHOPEDICS | Facility: CLINIC | Age: 35
End: 2025-08-18
Payer: COMMERCIAL

## 2025-08-18 DIAGNOSIS — Z98.890 S/P LEFT ROTATOR CUFF REPAIR: Primary | ICD-10-CM

## 2025-08-18 PROCEDURE — 99213 OFFICE O/P EST LOW 20 MIN: CPT | Performed by: STUDENT IN AN ORGANIZED HEALTH CARE EDUCATION/TRAINING PROGRAM

## (undated) DEVICE — GLOVE UNDER INDICATOR PI SZ 7.0 LF 41670

## (undated) DEVICE — SU VICRYL+ 0 8-18 CT1/CR UND VCP840D

## (undated) DEVICE — ESU ELEC BLADE 2.75" COATED/INSULATED E1455

## (undated) DEVICE — SOL ISOPROPYL RUBBING ALCOHOL USP 70% 4OZ HDX-20 I0020

## (undated) DEVICE — BLADE KNIFE SURG 11 371111

## (undated) DEVICE — SU MONOCRYL+ 4-0 18IN PS2 UND MCP496G

## (undated) DEVICE — DECANTER VIAL 2006S

## (undated) DEVICE — WRAP THRP UNV LMBR GEL SFT SUB-2

## (undated) DEVICE — GLOVE UNDER INDICATOR PI SZ 6.5 LF 41665

## (undated) DEVICE — Device

## (undated) DEVICE — CUSTOM PACK LUMBAR FUSION SNE5BLFHEA

## (undated) DEVICE — PITCHER STERILE 1000ML  SSK9004A

## (undated) DEVICE — SOL WATER IRRIG 1000ML BOTTLE 2F7114

## (undated) DEVICE — ESU PENCIL SMOKE EVAC W/ROCKER SWITCH 0703-047-000

## (undated) DEVICE — TRAY PREP DRY SKIN SCRUB 067

## (undated) DEVICE — NEEDLE SPINAL DISP 22GA X 3.5" QUINCKE 333320

## (undated) DEVICE — SUTURE VICRYL+ 2-0 18 CT1/CR VLT VCP839D

## (undated) DEVICE — ESU GROUND PAD ADULT REM W/15' CORD E7507DB

## (undated) DEVICE — RX SURGIFLO HEMOSTATIC MATRIX W/THROMBIN 8ML 2994

## (undated) DEVICE — DRSG PRIMAPORE 03 1/8X6" 66000318

## (undated) DEVICE — MARKER SURG SKIN 2 TIP STRL SPP99DT2AA

## (undated) DEVICE — SPONGE KITNER DISSECTING 7102*

## (undated) DEVICE — DRAPE STERI TOWEL LG 1010

## (undated) DEVICE — SUCTION MANIFOLD NEPTUNE 2 SYS 1 PORT 702-025-000

## (undated) DEVICE — POSITIONER ARM CRADLE LAMINECTOMY DISP

## (undated) DEVICE — SU DERMABOND ADVANCED .7ML DNX12

## (undated) DEVICE — GOWN IMPERVIOUS BREATHABLE SMART LG 89015

## (undated) DEVICE — GLOVE BIOGEL PI ULTRATOUCH G SZ 6.5 42165

## (undated) DEVICE — SOL NACL 0.9% IRRIG 1000ML BOTTLE 2F7124

## (undated) DEVICE — CUSHION INSERT LG PRONE VIEW JACKSON TABLE

## (undated) DEVICE — TOOL DISSECT MIDAS MR8 14CM MATCH HEAD 3MM MR8-14MH30

## (undated) DEVICE — PREP DYNA-HEX 4% CHG SCRUB 4OZ BOTTLE MDS098710

## (undated) RX ORDER — PROPOFOL 10 MG/ML
INJECTION, EMULSION INTRAVENOUS
Status: DISPENSED
Start: 2024-05-20

## (undated) RX ORDER — FENTANYL CITRATE 50 UG/ML
INJECTION, SOLUTION INTRAMUSCULAR; INTRAVENOUS
Status: DISPENSED
Start: 2024-05-20

## (undated) RX ORDER — CEFAZOLIN SODIUM 1 G/3ML
INJECTION, POWDER, FOR SOLUTION INTRAMUSCULAR; INTRAVENOUS
Status: DISPENSED
Start: 2024-05-20

## (undated) RX ORDER — LIDOCAINE HYDROCHLORIDE 10 MG/ML
INJECTION, SOLUTION EPIDURAL; INFILTRATION; INTRACAUDAL; PERINEURAL
Status: DISPENSED
Start: 2024-05-20

## (undated) RX ORDER — BUPIVACAINE HYDROCHLORIDE AND EPINEPHRINE 2.5; 5 MG/ML; UG/ML
INJECTION, SOLUTION EPIDURAL; INFILTRATION; INTRACAUDAL; PERINEURAL
Status: DISPENSED
Start: 2024-05-20